# Patient Record
Sex: FEMALE | Race: WHITE | Employment: OTHER | URBAN - METROPOLITAN AREA
[De-identification: names, ages, dates, MRNs, and addresses within clinical notes are randomized per-mention and may not be internally consistent; named-entity substitution may affect disease eponyms.]

---

## 2019-09-17 LAB
ESTRADIOL LEVEL: 10 PG/ML
FOLLICLE STIMULATING HORMONE: 58.9 MIU/ML
TSH SERPL DL<=0.05 MIU/L-ACNC: 2.95 UIU/ML (ref 0.27–4.2)

## 2020-08-26 ENCOUNTER — HOSPITAL ENCOUNTER (OUTPATIENT)
Dept: ULTRASOUND IMAGING | Age: 55
Discharge: HOME | End: 2020-08-26
Payer: COMMERCIAL

## 2020-08-26 ENCOUNTER — HOSPITAL ENCOUNTER (OUTPATIENT)
Dept: MAMMOGRAPHY | Age: 55
Discharge: HOME | End: 2020-08-26
Payer: COMMERCIAL

## 2020-08-26 PROCEDURE — G0279 TOMOSYNTHESIS, MAMMO: HCPCS

## 2020-08-26 PROCEDURE — 76642 ULTRASOUND BREAST LIMITED: CPT

## 2021-02-25 ENCOUNTER — HOSPITAL ENCOUNTER (OUTPATIENT)
Dept: WOMENS IMAGING | Age: 56
Discharge: HOME | End: 2021-02-25
Payer: COMMERCIAL

## 2021-02-25 ENCOUNTER — APPOINTMENT (OUTPATIENT)
Dept: ULTRASOUND IMAGING | Age: 56
End: 2021-02-25
Payer: COMMERCIAL

## 2021-02-25 DIAGNOSIS — R92.8 FOLLOW-UP EXAMINATION OF ABNORMAL MAMMOGRAM: ICD-10-CM

## 2021-02-25 PROCEDURE — G0279 TOMOSYNTHESIS, MAMMO: HCPCS

## 2021-08-26 ENCOUNTER — HOSPITAL ENCOUNTER (OUTPATIENT)
Dept: WOMENS IMAGING | Age: 56
Discharge: HOME | End: 2021-08-26
Payer: COMMERCIAL

## 2021-08-26 VITALS — HEIGHT: 65 IN | WEIGHT: 250 LBS | BODY MASS INDEX: 41.65 KG/M2

## 2021-08-26 DIAGNOSIS — R92.8 ABNORMAL MAMMOGRAM: ICD-10-CM

## 2021-08-26 PROCEDURE — G0279 TOMOSYNTHESIS, MAMMO: HCPCS

## 2022-09-30 ENCOUNTER — HOSPITAL ENCOUNTER (OUTPATIENT)
Dept: WOMENS IMAGING | Age: 57
Discharge: HOME | End: 2022-09-30
Payer: COMMERCIAL

## 2022-09-30 VITALS — WEIGHT: 250 LBS | HEIGHT: 65 IN | BODY MASS INDEX: 41.65 KG/M2

## 2022-09-30 DIAGNOSIS — Z12.31 VISIT FOR SCREENING MAMMOGRAM: ICD-10-CM

## 2022-09-30 PROCEDURE — 77067 SCR MAMMO BI INCL CAD: CPT

## 2022-10-24 SDOH — HEALTH STABILITY: PHYSICAL HEALTH: ON AVERAGE, HOW MANY DAYS PER WEEK DO YOU ENGAGE IN MODERATE TO STRENUOUS EXERCISE (LIKE A BRISK WALK)?: 0 DAYS

## 2022-10-24 ASSESSMENT — SOCIAL DETERMINANTS OF HEALTH (SDOH)
WITHIN THE LAST YEAR, HAVE YOU BEEN KICKED, HIT, SLAPPED, OR OTHERWISE PHYSICALLY HURT BY YOUR PARTNER OR EX-PARTNER?: NO
WITHIN THE LAST YEAR, HAVE YOU BEEN AFRAID OF YOUR PARTNER OR EX-PARTNER?: NO
WITHIN THE LAST YEAR, HAVE TO BEEN RAPED OR FORCED TO HAVE ANY KIND OF SEXUAL ACTIVITY BY YOUR PARTNER OR EX-PARTNER?: NO
WITHIN THE LAST YEAR, HAVE YOU BEEN HUMILIATED OR EMOTIONALLY ABUSED IN OTHER WAYS BY YOUR PARTNER OR EX-PARTNER?: NO

## 2022-10-25 ENCOUNTER — TELEPHONE (OUTPATIENT)
Dept: ORTHOPEDIC SURGERY | Age: 57
End: 2022-10-25

## 2022-10-25 NOTE — TELEPHONE ENCOUNTER
SPOKE WITH PT. SHE WILL SE DR. SILVA FOR RT SHOULDER AND SEE DR. MC FOR HER BACK. PATIENT EXPRESSED UNDERSTANDING.

## 2022-10-27 ENCOUNTER — OFFICE VISIT (OUTPATIENT)
Dept: ORTHOPEDIC SURGERY | Age: 57
End: 2022-10-27
Payer: COMMERCIAL

## 2022-10-27 ENCOUNTER — TELEPHONE (OUTPATIENT)
Dept: ORTHOPEDIC SURGERY | Age: 57
End: 2022-10-27

## 2022-10-27 VITALS — BODY MASS INDEX: 41.65 KG/M2 | HEIGHT: 65 IN | WEIGHT: 250 LBS

## 2022-10-27 DIAGNOSIS — M25.511 RIGHT SHOULDER PAIN, UNSPECIFIED CHRONICITY: Primary | ICD-10-CM

## 2022-10-27 PROCEDURE — 3017F COLORECTAL CA SCREEN DOC REV: CPT | Performed by: ORTHOPAEDIC SURGERY

## 2022-10-27 PROCEDURE — 1036F TOBACCO NON-USER: CPT | Performed by: ORTHOPAEDIC SURGERY

## 2022-10-27 PROCEDURE — G8417 CALC BMI ABV UP PARAM F/U: HCPCS | Performed by: ORTHOPAEDIC SURGERY

## 2022-10-27 PROCEDURE — G8484 FLU IMMUNIZE NO ADMIN: HCPCS | Performed by: ORTHOPAEDIC SURGERY

## 2022-10-27 PROCEDURE — G8427 DOCREV CUR MEDS BY ELIG CLIN: HCPCS | Performed by: ORTHOPAEDIC SURGERY

## 2022-10-27 PROCEDURE — 99203 OFFICE O/P NEW LOW 30 MIN: CPT | Performed by: ORTHOPAEDIC SURGERY

## 2022-10-27 RX ORDER — SENNA PLUS 8.6 MG/1
TABLET ORAL PRN
COMMUNITY
Start: 2022-07-18

## 2022-10-27 RX ORDER — PRAVASTATIN SODIUM 40 MG
TABLET ORAL
COMMUNITY
Start: 2022-07-18

## 2022-10-27 RX ORDER — MELOXICAM 15 MG/1
TABLET ORAL
COMMUNITY
Start: 2020-10-15 | End: 2022-11-10

## 2022-10-27 RX ORDER — DIAZEPAM 5 MG/1
TABLET ORAL EVERY 12 HOURS PRN
COMMUNITY
Start: 2022-10-11

## 2022-10-27 RX ORDER — GABAPENTIN 300 MG/1
CAPSULE ORAL
COMMUNITY
Start: 2022-10-11

## 2022-10-27 NOTE — TELEPHONE ENCOUNTER
General Question     Subject: PATIENT STATES HER PHARMACY HAS NOT RECEIVED HER PRESCRIPTION. PLEASE ADVISE.    Patient Tammy Beckford  Contact Number: [phone]

## 2022-10-27 NOTE — TELEPHONE ENCOUNTER
Meds are pended in patient visit from this AM.  Will be sent once signed. Should be signed by end of day.

## 2022-10-27 NOTE — PROGRESS NOTES
10/27/2022     Reason for visit:  Right shoulder pain following injury    History of Present Illness:  Is a 53-year-old female who presents for evaluation. She reports right shoulder pain. She thinks she may have lifted a heavy object resulting in the pain. She is also fallen a few times over the past month or 2. She localized the pain diffuse about the shoulder including anterior, deep, and upper lateral arm. She has difficulty with overhead activity, reaching, and lifting. She is attempted home physical therapy and has been taken anti-inflammatories but remains symptomatic.     Medical History:  Past Medical History:   Diagnosis Date    Asthma     Depression     H/O drug overdose 2002    suicide attempt, cardiac arrest    Headache(784.0)     Hyperlipemia     Hypertension     Meniere's disease     Mitral valve prolapse     PONV (postoperative nausea and vomiting)     Sleep apnea     USES C-PAP    Wears hearing aid       Past Surgical History:   Procedure Laterality Date    BREAST BIOPSY      CERVIX REMOVAL  12/08/2016    CHOLECYSTECTOMY  2008    ENDOMETRIAL ABLATION  1999    FOOT SURGERY  2002    HYSTERECTOMY (CERVIX STATUS UNKNOWN)  2009    laparoscopic supracervical hyst for fibroids    LAP BAND  2008     removal 2011    TONSILLECTOMY  1971    TUBAL LIGATION  2003      Family History   Problem Relation Age of Onset    Ovarian Cancer Mother     Migraines Mother     Stroke Mother     Depression Mother     High Cholesterol Mother     Hypertension Father     Cancer Father     Migraines Other     Hypertension Other     Stroke Other     Alcohol Abuse Other     Hypertension Other     High Cholesterol Other     Stroke Other     Diabetes Other     Hypertension Other     High Cholesterol Other     Alcohol Abuse Other     Drug Abuse Other     Breast Cancer Other     Dementia Other     Diabetes Other     Hypertension Other     High Cholesterol Other     Stroke Other     Depression Other     Cancer Other       Social History     Socioeconomic History    Marital status:      Spouse name: Not on file    Number of children: Not on file    Years of education: Not on file    Highest education level: Not on file   Occupational History    Not on file   Tobacco Use    Smoking status: Former     Types: Cigarettes     Quit date: 2004     Years since quittin.8    Smokeless tobacco: Never   Substance and Sexual Activity    Alcohol use: No     Comment: RARE    Drug use: No    Sexual activity: Not on file   Other Topics Concern    Not on file   Social History Narrative    Not on file     Social Determinants of Health     Financial Resource Strain: Not on file   Food Insecurity: Not on file   Transportation Needs: Not on file   Physical Activity: Unknown    Days of Exercise per Week: 0 days    Minutes of Exercise per Session: Not on file   Stress: Not on file   Social Connections: Not on file   Intimate Partner Violence: Not At Risk    Fear of Current or Ex-Partner: No    Emotionally Abused: No    Physically Abused: No    Sexually Abused: No   Housing Stability: Not on file      Current Outpatient Medications on File Prior to Visit   Medication Sig Dispense Refill    pravastatin (PRAVACHOL) 40 MG tablet TAKE 1 TABLET AT BEDTIME      senna (SENOKOT) 8.6 MG tablet TAKE 2 TABLETS AT BEDTIME      meloxicam (MOBIC) 15 MG tablet 1 po qd prn      gabapentin (NEURONTIN) 300 MG capsule TAKE 1 CAPSULE BY MOUTH DAILY FOR 3 DAYS, THEN 1 CAPSULE 2 TIMES A DAY FOR 3 DAYS, THEN 1 CAPSULE 3 TIMES A DAY      diazePAM (VALIUM) 5 MG tablet       lisinopril (PRINIVIL;ZESTRIL) 20 MG tablet Take 20 mg by mouth daily      triamterene-hydrochlorothiazide (MAXZIDE-25) 37.5-25 MG per tablet Take 1 tablet by mouth daily      Omega-3 Fatty Acids (FISH OIL) 1200 MG CAPS Take by mouth 2 times daily      sertraline (ZOLOFT) 100 MG tablet Take 100 mg by mouth nightly      meclizine (ANTIVERT) 25 MG tablet Take 25 mg by mouth 2 times daily      promethazine (PHENERGAN) 25 MG tablet Take 25 mg by mouth every 6 hours as needed for Nausea      therapeutic multivitamin-minerals (THERAGRAN-M) tablet Take 1 tablet by mouth daily. omeprazole (PRILOSEC) 20 MG capsule Take 20 mg by mouth 2 times daily        No current facility-administered medications on file prior to visit. Allergies   Allergen Reactions    Iv Dye [Iodides]     Zomig [Zolmitriptan] Palpitations        Review of Systems:  Constitutional: Patient is adequately groomed with no evidence of malnutrition  Mental Status: The patient is oriented to time, place and person. The patient's mood and affect are appropriate. Lymphatic: The lymphatic examination bilaterally reveals all areas to be without enlargement or induration. Vascular: Examination reveals no swelling or calf tenderness. Peripheral pulses are palpable and 2+. Neurological: The patient has good coordination. There is no weakness or sensory deficit. Skin:  Head/Neck: inspection reveals no rashes, ulcerations or lesions. Trunk: inspection reveals no rashes, ulcerations or lesions.     Objective:  Ht 5' 5\" (1.651 m)   Wt 250 lb (113.4 kg)   BMI 41.60 kg/m²      Physical Exam:  The patient is well-appearing and in no apparent distress  Neg Spurling's test  Examination of the right shoulder  There is no swelling, ecchymosis, or gross deformity  There is no evidence of muscle atrophy  + Oates test, + Neers test  neg bicipital groove tenderness, neg AC joint tenderness  Range of motion reveals 120 degrees of forward flexion, 120 degrees of abduction, 40 degrees of external rotation, internal rotation to lumbar spine  4/5 strength with resisted abduction, 4/5 strength with resisted external rotation, 4+/5 strength with resisted internal rotation  Intact motor and sensory function throughout the median/radial/ulnar/PIN/AIN distributions  Palpable radial pulse, brisk cap refill, 2+ symmetric reflexes     Imagin view x-rays of the right shoulder obtained the office today on 10/27/2022 were reviewed. There is no fracture or dislocation. Degenerative changes of acromioclavicular joint. Assessment:  Right shoulder pain following injury. Concern for rotator cuff tendon tear    Plan:  Discussed with patient the differential diagnosis. Given history of injury combined with her current exam and lack of improvement with conservative measures I would recommend an MRI. She is in agreement. Following that study she will return to review the results in the office. We will also give a prescription for diclofenac. Greater than 30 minutes were spent with this encounter. Time spent included evaluating the patient's chart prior to arrival.  Evaluating the patient in the office including history, physical examination, imaging reviewing, and counseling on next steps. Lastly, time was spent discussing orders with my staff as well as providing documentation in the chart. Varun Everett MD            Orthopaedic Surgery Sports Medicine and [address] and [address] [facility]            Team Physician [facility] ([place]      Disclaimer: This note was dictated with voice recognition software. Though review and correction are routine, we apologize for any errors.

## 2022-10-29 SDOH — HEALTH STABILITY: PHYSICAL HEALTH: ON AVERAGE, HOW MANY DAYS PER WEEK DO YOU ENGAGE IN MODERATE TO STRENUOUS EXERCISE (LIKE A BRISK WALK)?: 0 DAYS

## 2022-11-01 ENCOUNTER — OFFICE VISIT (OUTPATIENT)
Dept: ORTHOPEDIC SURGERY | Age: 57
End: 2022-11-01
Payer: COMMERCIAL

## 2022-11-01 ENCOUNTER — HOSPITAL ENCOUNTER (OUTPATIENT)
Dept: MRI IMAGING | Age: 57
Discharge: HOME | End: 2022-11-01
Payer: COMMERCIAL

## 2022-11-01 ENCOUNTER — TELEPHONE (OUTPATIENT)
Dept: ORTHOPEDIC SURGERY | Age: 57
End: 2022-11-01

## 2022-11-01 VITALS — BODY MASS INDEX: 41.65 KG/M2 | WEIGHT: 250 LBS | HEIGHT: 65 IN

## 2022-11-01 DIAGNOSIS — M48.062 SPINAL STENOSIS OF LUMBAR REGION WITH NEUROGENIC CLAUDICATION: ICD-10-CM

## 2022-11-01 DIAGNOSIS — M25.511 RIGHT SHOULDER PAIN, UNSPECIFIED CHRONICITY: ICD-10-CM

## 2022-11-01 DIAGNOSIS — M48.061 DEGENERATIVE LUMBAR SPINAL STENOSIS: ICD-10-CM

## 2022-11-01 DIAGNOSIS — M51.36 DDD (DEGENERATIVE DISC DISEASE), LUMBAR: ICD-10-CM

## 2022-11-01 DIAGNOSIS — M47.816 LUMBAR SPONDYLOSIS: ICD-10-CM

## 2022-11-01 DIAGNOSIS — E66.01 MORBID OBESITY DUE TO EXCESS CALORIES (HCC): ICD-10-CM

## 2022-11-01 PROCEDURE — G8484 FLU IMMUNIZE NO ADMIN: HCPCS | Performed by: INTERNAL MEDICINE

## 2022-11-01 PROCEDURE — 3017F COLORECTAL CA SCREEN DOC REV: CPT | Performed by: INTERNAL MEDICINE

## 2022-11-01 PROCEDURE — 1036F TOBACCO NON-USER: CPT | Performed by: INTERNAL MEDICINE

## 2022-11-01 PROCEDURE — 73221 MRI JOINT UPR EXTREM W/O DYE: CPT

## 2022-11-01 PROCEDURE — 99204 OFFICE O/P NEW MOD 45 MIN: CPT | Performed by: INTERNAL MEDICINE

## 2022-11-01 PROCEDURE — G8427 DOCREV CUR MEDS BY ELIG CLIN: HCPCS | Performed by: INTERNAL MEDICINE

## 2022-11-01 PROCEDURE — G8417 CALC BMI ABV UP PARAM F/U: HCPCS | Performed by: INTERNAL MEDICINE

## 2022-11-01 RX ORDER — TIZANIDINE 4 MG/1
4 TABLET ORAL 3 TIMES DAILY PRN
Qty: 30 TABLET | Refills: 1 | Status: SHIPPED | OUTPATIENT
Start: 2022-11-01

## 2022-11-01 RX ORDER — TRAMADOL HYDROCHLORIDE 50 MG/1
50 TABLET ORAL EVERY 8 HOURS PRN
Qty: 20 TABLET | Refills: 0 | Status: SHIPPED | OUTPATIENT
Start: 2022-11-01 | End: 2022-11-08

## 2022-11-01 NOTE — PROGRESS NOTES
Chief Complaint:   Chief Complaint   Patient presents with    Lower Back Pain     Lumbar, pain has been going on for 30 years. Increased pain with activity or prolonged sitting. History of Present Illness:       Patient is a 61 y.o. female presents with the above complaint. The symptoms began 1 plus yearsago and started without an injury. The patient describes a sharp, aching pain that does not radiate. The symptoms are constant  and are are worsening since the onset. The symptoms of back pain  do show a neurogenic claudication pattern and is worsened by standing, walking, and increased activity levels  and improved with sitting and shifting position . There is not new onset weakness or progressive weakness of the lower extremities that has developed. The patient denies new onset bowel or bladder dysfunction. There is no history of previous spinal trauma. The patient does not have history or orthopaedic lumbar spine surgery. Pain localizes to the lumbar region    Pain levels: 8     There is no  lower limb pain. Work-up to date has included: Meloxicam and diclofenac and chiropractic care  Prior treatment has included. This patient reports little improvement with this treatment. The patient has no history or autoimmune disease, inflammatory arthropathy or crystal arthropathy.     Past Medical History:        Past Medical History:   Diagnosis Date    Asthma     Depression     H/O drug overdose 2002    suicide attempt, cardiac arrest    Headache(784.0)     Hyperlipemia     Hypertension     Meniere's disease     Mitral valve prolapse     PONV (postoperative nausea and vomiting)     Sleep apnea     USES C-PAP    Wears hearing aid          Past Surgical History:   Procedure Laterality Date    BREAST BIOPSY      CERVIX REMOVAL  12/08/2016    CHOLECYSTECTOMY  2008    ENDOMETRIAL ABLATION  1999    FOOT SURGERY  2002    HYSTERECTOMY (CERVIX STATUS UNKNOWN)  2009    laparoscopic supracervical hyst for fibroids    LAP BAND  2008     removal 2011    [facility]  2003         Present Medications:         Current Outpatient Medications   Medication Sig Dispense Refill    tiZANidine (ZANAFLEX) 4 MG tablet Take 1 tablet by mouth 3 times daily as needed (Muscle tightness/spasm) 30 tablet 1    traMADol (ULTRAM) 50 MG tablet Take 1 tablet by mouth every 8 hours as needed for Pain for up to 7 days. 20 tablet 0    pravastatin (PRAVACHOL) 40 MG tablet TAKE 1 TABLET AT BEDTIME      senna (SENOKOT) 8.6 MG tablet TAKE 2 TABLETS AT BEDTIME      gabapentin (NEURONTIN) 300 MG capsule TAKE 1 CAPSULE BY MOUTH DAILY FOR 3 DAYS, THEN 1 CAPSULE 2 TIMES A DAY FOR 3 DAYS, THEN 1 CAPSULE 3 TIMES A DAY      meloxicam (MOBIC) 15 MG tablet 1 po qd prn      diazePAM (VALIUM) 5 MG tablet       diclofenac (VOLTAREN) 50 MG EC tablet Take 1 tablet by mouth 2 times daily (with meals) 60 tablet 3    lisinopril (PRINIVIL;ZESTRIL) 20 MG tablet Take 20 mg by mouth daily      triamterene-hydrochlorothiazide (MAXZIDE-25) 37.5-25 MG per tablet Take 1 tablet by mouth daily      Omega-3 Fatty Acids (FISH OIL) 1200 MG CAPS Take by mouth 2 times daily      sertraline (ZOLOFT) 100 MG tablet Take 100 mg by mouth nightly      meclizine (ANTIVERT) 25 MG tablet Take 25 mg by mouth 2 times daily      promethazine (PHENERGAN) 25 MG tablet Take 25 mg by mouth every 6 hours as needed for Nausea      therapeutic multivitamin-minerals (THERAGRAN-M) tablet Take 1 tablet by mouth daily. omeprazole (PRILOSEC) 20 MG capsule Take 20 mg by mouth 2 times daily        No current facility-administered medications for this visit. Allergies:         Allergies   Allergen Reactions    Iv Dye [Iodides]     Zomig [Zolmitriptan] Palpitations        Social History:         Social History     Socioeconomic History    Marital status:      Spouse name: Not on file    Number of children: Not on file    Years of education: Not on file Highest education level: Not on file   Occupational History    Not on file   Tobacco Use    Smoking status: Former     Types: Cigarettes     Quit date: 2004     Years since quittin.8    Smokeless tobacco: Never   Substance and Sexual Activity    Alcohol use: No     Comment: RARE    Drug use: No    Sexual activity: Not on file   Other Topics Concern    Not on file   Social History Narrative    Not on file     Social Determinants of Health     Financial Resource Strain: Not on file   Food Insecurity: Not on file   Transportation Needs: Not on file   Physical Activity: Unknown    Days of Exercise per Week: 0 days    Minutes of Exercise per Session: Not on file   Stress: Not on file   Social Connections: Not on file   Intimate Partner Violence: Not At Risk    Fear of Current or Ex-Partner: No    Emotionally Abused: No    Physically Abused: No    Sexually Abused: No   Housing Stability: Not on file        Review of Symptoms:    Pertinent items are noted in HPI    Review of systems reviewed from Patient History Form dated on today's date and   available in the patient's chart under the Media tab. Vital Signs: There were no vitals filed for this visit. General Exam:     Constitutional: Patient is adequately groomed with no evidence of malnutrition  Mental Status: The patient is oriented to time, place and person. The patient's mood and affect are appropriate. Vascular: Examination reveals no swelling or calf tenderness. Peripheral pulses are palpable and 2+.     Lymphatics: no lymphadenopathy of the inguinal region or lower extremity      Physical Exam: lower back   General: Obese body habitus no acute distress   Primary Exam:    Inspection: No deformity atrophy appreciable curvature      Palpation: No focal trigger point tenderness      Range of Motion: 80/10 pain with extension greater than flexion      Strength: Normal lower extremity      Special Tests: Negative SLR      Skin: There are no rashes, ulcerations or lesions. Gait: Nonantalgic      Reflex hypoactive at the knees     Additional Comments:        Additional Examinations:           Neurologic -Light touch sensation and manual muscle testing is normal C5-C8 . Biceps and triceps reflexes are symmetric and +2. Spurlling sign is negative            Office Imaging Results/Procedures PerformedToday:      Radiology:      X-rays obtained and reviewed in office:   Views 3 views lumbar spine   Location lumbar spine   Impression: Subtle leftward rotoscoliosis lumbar spine lateral projection demonstrates advanced intervertebral disc space narrowing at L5/S1 multilevel spondylosis facet arthropathy most pronounced at L4-L5 greater than L5/S1 vertebral body heights are well-maintained       Office Procedures:     Orders Placed This Encounter   Procedures    XR LUMBAR SPINE (2-3 VIEWS)     Standing Status:   Future     Number of Occurrences:   1     Standing Expiration Date:   12/1/2022     Order Specific Question:   Reason for exam:     Answer:   Pain    MRI LUMBAR SPINE WO CONTRAST     Standing Status:   Future     Standing Expiration Date:   11/1/2023     Scheduling Instructions:      Caden Diaz, please contact pt to schedule, [address] will obtain auth and fwd to your facility. Pt advised to f/u in clinic 2-3 days after MRI for results. Order Specific Question:   Reason for exam:     Answer:   R/O HNP / STENOSIS     Order Specific Question:   What is the sedation requirement? Answer:   None    [facility], Nutrition Services, [place]     Referral Priority:   Routine     Referral Type:   Consult for Advice and Opinion     Referral Reason:   Specialty Services Required     Requested Specialty:   Nutrition     Number of Visits Requested:   1           Other Outside Imaging and Testing Personally Reviewed:    XR LUMBAR SPINE (2-3 VIEWS)    Result Date: 11/1/2022  Radiology exam is complete.  No Radiologist dictation. Please follow up with ordering provider. XR SHOULDER RIGHT (MIN 2 VIEWS)    Result Date: 10/27/2022  Radiology exam is complete. No Radiologist dictation. Please follow up with ordering provider. Assessment   Impression: . Encounter Diagnoses   Name Primary? Degenerative lumbar spinal stenosis     Spinal stenosis of lumbar region with neurogenic claudication     DDD (degenerative disc disease), lumbar     Lumbar spondylosis     Morbid obesity due to excess calories ([address] Yes              Plan:        MRI lumbar spine evaluate severity of discopathy/ stenosis suspected clinically  Consider Her a candidate for spine intervention injection  Activity modification, lumbar spine precautions  lumbar spinestabilization home exercise program  Medical pain management: NSAID: Continue diclofenac, addition of Zanaflex as needed, Ultram.  Short-term minimize use of narcotics  Referral to weight management and behavioral modification to promote weight loss measures    Approximately 45 minutes was spent related to previewing pertinent medical documentation prior to the patient's visit along with counseling during the patient's visit with respect to treatment options inclusive of alternatives to treatment and the complications and risks related to those treatment options along with expectations of outcome related to those treatments and inclusive of time in the documentation and ordering of testing and treatment after the visit. The nature of the finding, probable diagnosis and likely treatment was thoroughly discussed with the patient. The options, risks, complications, alternative treatment as well as some of the differential diagnosis was discussed. The patient was thoroughly informed and all questions were answered. the patient indicated understanding and satisfaction with the discussion.       Orders:        Orders Placed This Encounter   Procedures    XR LUMBAR SPINE (2-3 VIEWS)     Standing Status:   Future     Number of Occurrences:   1     Standing Expiration Date:   12/1/2022     Order Specific Question:   Reason for exam:     Answer:   Pain    MRI LUMBAR SPINE WO CONTRAST     Standing Status:   Future     Standing Expiration Date:   11/1/2023     Scheduling Instructions:      Rafiq Morales, please contact pt to schedule, [address] will obtain auth and fwd to your facility. Pt advised to f/u in clinic 2-3 days after MRI for results. Order Specific Question:   Reason for exam:     Answer:   R/O HNP / STENOSIS     Order Specific Question:   What is the sedation requirement? Answer:   None    [facility], Nutrition Services, [place]     Referral Priority:   Routine     Referral Type:   Consult for Advice and Opinion     Referral Reason:   Specialty Services Required     Requested Specialty:   Nutrition     Number of Visits Requested:   1           Disclaimer: \"This note was dictated with voice recognition software. Though review and correction are routine, we apologize for any errors. \"

## 2022-11-01 NOTE — TELEPHONE ENCOUNTER
General Question     Subject: patient call and would like to know  if she can get and Handicap Placard  she would like to have the letter or note put in her My Chart if possible. Please Advise.   Patient Breann Perales  Contact Number: [phone]

## 2022-11-03 ENCOUNTER — TELEPHONE (OUTPATIENT)
Dept: ORTHOPEDIC SURGERY | Age: 57
End: 2022-11-03

## 2022-11-03 ENCOUNTER — OFFICE VISIT (OUTPATIENT)
Dept: ORTHOPEDIC SURGERY | Age: 57
End: 2022-11-03
Payer: COMMERCIAL

## 2022-11-03 VITALS — BODY MASS INDEX: 41.65 KG/M2 | HEIGHT: 65 IN | WEIGHT: 250 LBS

## 2022-11-03 DIAGNOSIS — M75.121 COMPLETE TEAR OF RIGHT ROTATOR CUFF, UNSPECIFIED WHETHER TRAUMATIC: Primary | ICD-10-CM

## 2022-11-03 DIAGNOSIS — M75.41 IMPINGEMENT SYNDROME OF RIGHT SHOULDER: ICD-10-CM

## 2022-11-03 PROCEDURE — 1036F TOBACCO NON-USER: CPT | Performed by: ORTHOPAEDIC SURGERY

## 2022-11-03 PROCEDURE — G8417 CALC BMI ABV UP PARAM F/U: HCPCS | Performed by: ORTHOPAEDIC SURGERY

## 2022-11-03 PROCEDURE — G8427 DOCREV CUR MEDS BY ELIG CLIN: HCPCS | Performed by: ORTHOPAEDIC SURGERY

## 2022-11-03 PROCEDURE — G8484 FLU IMMUNIZE NO ADMIN: HCPCS | Performed by: ORTHOPAEDIC SURGERY

## 2022-11-03 PROCEDURE — 3017F COLORECTAL CA SCREEN DOC REV: CPT | Performed by: ORTHOPAEDIC SURGERY

## 2022-11-03 PROCEDURE — 99215 OFFICE O/P EST HI 40 MIN: CPT | Performed by: ORTHOPAEDIC SURGERY

## 2022-11-03 NOTE — LETTER
[facility]  Surgery Precert & Billing Form:    DEMOGRAPHICS:                                                                                                       Patient Name:  Nancy Reynoso  Patient :  1965   Patient SS#:      Patient Phone:  [phone] (home)  Alt.  Patient Phone:    Patient Address:  [facility] [address] [address] [address] [facility],[address] [id number]    PCP:  Kassi Gordon MD  Insurance: [employer]     DIAGNOSIS & PROCEDURE:                                                                                      Diagnosis: M75.101 Right shoulder rotator cuff tendon tear M75.41 Right shoulder impingement   Operation: right    SURGERY  INFORMATION  Date of Surgery:   22  Location:    [facility]  Type:    Outpatient  23 hour hold:  No  Surgeon:          Dr. Shannan Zaman 11/3/22     BILLING INFORMATION:                                                                                                Physician Procedure                                            CPT Codes        50161 [id number]   Right shoulder arthroscopy; possible subacromial decompression, rotator cuff tendon repair                 PA, or Fellow Procedure                                      CPT Codes                           Precert information:

## 2022-11-07 NOTE — PROGRESS NOTES
11/3/2022     Reason for visit:  Right shoulder pain following injury    History of Present Illness:  Is a 76-year-old female who presents for evaluation. She reports right shoulder pain. She thinks she may have lifted a heavy object resulting in the pain. She is also fallen a few times over the past month or 2. She localized the pain diffuse about the shoulder including anterior, deep, and upper lateral arm. She has difficulty with overhead activity, reaching, and lifting. She is attempted home physical therapy and has been taken anti-inflammatories but remains symptomatic. Last visit we elected proceed with an MRI. She is here to review the results and discuss treatment options. Objective:  Ht 5' 5\" (1.651 m)   Wt 250 lb (113.4 kg)   BMI 41.60 kg/m²      Physical Exam:  The patient is well-appearing and in no apparent distress  Neg Spurling's test  Examination of the right shoulder  There is no swelling, ecchymosis, or gross deformity  There is no evidence of muscle atrophy  + Oates test, + Neers test  neg bicipital groove tenderness, neg AC joint tenderness  Range of motion reveals 120 degrees of forward flexion, 120 degrees of abduction, 40 degrees of external rotation, internal rotation to lumbar spine  4/5 strength with resisted abduction, 4/5 strength with resisted external rotation, 4+/5 strength with resisted internal rotation  Intact motor and sensory function throughout the median/radial/ulnar/PIN/AIN distributions  Palpable radial pulse, brisk cap refill, 2+ symmetric reflexes     Imagin view x-rays of the right shoulder obtained the office today on 10/27/2022 were reviewed. There is no fracture or dislocation. Degenerative changes of acromioclavicular joint. The right shoulder was reviewed. There is no full-thickness tear of the supraspinatus present. Degenerative changes acromioclavicular joint. Cyst is noted within the subcutaneous tissue of the lateral upper arm.   This appears benign and likely a sebaceous cyst versus other benign process. Assessment:  Right shoulder pain following injury. MRI reveals rotator cuff tear. Patient also has a symptomatic benign-appearing cyst on the upper lateral arm    Plan:  Long discussion with the patient. We spent time reviewing MRI findings. Given the traumatic nature of her injury combined with the full-thickness involvement of the tear I would recommend surgical intervention in the form of right shoulder arthroscopy with rotator cuff tendon repair. The risk were defined as but not limited to infection, bleeding, damage of blood vessels or nerves, need for additional surgery. She does understand elects proceed. She also asked about the cyst on the side of her arm which does bother her. Therefore we would perform excision of the cyst at the time of surgery. Greater than 45 minutes were spent with this encounter. Time spent included evaluating the patient's chart prior to arrival.  Evaluating the patient in the office including history, physical examination, imaging reviewing, and counseling on next steps. Lastly, time was spent discussing orders with my staff as well as providing documentation in the chart. Shannan Zaman MD            Orthopaedic Surgery Sports Medicine and [address] and [address] [facility] Physician [facility] ([place])      Disclaimer: This note was dictated with voice recognition software. Though review and correction are routine, we apologize for any errors.

## 2022-11-10 ENCOUNTER — OFFICE VISIT (OUTPATIENT)
Dept: ORTHOPEDIC SURGERY | Age: 57
End: 2022-11-10
Payer: COMMERCIAL

## 2022-11-10 VITALS — HEIGHT: 65 IN | WEIGHT: 250 LBS | BODY MASS INDEX: 41.65 KG/M2

## 2022-11-10 DIAGNOSIS — M51.36 DDD (DEGENERATIVE DISC DISEASE), LUMBAR: ICD-10-CM

## 2022-11-10 DIAGNOSIS — M47.816 LUMBAR FACET ARTHROPATHY: ICD-10-CM

## 2022-11-10 DIAGNOSIS — M47.816 LUMBAR SPONDYLOSIS: ICD-10-CM

## 2022-11-10 PROCEDURE — 3017F COLORECTAL CA SCREEN DOC REV: CPT | Performed by: INTERNAL MEDICINE

## 2022-11-10 PROCEDURE — 99214 OFFICE O/P EST MOD 30 MIN: CPT | Performed by: INTERNAL MEDICINE

## 2022-11-10 PROCEDURE — G8417 CALC BMI ABV UP PARAM F/U: HCPCS | Performed by: INTERNAL MEDICINE

## 2022-11-10 PROCEDURE — 1036F TOBACCO NON-USER: CPT | Performed by: INTERNAL MEDICINE

## 2022-11-10 PROCEDURE — G8427 DOCREV CUR MEDS BY ELIG CLIN: HCPCS | Performed by: INTERNAL MEDICINE

## 2022-11-10 PROCEDURE — G8484 FLU IMMUNIZE NO ADMIN: HCPCS | Performed by: INTERNAL MEDICINE

## 2022-11-10 RX ORDER — TRAMADOL HYDROCHLORIDE 50 MG/1
50 TABLET ORAL EVERY 8 HOURS PRN
COMMUNITY

## 2022-11-10 RX ORDER — CELECOXIB 200 MG/1
200 CAPSULE ORAL DAILY PRN
Qty: 30 CAPSULE | Refills: 1 | Status: SHIPPED | OUTPATIENT
Start: 2022-11-10

## 2022-11-10 NOTE — PROGRESS NOTES
Chief Complaint:   Chief Complaint   Patient presents with    Lower Back Pain     F/U Lumbar MRI TR, feels the same no change. Any rotation or trunk flexion causes pain. Increased pain with standing and when sitting has to change position to find comfort. History of Present Illness:       Patient is a 61 y.o. female returns follow up for the above complaint. The patient was last seen approximately 9 daysago. The symptoms show no change since the last visit. The patient has had further testing for the problem. MRI completed in the interim. Back:Leftleg pain 80:20 . Pain back and leg is aching or stabbing in quality. The symptoms do not follow a discogenic provocative pattern. Pain levels: 4-8 . The patient denies new onset or progressive weakness of the lower extremities. The patient denies now onset bowel or bladder function.     She continues on medical pain management as per previous  inclusive of NSAID-diclofenac, muscle relaxant-Zanaflex  She reports no therapeutic benefit from diclofenac and has had a previous trial of meloxicam without benefit                Past Medical History:        Past Medical History:   Diagnosis Date    Asthma     Depression     H/O drug overdose 2002    suicide attempt, cardiac arrest    Headache(784.0)     Hyperlipemia     Hypertension     Meniere's disease     Mitral valve prolapse     PONV (postoperative nausea and vomiting)     Sleep apnea     USES C-PAP    Wears hearing aid         Present Medications:         Current Outpatient Medications   Medication Sig Dispense Refill    tiZANidine (ZANAFLEX) 4 MG tablet Take 1 tablet by mouth 3 times daily as needed (Muscle tightness/spasm) 30 tablet 1    pravastatin (PRAVACHOL) 40 MG tablet TAKE 1 TABLET AT BEDTIME      senna (SENOKOT) 8.6 MG tablet TAKE 2 TABLETS AT BEDTIME      gabapentin (NEURONTIN) 300 MG capsule TAKE 1 CAPSULE BY MOUTH DAILY FOR 3 DAYS, THEN 1 CAPSULE 2 TIMES A DAY FOR 3 DAYS, THEN 1 CAPSULE 3 TIMES A DAY      meloxicam (MOBIC) 15 MG tablet 1 po qd prn      diazePAM (VALIUM) 5 MG tablet       diclofenac (VOLTAREN) 50 MG EC tablet Take 1 tablet by mouth 2 times daily (with meals) 60 tablet 3    lisinopril (PRINIVIL;ZESTRIL) 20 MG tablet Take 20 mg by mouth daily      triamterene-hydrochlorothiazide (MAXZIDE-25) 37.5-25 MG per tablet Take 1 tablet by mouth daily      Omega-3 Fatty Acids (FISH OIL) 1200 MG CAPS Take by mouth 2 times daily      sertraline (ZOLOFT) 100 MG tablet Take 100 mg by mouth nightly      meclizine (ANTIVERT) 25 MG tablet Take 25 mg by mouth 2 times daily      promethazine (PHENERGAN) 25 MG tablet Take 25 mg by mouth every 6 hours as needed for Nausea      therapeutic multivitamin-minerals (THERAGRAN-M) tablet Take 1 tablet by mouth daily. omeprazole (PRILOSEC) 20 MG capsule Take 20 mg by mouth 2 times daily        No current facility-administered medications for this visit. Allergies: Allergies   Allergen Reactions    Iv Dye [Iodides]     Zomig [Zolmitriptan] Palpitations           Review of Systems:    Pertinent items are noted in HPI        Vital Signs: There were no vitals filed for this visit. General Exam:     Constitutional: Patient is adequately groomed with no evidence of malnutrition    Physical Exam:  Lower  back   General: Obese body habitus   Primary Exam:    Inspection: No deformity atrophy appreciable curvature      Palpation: No focal tenderness      Range of Motion: 70/5 pain with extension greater than flexion      Strength: Normal lower extremity      Special Tests: Negative SLR      Skin: There are no rashes, ulcerations or lesions.       Gait: Nonantalgic insert neuro    Neurovascular - non focal and intact       Additional Comments:        Additional Examinations:                    Office Imaging Results/Procedures PerformedToday:        Office Procedures:     Orders Placed This Encounter   Procedures    External Referral To Physical Therapy     Referral Priority:   Routine     Referral Type:   Eval and Treat     Referral Reason:   Specialty Services Required     Requested Specialty:   Physical Therapist     Number of Visits Requested:   1           Other Outside Imaging and Testing Personally Reviewed:    MRI LUMBAR SPINE WO CONTRAST    Result Date: 2022  Site: [facility] #: [id number] #: [id number] Procedure: MR Lumbar Spine w/o Contrast ; Reason for Exam: Spinal stenosis, lumbar region without neurogenic claudication, Low back pain, unspecified This document is confidential medical information. Unauthorized disclosure or use of this information is prohibited by law. If you are not the intended recipient of this document, please advise us by calling immediately [phone]. [facility] [address] Patient Name: Lilli Noriega Case ID: [id number] Patient : 1965 Referring Physician: Kasandra Nam MD Exam Date: 2022 Exam Description: MR Lumbar Spine w/o Contrast HISTORY:  Spinal stenosis without neurogenic claudication. Chronic low back pain. TECHNICAL FACTORS:  Long- and short-axis fat- and water-weighted images were performed. COMPARISON:  None. FINDINGS:  Subcentimeter round, smooth cyst in the left kidney. L1-2: Normal. L2-3: Right facet capsulitis. L3-4: Normal. L4-5: Distended facet capsule sign on the right raises the possibility of chronic microinstability; therefore, standing lateral flexion and extension views to evaluate for occult dynamic microinstability is requested. Scattered areas of fatty metaplasia about the endplate of I7-J5 in the L5 vertebral body consistent with Modic 2 changes. Generalized spondylosis deformans. Facet arthropathy.  CONCLUSION: Degenerative disc disease at L5-S1; however, the dominant finding most likely is at L4-5, where  distended facet capsule sign raises the possibility of microinstability syndrome with recommendations given in the body of the report. Thank you for the opportunity to provide your interpretation. William Clark MD A: [place] 11/09/2022 8:09 AM T: JOSE 11/08/2022 12:58 PM              Assessment   Impression: . Encounter Diagnoses   Name Primary? Lumbar facet arthropathy     Lumbar spondylosis     DDD (degenerative disc disease), lumbar               Plan: Activity modification lumbar spine precautions  Continue maintenance lumbar stabilization home exercise program  Discontinue diclofenac for Celebrex daily as needed GI precautions and as needed use of Zanaflex  Consider her candidate for L MBB if symptoms show no appreciable change or worsen  PT will have to be modified with delayed as she is scheduled undergo rotator cuff surgery next week; hold oral steroid trial relative to upcoming elective surgery  Behavior modification to promote weight loss as much as possible and consider referral to weight management program    Approximately 30 minutes was spent related to previewing pertinent medical documentation prior to the patient's visit along with counseling during the patient's visit with respect to treatment options inclusive of alternatives to treatment and the complications and risks related to those treatment options along with expectations of outcome related to those treatments and inclusive of time in the documentation and ordering of testing and treatment after the visit. Orders:        Orders Placed This Encounter   Procedures    External Referral To Physical Therapy     Referral Priority:   Routine     Referral Type:   Eval and Treat     Referral Reason:   Specialty Services Required     Requested Specialty:   Physical Therapist     Number of Visits Requested:   1         Corrinne Slicker, MD.      Brant Murry: \"This note was dictated with voice recognition software. Though review and correction are routine, we apologize for any errors. \"

## 2022-11-16 ENCOUNTER — TELEPHONE (OUTPATIENT)
Dept: ORTHOPEDIC SURGERY | Age: 57
End: 2022-11-16

## 2022-11-16 DIAGNOSIS — G89.18 POST-OP PAIN: Primary | ICD-10-CM

## 2022-11-18 ENCOUNTER — ANESTHESIA (OUTPATIENT)
Dept: OPERATING ROOM | Age: 57
End: 2022-11-18
Payer: COMMERCIAL

## 2022-11-18 ENCOUNTER — HOSPITAL ENCOUNTER (OUTPATIENT)
Age: 57
Setting detail: OUTPATIENT SURGERY
Discharge: HOME | End: 2022-11-18
Attending: ORTHOPAEDIC SURGERY | Admitting: ORTHOPAEDIC SURGERY
Payer: COMMERCIAL

## 2022-11-18 ENCOUNTER — ANESTHESIA EVENT (OUTPATIENT)
Dept: OPERATING ROOM | Age: 57
End: 2022-11-18
Payer: COMMERCIAL

## 2022-11-18 VITALS
HEIGHT: 65 IN | TEMPERATURE: 97.5 F | WEIGHT: 259 LBS | DIASTOLIC BLOOD PRESSURE: 65 MMHG | OXYGEN SATURATION: 95 % | HEART RATE: 91 BPM | BODY MASS INDEX: 43.15 KG/M2 | SYSTOLIC BLOOD PRESSURE: 143 MMHG | RESPIRATION RATE: 22 BRPM

## 2022-11-18 DIAGNOSIS — M75.41 SHOULDER IMPINGEMENT, RIGHT: ICD-10-CM

## 2022-11-18 DIAGNOSIS — M75.101 TEAR OF RIGHT ROTATOR CUFF, UNSPECIFIED TEAR EXTENT, UNSPECIFIED WHETHER TRAUMATIC: ICD-10-CM

## 2022-11-18 PROCEDURE — 6360000002 HC RX W HCPCS: Performed by: ORTHOPAEDIC SURGERY

## 2022-11-18 PROCEDURE — 3600000014 HC SURGERY LEVEL 4 ADDTL 15MIN: Performed by: ORTHOPAEDIC SURGERY

## 2022-11-18 PROCEDURE — C1713 ANCHOR/SCREW BN/BN,TIS/BN: HCPCS | Performed by: ORTHOPAEDIC SURGERY

## 2022-11-18 PROCEDURE — 2580000003 HC RX 258: Performed by: ORTHOPAEDIC SURGERY

## 2022-11-18 PROCEDURE — 2720000010 HC SURG SUPPLY STERILE: Performed by: ORTHOPAEDIC SURGERY

## 2022-11-18 PROCEDURE — 7100000000 HC PACU RECOVERY - FIRST 15 MIN: Performed by: ORTHOPAEDIC SURGERY

## 2022-11-18 PROCEDURE — 88304 TISSUE EXAM BY PATHOLOGIST: CPT

## 2022-11-18 PROCEDURE — 3700000001 HC ADD 15 MINUTES (ANESTHESIA): Performed by: ORTHOPAEDIC SURGERY

## 2022-11-18 PROCEDURE — 7100000011 HC PHASE II RECOVERY - ADDTL 15 MIN: Performed by: ORTHOPAEDIC SURGERY

## 2022-11-18 PROCEDURE — L3660 SO 8 AB RSTR CAN/WEB PRE OTS: HCPCS | Performed by: ORTHOPAEDIC SURGERY

## 2022-11-18 PROCEDURE — 2500000003 HC RX 250 WO HCPCS: Performed by: ANESTHESIOLOGY

## 2022-11-18 PROCEDURE — 2580000003 HC RX 258: Performed by: NURSE ANESTHETIST, CERTIFIED REGISTERED

## 2022-11-18 PROCEDURE — 3700000000 HC ANESTHESIA ATTENDED CARE: Performed by: ORTHOPAEDIC SURGERY

## 2022-11-18 PROCEDURE — 3600000004 HC SURGERY LEVEL 4 BASE: Performed by: ORTHOPAEDIC SURGERY

## 2022-11-18 PROCEDURE — 6360000002 HC RX W HCPCS: Performed by: NURSE ANESTHETIST, CERTIFIED REGISTERED

## 2022-11-18 PROCEDURE — 2709999900 HC NON-CHARGEABLE SUPPLY: Performed by: ORTHOPAEDIC SURGERY

## 2022-11-18 PROCEDURE — 2500000003 HC RX 250 WO HCPCS: Performed by: ORTHOPAEDIC SURGERY

## 2022-11-18 PROCEDURE — 7100000001 HC PACU RECOVERY - ADDTL 15 MIN: Performed by: ORTHOPAEDIC SURGERY

## 2022-11-18 PROCEDURE — 6360000002 HC RX W HCPCS: Performed by: ANESTHESIOLOGY

## 2022-11-18 PROCEDURE — 2500000003 HC RX 250 WO HCPCS: Performed by: NURSE ANESTHETIST, CERTIFIED REGISTERED

## 2022-11-18 PROCEDURE — 6370000000 HC RX 637 (ALT 250 FOR IP): Performed by: ANESTHESIOLOGY

## 2022-11-18 PROCEDURE — 7100000010 HC PHASE II RECOVERY - FIRST 15 MIN: Performed by: ORTHOPAEDIC SURGERY

## 2022-11-18 PROCEDURE — 76942 ECHO GUIDE FOR BIOPSY: CPT | Performed by: ANESTHESIOLOGY

## 2022-11-18 DEVICE — HEALICOIL RG SA 5.5MM W/3 UB-BL CB                                    BL BK
Type: IMPLANTABLE DEVICE | Site: SHOULDER | Status: FUNCTIONAL
Brand: HEALICOIL / ULTRABRAID

## 2022-11-18 RX ORDER — SUCCINYLCHOLINE/SOD CL,ISO/PF 200MG/10ML
SYRINGE (ML) INTRAVENOUS PRN
Status: DISCONTINUED | OUTPATIENT
Start: 2022-11-18 | End: 2022-11-18 | Stop reason: SDUPTHER

## 2022-11-18 RX ORDER — ROCURONIUM BROMIDE 10 MG/ML
INJECTION, SOLUTION INTRAVENOUS PRN
Status: DISCONTINUED | OUTPATIENT
Start: 2022-11-18 | End: 2022-11-18 | Stop reason: SDUPTHER

## 2022-11-18 RX ORDER — HYDROCODONE BITARTRATE AND ACETAMINOPHEN 10; 325 MG/1; MG/1
1 TABLET ORAL EVERY 4 HOURS PRN
Qty: 30 TABLET | Refills: 0 | Status: SHIPPED | OUTPATIENT
Start: 2022-11-18 | End: 2022-11-25

## 2022-11-18 RX ORDER — ASPIRIN 325 MG
325 TABLET, DELAYED RELEASE (ENTERIC COATED) ORAL DAILY
Qty: 14 TABLET | Refills: 0 | Status: SHIPPED | OUTPATIENT
Start: 2022-11-18 | End: 2022-12-02

## 2022-11-18 RX ORDER — ONDANSETRON 2 MG/ML
4 INJECTION INTRAMUSCULAR; INTRAVENOUS
Status: DISCONTINUED | OUTPATIENT
Start: 2022-11-18 | End: 2022-11-18 | Stop reason: HOSPADM

## 2022-11-18 RX ORDER — ONDANSETRON 2 MG/ML
INJECTION INTRAMUSCULAR; INTRAVENOUS PRN
Status: DISCONTINUED | OUTPATIENT
Start: 2022-11-18 | End: 2022-11-18 | Stop reason: SDUPTHER

## 2022-11-18 RX ORDER — OXYCODONE HYDROCHLORIDE 5 MG/1
5 TABLET ORAL
Status: COMPLETED | OUTPATIENT
Start: 2022-11-18 | End: 2022-11-18

## 2022-11-18 RX ORDER — PROPOFOL 10 MG/ML
INJECTION, EMULSION INTRAVENOUS PRN
Status: DISCONTINUED | OUTPATIENT
Start: 2022-11-18 | End: 2022-11-18 | Stop reason: SDUPTHER

## 2022-11-18 RX ORDER — HYDROMORPHONE HCL 110MG/55ML
0.5 PATIENT CONTROLLED ANALGESIA SYRINGE INTRAVENOUS EVERY 5 MIN PRN
Status: DISCONTINUED | OUTPATIENT
Start: 2022-11-18 | End: 2022-11-18 | Stop reason: HOSPADM

## 2022-11-18 RX ORDER — FENTANYL CITRATE 50 UG/ML
100 INJECTION, SOLUTION INTRAMUSCULAR; INTRAVENOUS ONCE
Status: COMPLETED | OUTPATIENT
Start: 2022-11-18 | End: 2022-11-18

## 2022-11-18 RX ORDER — SODIUM CHLORIDE, SODIUM LACTATE, POTASSIUM CHLORIDE, CALCIUM CHLORIDE 600; 310; 30; 20 MG/100ML; MG/100ML; MG/100ML; MG/100ML
INJECTION, SOLUTION INTRAVENOUS CONTINUOUS
Status: DISCONTINUED | OUTPATIENT
Start: 2022-11-18 | End: 2022-11-18 | Stop reason: HOSPADM

## 2022-11-18 RX ORDER — ONDANSETRON 4 MG/1
4 TABLET, FILM COATED ORAL EVERY 8 HOURS PRN
Qty: 21 TABLET | Refills: 0 | Status: SHIPPED | OUTPATIENT
Start: 2022-11-18 | End: 2022-11-25

## 2022-11-18 RX ORDER — LABETALOL HYDROCHLORIDE 5 MG/ML
10 INJECTION, SOLUTION INTRAVENOUS
Status: DISCONTINUED | OUTPATIENT
Start: 2022-11-18 | End: 2022-11-18 | Stop reason: HOSPADM

## 2022-11-18 RX ORDER — MIDAZOLAM HYDROCHLORIDE 2 MG/2ML
2 INJECTION, SOLUTION INTRAMUSCULAR; INTRAVENOUS ONCE
Status: COMPLETED | OUTPATIENT
Start: 2022-11-18 | End: 2022-11-18

## 2022-11-18 RX ORDER — APREPITANT 40 MG/1
40 CAPSULE ORAL ONCE
Status: COMPLETED | OUTPATIENT
Start: 2022-11-18 | End: 2022-11-18

## 2022-11-18 RX ORDER — BUPIVACAINE HYDROCHLORIDE 5 MG/ML
INJECTION, SOLUTION EPIDURAL; INTRACAUDAL
Status: COMPLETED | OUTPATIENT
Start: 2022-11-18 | End: 2022-11-18

## 2022-11-18 RX ORDER — LIDOCAINE HYDROCHLORIDE 10 MG/ML
1 INJECTION, SOLUTION EPIDURAL; INFILTRATION; INTRACAUDAL; PERINEURAL
Status: DISCONTINUED | OUTPATIENT
Start: 2022-11-18 | End: 2022-11-18 | Stop reason: HOSPADM

## 2022-11-18 RX ORDER — PHENYLEPHRINE HCL IN 0.9% NACL 1 MG/10 ML
SYRINGE (ML) INTRAVENOUS PRN
Status: DISCONTINUED | OUTPATIENT
Start: 2022-11-18 | End: 2022-11-18 | Stop reason: SDUPTHER

## 2022-11-18 RX ORDER — DEXAMETHASONE SODIUM PHOSPHATE 4 MG/ML
INJECTION, SOLUTION INTRA-ARTICULAR; INTRALESIONAL; INTRAMUSCULAR; INTRAVENOUS; SOFT TISSUE PRN
Status: DISCONTINUED | OUTPATIENT
Start: 2022-11-18 | End: 2022-11-18 | Stop reason: SDUPTHER

## 2022-11-18 RX ORDER — GLYCOPYRROLATE 0.2 MG/ML
INJECTION INTRAMUSCULAR; INTRAVENOUS PRN
Status: DISCONTINUED | OUTPATIENT
Start: 2022-11-18 | End: 2022-11-18 | Stop reason: SDUPTHER

## 2022-11-18 RX ORDER — FENTANYL CITRATE 50 UG/ML
25 INJECTION, SOLUTION INTRAMUSCULAR; INTRAVENOUS EVERY 5 MIN PRN
Status: DISCONTINUED | OUTPATIENT
Start: 2022-11-18 | End: 2022-11-18 | Stop reason: HOSPADM

## 2022-11-18 RX ORDER — PROCHLORPERAZINE EDISYLATE 5 MG/ML
5 INJECTION INTRAMUSCULAR; INTRAVENOUS
Status: DISCONTINUED | OUTPATIENT
Start: 2022-11-18 | End: 2022-11-18 | Stop reason: HOSPADM

## 2022-11-18 RX ORDER — LIDOCAINE HYDROCHLORIDE 10 MG/ML
0.5 INJECTION, SOLUTION EPIDURAL; INFILTRATION; INTRACAUDAL; PERINEURAL ONCE
Status: DISCONTINUED | OUTPATIENT
Start: 2022-11-18 | End: 2022-11-18 | Stop reason: HOSPADM

## 2022-11-18 RX ORDER — LIDOCAINE HYDROCHLORIDE 20 MG/ML
INJECTION, SOLUTION EPIDURAL; INFILTRATION; INTRACAUDAL; PERINEURAL PRN
Status: DISCONTINUED | OUTPATIENT
Start: 2022-11-18 | End: 2022-11-18 | Stop reason: SDUPTHER

## 2022-11-18 RX ORDER — SODIUM CHLORIDE 9 MG/ML
INJECTION, SOLUTION INTRAVENOUS CONTINUOUS
Status: DISCONTINUED | OUTPATIENT
Start: 2022-11-18 | End: 2022-11-18 | Stop reason: HOSPADM

## 2022-11-18 RX ORDER — HYDRALAZINE HYDROCHLORIDE 20 MG/ML
10 INJECTION INTRAMUSCULAR; INTRAVENOUS
Status: DISCONTINUED | OUTPATIENT
Start: 2022-11-18 | End: 2022-11-18 | Stop reason: HOSPADM

## 2022-11-18 RX ORDER — MIDAZOLAM HYDROCHLORIDE 1 MG/ML
INJECTION INTRAMUSCULAR; INTRAVENOUS PRN
Status: DISCONTINUED | OUTPATIENT
Start: 2022-11-18 | End: 2022-11-18 | Stop reason: SDUPTHER

## 2022-11-18 RX ADMIN — MIDAZOLAM 2 MG: 1 INJECTION INTRAMUSCULAR; INTRAVENOUS at 08:46

## 2022-11-18 RX ADMIN — LIDOCAINE HYDROCHLORIDE 100 MG: 20 INJECTION, SOLUTION EPIDURAL; INFILTRATION; INTRACAUDAL; PERINEURAL at 08:55

## 2022-11-18 RX ADMIN — Medication 160 MG: at 08:55

## 2022-11-18 RX ADMIN — OXYCODONE 5 MG: 5 TABLET ORAL at 11:41

## 2022-11-18 RX ADMIN — ONDANSETRON 4 MG: 2 INJECTION INTRAMUSCULAR; INTRAVENOUS at 09:14

## 2022-11-18 RX ADMIN — PROPOFOL 200 MG: 10 INJECTION, EMULSION INTRAVENOUS at 08:55

## 2022-11-18 RX ADMIN — ROCURONIUM BROMIDE 40 MG: 10 INJECTION, SOLUTION INTRAVENOUS at 09:10

## 2022-11-18 RX ADMIN — GLYCOPYRROLATE 0.2 MG: 0.2 INJECTION INTRAMUSCULAR; INTRAVENOUS at 09:14

## 2022-11-18 RX ADMIN — SODIUM CHLORIDE, POTASSIUM CHLORIDE, SODIUM LACTATE AND CALCIUM CHLORIDE: 600; 310; 30; 20 INJECTION, SOLUTION INTRAVENOUS at 07:50

## 2022-11-18 RX ADMIN — APREPITANT 40 MG: 40 CAPSULE ORAL at 07:55

## 2022-11-18 RX ADMIN — Medication 200 MCG: at 09:18

## 2022-11-18 RX ADMIN — SUGAMMADEX 200 MG: 100 INJECTION, SOLUTION INTRAVENOUS at 10:30

## 2022-11-18 RX ADMIN — PHENYLEPHRINE HYDROCHLORIDE 30 MCG/MIN: 10 INJECTION INTRAVENOUS at 09:33

## 2022-11-18 RX ADMIN — CEFAZOLIN 2000 MG: 2 INJECTION, POWDER, FOR SOLUTION INTRAMUSCULAR; INTRAVENOUS at 08:46

## 2022-11-18 RX ADMIN — SODIUM CHLORIDE, POTASSIUM CHLORIDE, SODIUM LACTATE AND CALCIUM CHLORIDE: 600; 310; 30; 20 INJECTION, SOLUTION INTRAVENOUS at 10:41

## 2022-11-18 RX ADMIN — BUPIVACAINE HYDROCHLORIDE 25 ML: 5 INJECTION, SOLUTION EPIDURAL; INTRACAUDAL at 08:20

## 2022-11-18 RX ADMIN — FENTANYL CITRATE 50 MCG: 50 INJECTION, SOLUTION INTRAMUSCULAR; INTRAVENOUS at 08:14

## 2022-11-18 RX ADMIN — Medication 200 MCG: at 09:13

## 2022-11-18 RX ADMIN — MIDAZOLAM 1 MG: 1 INJECTION INTRAMUSCULAR; INTRAVENOUS at 08:14

## 2022-11-18 RX ADMIN — Medication 200 MCG: at 09:26

## 2022-11-18 RX ADMIN — DEXAMETHASONE SODIUM PHOSPHATE 4 MG: 4 INJECTION, SOLUTION INTRAMUSCULAR; INTRAVENOUS at 09:14

## 2022-11-18 RX ADMIN — ROCURONIUM BROMIDE 10 MG: 10 INJECTION, SOLUTION INTRAVENOUS at 08:55

## 2022-11-18 RX ADMIN — TRANEXAMIC ACID 1000 MG: 1 INJECTION, SOLUTION INTRAVENOUS at 10:33

## 2022-11-18 RX ADMIN — TRANEXAMIC ACID 1000 MG: 1 INJECTION, SOLUTION INTRAVENOUS at 09:00

## 2022-11-18 RX ADMIN — Medication 200 MCG: at 09:30

## 2022-11-18 ASSESSMENT — PAIN SCALES - GENERAL
PAINLEVEL_OUTOF10: 4
PAINLEVEL_OUTOF10: 0
PAINLEVEL_OUTOF10: 0
PAINLEVEL_OUTOF10: 4

## 2022-11-18 ASSESSMENT — PAIN DESCRIPTION - DESCRIPTORS
DESCRIPTORS: ACHING
DESCRIPTORS: ACHING

## 2022-11-18 ASSESSMENT — PAIN - FUNCTIONAL ASSESSMENT: PAIN_FUNCTIONAL_ASSESSMENT: 0-10

## 2022-11-18 ASSESSMENT — PAIN DESCRIPTION - ORIENTATION
ORIENTATION: RIGHT
ORIENTATION: RIGHT

## 2022-11-18 ASSESSMENT — ENCOUNTER SYMPTOMS: SHORTNESS OF BREATH: 0

## 2022-11-18 ASSESSMENT — PAIN DESCRIPTION - LOCATION
LOCATION: SHOULDER
LOCATION: SHOULDER

## 2022-11-18 NOTE — ANESTHESIA POSTPROCEDURE EVALUATION
Department of Anesthesiology  Postprocedure Note    Patient: David Lamb  MRN: [id number]  YOB: 1965  Date of evaluation: 11/18/2022      Procedure Summary     Date: 11/18/22 Room / Location: [address]    Anesthesia Start: 0848 Anesthesia Stop: 1110    Procedure: RIGHT SHOULDER ARTHROSCOPY; SUBACROMIAL DECOMPRESSION, ROTATOR CUFF TENDON REPAIR - BLOCK, EXCISION OF RIGHT UPPER ARM/SHOULDER (Right: Shoulder) Diagnosis:       Tear of right rotator cuff, unspecified tear extent, unspecified whether traumatic      Shoulder impingement, right      (Tear of right rotator cuff, unspecified tear extent, unspecified whether traumatic [M75.101])      (Shoulder impingement, right [M75.41])    Surgeons: Shirlene Bejarano MD Responsible Provider: Syd Gayle MD    Anesthesia Type: general, regional ASA Status: 3          Anesthesia Type: No value filed.     Angel Phase I: Angel Score: 10    Angel Phase II: Angel Score: 10      Anesthesia Post Evaluation    Patient location during evaluation: PACU  Patient participation: complete - patient participated  Level of consciousness: awake and alert  Airway patency: patent  Nausea & Vomiting: no nausea and no vomiting  Complications: no  Cardiovascular status: hemodynamically stable  Respiratory status: acceptable  Hydration status: stable  Multimodal analgesia pain management approach

## 2022-11-18 NOTE — ANESTHESIA PROCEDURE NOTES
Peripheral Block    Patient location during procedure: pre-op  Reason for block: post-op pain management and at surgeon's request  Start time: 11/18/2022 8:20 AM  End time: 11/18/2022 8:25 AM  Staffing  Performed: anesthesiologist   Anesthesiologist: Colletta Flesher, MD  Preanesthetic Checklist  Completed: patient identified, IV checked, site marked, risks and benefits discussed, surgical/procedural consents, equipment checked, pre-op evaluation, timeout performed, anesthesia consent given, oxygen available and monitors applied/VS acknowledged  Peripheral Block   Patient position: sitting  Prep: ChloraPrep  Provider prep: mask and sterile gloves  Patient monitoring: cardiac monitor, continuous pulse ox, frequent blood pressure checks and IV access  Block type: Brachial plexus  Interscalene  Laterality: right  Injection technique: single-shot  Guidance: nerve stimulator and ultrasound guided  Local infiltration: lidocaine  Infiltration strength: 1 %  Local infiltration: lidocaine  Dose: 3 mL    Needle   Needle type: short-bevel   Needle gauge: 22 G  Needle localization: ultrasound guidance and nerve stimulator  Needle length: 10 cm  Assessment   Injection assessment: negative aspiration for heme, no paresthesia on injection and local visualized surrounding nerve on ultrasound  Paresthesia pain: none  Slow fractionated injection: yes  Hemodynamics: stable  Real-time US image taken/store: yes  Outcomes: uncomplicated and patient tolerated procedure well    Additional Notes  U/S 80908. (1) Under ultrasound guidance, a 22 gauge needle was inserted and placed in close proximity to the BP nerve. (2) Ultrasound was also used to visualize the spread of the anesthetic in close proximity to the nerve being blocked. (3) The nerve appeared anatomically normal, and (4 there were no apparent abnormal pathological findings on the image that were readily visible and related to the nerve being blocked.  (5) A permanent ultrasound image was saved in the patient's record.             Medications Administered  bupivacaine (PF) 0.5 % - Perineural   25 mL - 11/18/2022 8:20:00 AM

## 2022-11-18 NOTE — ANESTHESIA PRE PROCEDURE
Department of Anesthesiology  Preprocedure Note       Name:  Yessenia Berkowitz   Age:  61 y.o.  :  1965                                          MRN:  [id number]         Date:  2022      Surgeon: Pablo Xavier):  Ilsa Rees MD    Procedure: Procedure(s):  RIGHT SHOULDER ARTHROSCOPY; POSSIBLE SUBACROMIAL DECOMPRESSION, ROTATOR CUFF TENDON REPAIR - BLOCK    Medications prior to admission:   Prior to Admission medications    Medication Sig Start Date End Date Taking? Authorizing Provider   diclofenac (VOLTAREN) 50 MG EC tablet Take 50 mg by mouth 2 times daily   Yes Historical Provider, MD   traMADol (ULTRAM) 50 MG tablet Take 50 mg by mouth every 8 hours as needed for Pain. Yes Historical Provider, MD   HYDROcodone-acetaminophen (NORCO)  MG per tablet Take 1 tablet by mouth every 4 hours as needed for Pain for up to 30 doses.  Intended supply: 7 days 22  Ilsa Rees MD   ondansetron [facility]) 4 MG tablet Take 1 tablet by mouth every 8 hours as needed for Nausea or Vomiting 22  Ilsa Rees MD   aspirin 325 MG EC tablet Take 1 tablet by mouth daily for 14 days 22  Ilsa Rees MD   celecoxib (CELEBREX) 200 MG capsule Take 1 capsule by mouth daily as needed for Pain 11/10/22   Ronald Johnson MD   tiZANidine (ZANAFLEX) 4 MG tablet Take 1 tablet by mouth 3 times daily as needed (Muscle tightness/spasm)  Patient taking differently: Take 4 mg by mouth 3 times daily as needed (Muscle tightness/spasm) 1 daily 22   Ronald Johnson MD   pravastatin (PRAVACHOL) 40 MG tablet TAKE 1 TABLET AT BEDTIME 22   Historical Provider, MD   senna (SENOKOT) 8.6 MG tablet as needed 22   Historical Provider, MD   gabapentin (NEURONTIN) 300 MG capsule TAKE 1 CAPSULE BY MOUTH DAILY FOR 3 DAYS, THEN 1 CAPSULE 2 TIMES A DAY FOR 3 DAYS, THEN 1 CAPSULE 3 TIMES A DAY 10/11/22   Historical Provider, MD   diazePAM (VALIUM) 5 MG tablet every 12 hours as needed. 10/11/22   Historical Provider, MD   lisinopril (PRINIVIL;ZESTRIL) 20 MG tablet Take 10 mg by mouth daily    Historical Provider, MD   triamterene-hydrochlorothiazide (MAXZIDE-25) 37.5-25 MG per tablet Take 1 tablet by mouth daily    Historical Provider, MD   Omega-3 Fatty Acids (FISH OIL) 1000 MG CPDR Take by mouth 2 times daily    Historical Provider, MD   sertraline (ZOLOFT) 100 MG tablet Take 100 mg by mouth every morning    Historical Provider, MD   meclizine (ANTIVERT) 25 MG tablet Take 25 mg by mouth 2 times daily as needed    Historical Provider, MD   promethazine (PHENERGAN) 25 MG tablet Take 25 mg by mouth every 6 hours as needed for Nausea    Historical Provider, MD   therapeutic multivitamin-minerals (THERAGRAN-M) tablet Take 1 tablet by mouth daily. Historical Provider, MD   omeprazole (PRILOSEC) 20 MG capsule Take 20 mg by mouth Daily    Historical Provider, MD       Current medications:    Current Facility-Administered Medications   Medication Dose Route Frequency Provider Last Rate Last Admin    0.9 % sodium chloride infusion   IntraVENous Continuous Kishan Cortes MD        lactated ringers infusion   IntraVENous Continuous Kishan Cortes MD        lidocaine PF 1 % injection 0.5 mL  0.5 mL IntraDERmal Once Kishan Cortes MD        ceFAZolin (ANCEF) 2,000 mg in dextrose 5 % 50 mL IVPB (mini-bag)  2,000 mg IntraVENous On Call to [address] MD        tranexamic acid (CYKLOKAPRON) 1,000 mg in sodium chloride 0.9 % 60 mL IVPB  1,000 mg IntraVENous On Call to [address] MD        tranexamic acid (CYKLOKAPRON) 1,000 mg in sodium chloride 0.9 % 60 mL IVPB  1,000 mg IntraVENous Once Kishan Cortes MD           Allergies:     Allergies   Allergen Reactions    Bactrim [Sulfamethoxazole-Trimethoprim]      Nausea      Biaxin [Clarithromycin]      nausea    Iv Dye [Iodides] Hives     vomiting    Keflex [Cephalexin]      Nausea with capsules, stated can take IV ancef with no problems    Relpax [Eletriptan]      Heart race    Remeron [Mirtazapine]      Heart races    Verapamil      Heart races    Zomig [Zolmitriptan] Palpitations       Problem List:    Patient Active Problem List   Diagnosis Code    Migraine G43.909    Intractable chronic migraine without aura G43.719    Migraine with aura G43. 109    Menstrually related migraine G43.829    Medication overuse headache G44.40    Hypertension I10    Syncope R55    Bleeding of cervix N88.8    Morbid obesity due to excess calories (HCC) E66.01       Past Medical History:        Diagnosis Date    Asthma     resolved, no meds    Depression     H/O drug overdose     suicide attempt, cardiac arrest    Headache(784.0)     Hyperlipemia     Hypertension     Meniere's disease     PONV (postoperative nausea and vomiting)     Sleep apnea     cpap, does not use    Wears hearing aid     bilateral hearing aids       Past Surgical History:        Procedure Laterality Date    BREAST BIOPSY      CERVIX REMOVAL  2016    CHOLECYSTECTOMY  2008    COLONOSCOPY      ENDOMETRIAL ABLATION      ESOPHAGOGASTRODUODENOSCOPY      FOOT SURGERY Left     2 screws , anchor    HYSTERECTOMY (CERVIX STATUS UNKNOWN)      laparoscopic supracervical hyst for fibroids    LAP BAND  2008    removal    [address]    TOTAL KNEE ARTHROPLASTY Right 2019    TUBAL LIGATION         Social History:    Social History     Tobacco Use    Smoking status: Former     Packs/day: 0.25     Years: 3.00     Pack years: 0.75     Types: Cigarettes     Quit date: 2004     Years since quittin.8    Smokeless tobacco: Never   Substance Use Topics    Alcohol use: No     Comment: RARE                                Counseling given: Not Answered      Vital Signs (Current):   Vitals:    11/10/22 1634 22 0714   BP:  (!) 168/98   Pulse:  83   Resp:  18   Temp:  98.3 °F (36.8 °C)   TempSrc:  Temporal   SpO2: 96%   Weight: 250 lb (113.4 kg) 259 lb (117.5 kg)   Height: 5' 5\" (1.651 m) 5' 5\" (1.651 m)                                              BP Readings from Last 3 Encounters:   11/18/22 (!) 168/98   12/08/16 140/86   11/11/13 (!) 152/97       NPO Status:                                                                                 BMI:   Wt Readings from Last 3 Encounters:   11/18/22 259 lb (117.5 kg)   11/10/22 250 lb (113.4 kg)   11/03/22 250 lb (113.4 kg)     Body mass index is 43.1 kg/m². CBC:   Lab Results   Component Value Date/Time    WBC 6.6 11/28/2016 09:04 PM    RBC 4.50 11/28/2016 09:04 PM    HGB 13.1 11/28/2016 09:04 PM    HCT 39.6 11/28/2016 09:04 PM    MCV 88.0 11/28/2016 09:04 PM    RDW 13.2 11/28/2016 09:04 PM     11/28/2016 09:04 PM       CMP:   Lab Results   Component Value Date/Time     11/28/2016 09:04 PM    K 3.5 12/08/2016 12:06 PM    CL 95 11/28/2016 09:04 PM    CO2 26 11/28/2016 09:04 PM    BUN 11 11/28/2016 09:04 PM    CREATININE 0.6 11/28/2016 09:04 PM    GFRAA >60 11/28/2016 09:04 PM    AGRATIO 1.7 11/28/2016 09:04 PM    LABGLOM >60 11/28/2016 09:04 PM    GLUCOSE 108 11/28/2016 09:04 PM    PROT 7.1 11/28/2016 09:04 PM    CALCIUM 9.7 11/28/2016 09:04 PM    BILITOT <0.2 11/28/2016 09:04 PM    ALKPHOS 93 11/28/2016 09:04 PM    AST 19 11/28/2016 09:04 PM    ALT 22 11/28/2016 09:04 PM       POC Tests: No results for input(s): POCGLU, POCNA, POCK, POCCL, POCBUN, POCHEMO, POCHCT in the last 72 hours.     Coags: No results found for: PROTIME, INR, APTT    HCG (If Applicable): No results found for: PREGTESTUR, PREGSERUM, HCG, HCGQUANT     ABGs: No results found for: PHART, PO2ART, VBF5DRR, FOA1ICH, BEART, L4LXXEOZ     Type & Screen (If Applicable):  No results found for: LABABO, LABRH    Drug/Infectious Status (If Applicable):  No results found for: HIV, HEPCAB    COVID-19 Screening (If Applicable): No results found for: COVID19        Anesthesia Evaluation  Patient summary reviewed and Nursing notes reviewed   history of anesthetic complications: PONV. Airway: Mallampati: I  TM distance: >3 FB   Neck ROM: full  Mouth opening: > = 3 FB   Dental: normal exam         Pulmonary:   (+) sleep apnea:  asthma:     (-) shortness of breath                           Cardiovascular:    (+) hypertension:, hyperlipidemia    (-)  angina                Neuro/Psych:   (+) headaches: migraine headaches,    (-) CVA            ROS comment: meniere's GI/Hepatic/Renal:   (+) morbid obesity     (-) GERD and liver disease       Endo/Other:        (-) diabetes mellitus, hypothyroidism               Abdominal:             Vascular:     - PVD. Other Findings:           Anesthesia Plan      general and regional     ASA 3     (Pt consented for interscalene nerve block for post-operative pain control. Discussed risks/benefits of procedure, including bleeding/infection, nerve injury, LAST. Pt understood and expressed understanding to continue.)  Induction: intravenous. MIPS: Postoperative opioids intended and Prophylactic antiemetics administered. Anesthetic plan and risks discussed with patient. Use of blood products discussed with patient whom. Plan discussed with CRNA.                     Ann Marie Agudelo MD   11/18/2022

## 2022-11-18 NOTE — PROGRESS NOTES
Time out done, o2 on @ 2 l/min per n/c, then versed & fentanyl IV given, then DR Jenna Pandey completed right intrascalene nerve block, patient toleraed procedure well.

## 2022-11-18 NOTE — PROGRESS NOTES
Discharge instructions review with patient and father. All home medications have been reviewed, pt v/u. Discharge instructions signed. Pt discharged via wheelchair. Pt discharged with discharge paperwork and all belongings. Dad taking stable pt home.

## 2022-11-18 NOTE — PROGRESS NOTES
Pt awake and alert at this time. Pt on RA, and VSS. Pt denies pain and nausea, tolerating PO. Skin warm , palpable pulses and able to wiggle right fingers. Pt meets criteria to be discharged from Phase 1.

## 2022-11-18 NOTE — DISCHARGE INSTRUCTIONS
Orthopedic Surgery Discharge Instructions    Medications:   A pain medication has been prescribed for you. Please take this as instructed by the pharmacist.  An anti-nausea medication has been prescribed for you to use as needed for nausea. Either aspirin or a blood thinner medication may have been prescribed for you. Please take this as instructed. Activity:  Must remain in the sling at all times. May remove sling for hygiene purposes and gentle elbow and wrist exercises. Incision/dressings: You may remove your dressing in 72 hours. Until then the dressing needs to remain clean and dry. Following removal dressing please apply dry dressing daily. You may shower in 72 hours and allow the water to run over the incision. However no submerging underwater or getting in pools. Follow-up: If you do not already have a postoperative follow-up appointment scheduled you should call to schedule an appointment within 10 to 14 days of surgery. Emergency or after hours questions:  Please call the main call center at [phone] for all questions or concerns during evening and weekend hours. The call center will direct your call to the on-call physician to answer your questions and concerns. During weekly office hours you may call my assistant, Sissy, at [phone]. Phyllis Astorga MD            Orthopaedic Surgery Sports Medicine and [address] [facility] and [address] [facility] Physician [facility])      [address] your seatbelt home. You are under the influence of drugs-do not drink alcohol, drive, operate machinery, make any important decisions or sign any legal documents for 24 hours. A responsible adult needs to be with you for 24 hours.   You may experience lightheadedness, dizziness, or sleepiness following surgery. Rest at home today- increase activity as tolerated. Progress slowly to a regular diet unless your physician has instructed you otherwise. Drink plenty of water. If persistent nausea and vomiting becomes a problem, call your physician. Coughing, sore throat and muscle aches are other side effects of anesthesia, and should improve with time. Do not drive or operate machinery while taking narcotics. Females of childbearing potential and on hormonal birth control, should use two forms of contraception following procedure if given a medication called sugammadex and/or emend. Additional contraception should be used for 7 days for sugammadex and/or 28 days for emend. These medications have a potential to reduce the effectiveness of hormonal birth control.

## 2022-11-18 NOTE — PROGRESS NOTES
Pt arrived from OR to PACU bay 6. Reported received from [address] [place] [address] staff. Pt arousable to voice. Surgical incisions dressings in place to right shoulder. Pt on 6L simple mask-OA removed upon arrival. NSR, and VSS. Will continue to monitor.

## 2022-11-21 ENCOUNTER — TELEPHONE (OUTPATIENT)
Dept: ORTHOPEDIC SURGERY | Age: 57
End: 2022-11-21

## 2022-11-21 NOTE — TELEPHONE ENCOUNTER
General Question     Subject: DISCHARGE MEDS  Patient and /or Facility Request: Tommie Garcia  Contact Number: [phone]      THE PT STATES THAT HER DISCHARGE PAPERWORK DOESN'T HAVE WHAT MEDICINES SHE'S SUPPOSED TO GO BACK ON. SHE WANTS SOMEONE TO CALL HER AND GO OVER THE MEDICATIONS WITH HER.   PLEASE RETURN HER CALL AT THE ABOVE PHONE #

## 2022-11-21 NOTE — TELEPHONE ENCOUNTER
SPOKE WITH PATIENT. SHE SHOULD AVOID NSAIDS WHILE TAKING HER PO ASPIRIN, BUT ONCE ASPIRIN IS DONE SHE MAY RESUME NSAID. OTHERWISE, MAY RESUME ALL EVERY DAY MEDICATIONS PREVIOUSLY PRESCRIBED. ALL QUESTIONS ANSWERED. PATIENT EXPRESSED UNDERSTANDING.

## 2022-11-23 NOTE — OP NOTE
Operative Note      Patient: Sukhdev Britt  YOB: 1965  MRN: [id number]    Date of Procedure: 11/18/2022    Pre-Op Diagnosis: Tear of right rotator cuff, unspecified tear extent, unspecified whether traumatic [M75.101]  Shoulder impingement, right [M75.41]    Post-Op Diagnosis: Same       Procedure(s):  RIGHT SHOULDER ARTHROSCOPY; SUBACROMIAL DECOMPRESSION, ROTATOR CUFF TENDON REPAIR - BLOCK, EXCISION OF RIGHT UPPER ARM/SHOULDER, extensive diffuse debridement    Surgeon(s):  Meg Maria MD    Assistant:   Surgical Assistant: Maikol Hamm    Anesthesia: General    Estimated Blood Loss (mL): Minimal    Complications: None    Specimens:   ID Type Source Tests Collected by Time Destination   A : RIGHT UPPER ARM/SHOULDER CYST Specimen Cyst SURGICAL PATHOLOGY Meg Maria MD 11/18/2022 1032        Implants:  Implant Name Type Inv. Item Serial No.  Lot No. LRB No. Used Action   ANCHOR SUT 2-0 DIA5. 5MM [employer] PRE[employer] - [id number]  ANCHOR SUT 2-0 DIA5. 5MM [employer] [facility] [facility] [employer] [id number] Right 1 Implanted   ANCHOR SUT 2-0 DIA5. 5MM [employer] - [id number]  ANCHOR SUT 2-0 DIA5. 5MM [employer] [facility] [employer] [id number] Right 1 Implanted         Drains: * No LDAs found *    Findings: per dictation    Detailed Description of Procedure: The patient was met in the preoperative holding area. Informed consent was obtained. Interscalene block was placed by anesthesia. She was taken back to the operative room and transferred to the table. General anesthesia was performed. He was placed in the beachchair position. All bony prominences were well-padded. Bilateral SCDs were placed. Upper extremity was prepped and draped using standard sterile technique. Formal timeout was performed in which the correct patient, surgical site, and procedure was reaffirmed.   Preventive antibiotics were given within 30 minutes of skin incision. Initial incision was made just inferior medial to the posterior lateral border of the acromion. Trocar was introduced into the glenohumeral joint. Arthroscope was introduced. Diagnostic arthroscopy was performed. Anterior portal was created via an outside in technique. The subscapularis was intact. The glenoid and the humeral head did demonstrate areas of grade 1 chondral changes and fraying anteriorly. An arthroscopic shaver was introduced to perform a debridement of the chondral surface. In addition there was fraying of the anterior and superior labrum. This was debrided with the arthroscopic shaver. The long head of the biceps tendon was intact. It was noted that there was high-grade partial versus full-thickness tearing of the supraspinatus and infraspinatus along the articular surface margin. The subscapularis was intact. Arthroscope was taken to the subacromial space. A lateral as well as anterior lateral and posterior lateral working portals were created. Extensive debridement of the subacromial bursal tissue was performed. There was complete tearing of the rotator cuff involving the supraspinatus and infraspinatus. Overall tissue quality was good. A debridement of the degenerative tissue was performed until we reached healthy appearing tissue. Greater tuberosity was prepared with our bur to create a bleeding surface. incision was made off the lateral border the acromion. We placed 2 triple loaded anchors. This was the [address] [place] [address] 5.5 mm helicoil triple loaded anchor. Excellent purchase was achieved. The suture limbs were placed in a mattress fashion. There was an anterior inferior spur presents along the acromion. Arthroscopic bur was inserted to resect the undersurface completing the subacromial decompression. At that point we removed all arthroscopic instruments.   We then prepared for the mass excision. This mass was on the lateral aspect of the upper arm. This was right in the subcutaneous region. It did measure approximately 1 to 2 cm in diameter. We made initial incision. We then encountered tissue that appeared similar to like a sebaceous cyst.  We were able to excavate and excise this cyst.  It did appear benign in nature. We sent this for pathology. Following that this area was thoroughly irrigated. Hemostasis was achieved. Closure of this area was performed with 2-0 Vicryl followed by 3-0 nylon. Portal sites were closed using 3-0 nylon in interrupted fashion. Sterile dressings were applied in the form of Xeroform, 4 x 4's, ABD, and foam tape. She was placed into an UltraSling and successfully extubated. Then the procedure all counts were correct and I was present for entire case.     Electronically signed by Hadley Mena MD on 11/23/2022 at 10:42 AM

## 2022-12-01 ENCOUNTER — OFFICE VISIT (OUTPATIENT)
Dept: ORTHOPEDIC SURGERY | Age: 57
End: 2022-12-01

## 2022-12-01 VITALS — WEIGHT: 259 LBS | HEIGHT: 65 IN | BODY MASS INDEX: 43.15 KG/M2

## 2022-12-01 DIAGNOSIS — M75.121 COMPLETE TEAR OF RIGHT ROTATOR CUFF, UNSPECIFIED WHETHER TRAUMATIC: Primary | ICD-10-CM

## 2022-12-01 DIAGNOSIS — M75.41 IMPINGEMENT SYNDROME OF RIGHT SHOULDER: ICD-10-CM

## 2022-12-01 PROCEDURE — 99024 POSTOP FOLLOW-UP VISIT: CPT | Performed by: ORTHOPAEDIC SURGERY

## 2022-12-01 RX ORDER — HYDROCODONE BITARTRATE AND ACETAMINOPHEN 10; 325 MG/1; MG/1
1 TABLET ORAL EVERY 4 HOURS PRN
Qty: 30 TABLET | Refills: 0 | Status: SHIPPED | OUTPATIENT
Start: 2022-12-01 | End: 2022-12-08

## 2022-12-06 NOTE — PROGRESS NOTES
11/3/2022     Reason for visit:  Status post right shoulder arthroscopy with rotator cuff tendon repair and subacromial decompression on 11/18/2022    History of Present Illness:  Patient returns for postop evaluation. Overall she is doing well. He denies fever or chills. No numbness or tingling. He is using the sling as instructed. Objective:      Physical Exam:  The patient is well-appearing and in no apparent distress  Neg Spurling's test  Examination of the rightt shoulder  There is no swelling, ecchymosis, or gross deformity  There is no evidence of muscle atrophy  neg Oates test, neg Neers test  neg bicipital groove tenderness, no AC joint tenderness  No pain with gentle motion of shoulder  Intact motor and sensory function throughout the median/radial/ulnar/PIN/AIN distributions  Palpable radial pulse, brisk cap refill, 2+ symmetric reflexes       Assessment:  Status post right shoulder arthroscopy with rotator cuff tendon repair and subacromial decompression on 11/18/2022    Plan:  Overall the patient is doing well. Continue sling immobilization. Start physical therapy. Return in 4 weeks.

## 2022-12-27 ENCOUNTER — OFFICE VISIT (OUTPATIENT)
Dept: ORTHOPEDIC SURGERY | Age: 57
End: 2022-12-27

## 2022-12-27 VITALS — WEIGHT: 259 LBS | BODY MASS INDEX: 43.15 KG/M2 | HEIGHT: 65 IN

## 2022-12-27 DIAGNOSIS — G89.18 POST-OP PAIN: ICD-10-CM

## 2022-12-27 DIAGNOSIS — M75.121 COMPLETE TEAR OF RIGHT ROTATOR CUFF, UNSPECIFIED WHETHER TRAUMATIC: Primary | ICD-10-CM

## 2022-12-27 DIAGNOSIS — M75.41 IMPINGEMENT SYNDROME OF RIGHT SHOULDER: ICD-10-CM

## 2022-12-27 PROCEDURE — 99024 POSTOP FOLLOW-UP VISIT: CPT | Performed by: ORTHOPAEDIC SURGERY

## 2022-12-27 RX ORDER — HYDROCODONE BITARTRATE AND ACETAMINOPHEN 10; 325 MG/1; MG/1
1 TABLET ORAL EVERY 4 HOURS PRN
Qty: 30 TABLET | Refills: 0 | Status: SHIPPED | OUTPATIENT
Start: 2022-12-27 | End: 2023-01-03

## 2022-12-27 NOTE — PROGRESS NOTES
11/3/2022     Reason for visit:  Status post right shoulder arthroscopy with rotator cuff tendon repair and subacromial decompression on 11/18/2022    History of Present Illness:  Patient returns for postop evaluation. Overall she is doing well. He denies fever or chills. No numbness or tingling. She is using the sling as instructed. She does report some continued burning sensation of the left thigh and upper leg region. Objective:      Physical Exam:  The patient is well-appearing and in no apparent distress  Neg Spurling's test  Examination of the rightt shoulder  There is no swelling, ecchymosis, or gross deformity  There is no evidence of muscle atrophy  neg Oates test, neg Neers test  neg bicipital groove tenderness, no AC joint tenderness  No pain with gentle motion of shoulder  Intact motor and sensory function throughout the median/radial/ulnar/PIN/AIN distributions  Palpable radial pulse, brisk cap refill, 2+ symmetric reflexes       Assessment:  Status post right shoulder arthroscopy with rotator cuff tendon repair and subacromial decompression on 11/18/2022    Plan:  Overall the patient is doing well guards to her shoulder. We will discontinue the sling this Friday. Continue physical therapy. Regards to the thigh burning sensation I suspect this is likely coming from her lumbar spine. She is getting therapy for that currently. She has also seen Dr. Tamia Rebolledo for this and follow-up with him.

## 2023-01-03 ENCOUNTER — TELEPHONE (OUTPATIENT)
Dept: ORTHOPEDIC SURGERY | Age: 58
End: 2023-01-03

## 2023-01-04 DIAGNOSIS — M51.36 DDD (DEGENERATIVE DISC DISEASE), LUMBAR: ICD-10-CM

## 2023-01-04 DIAGNOSIS — M48.061 DEGENERATIVE LUMBAR SPINAL STENOSIS: ICD-10-CM

## 2023-01-04 DIAGNOSIS — M47.816 LUMBAR SPONDYLOSIS: ICD-10-CM

## 2023-01-04 DIAGNOSIS — M48.062 SPINAL STENOSIS OF LUMBAR REGION WITH NEUROGENIC CLAUDICATION: ICD-10-CM

## 2023-01-04 RX ORDER — TIZANIDINE 4 MG/1
TABLET ORAL
Qty: 30 TABLET | Refills: 0 | Status: SHIPPED | OUTPATIENT
Start: 2023-01-04

## 2023-02-05 ENCOUNTER — PATIENT MESSAGE (OUTPATIENT)
Dept: ORTHOPEDIC SURGERY | Age: 58
End: 2023-02-05

## 2023-02-05 DIAGNOSIS — M48.062 SPINAL STENOSIS OF LUMBAR REGION WITH NEUROGENIC CLAUDICATION: ICD-10-CM

## 2023-02-05 DIAGNOSIS — M47.816 LUMBAR SPONDYLOSIS: ICD-10-CM

## 2023-02-05 DIAGNOSIS — M51.36 DDD (DEGENERATIVE DISC DISEASE), LUMBAR: ICD-10-CM

## 2023-02-05 DIAGNOSIS — M75.121 COMPLETE TEAR OF RIGHT ROTATOR CUFF, UNSPECIFIED WHETHER TRAUMATIC: Primary | ICD-10-CM

## 2023-02-05 DIAGNOSIS — M48.061 DEGENERATIVE LUMBAR SPINAL STENOSIS: ICD-10-CM

## 2023-02-06 RX ORDER — HYDROCODONE BITARTRATE AND ACETAMINOPHEN 10; 325 MG/1; MG/1
1 TABLET ORAL EVERY 4 HOURS PRN
Qty: 30 TABLET | Refills: 0 | Status: CANCELLED | OUTPATIENT
Start: 2023-02-06 | End: 2023-02-13

## 2023-02-06 RX ORDER — TRAMADOL HYDROCHLORIDE 50 MG/1
50 TABLET ORAL EVERY 4 HOURS PRN
Qty: 30 TABLET | Refills: 0 | Status: SHIPPED | OUTPATIENT
Start: 2023-02-06 | End: 2023-02-13

## 2023-02-06 RX ORDER — TIZANIDINE 4 MG/1
4 TABLET ORAL EVERY 8 HOURS PRN
Qty: 30 TABLET | Refills: 0 | Status: SHIPPED | OUTPATIENT
Start: 2023-02-06

## 2023-02-06 NOTE — TELEPHONE ENCOUNTER
Patient is S/P Status post right shoulder arthroscopy with rotator cuff tendon repair and subacromial decompression on 11/18/2022    Last fill was 12/27/22. Please sign if appropriate.

## 2023-02-06 NOTE — TELEPHONE ENCOUNTER
From: Akash Trevino  To: Dr. Cameron Johnson: 2023 6:27 PM EST  Subject: Prescription Refill    Dr. Mariusz Krishnan,  I am in need of a refill for hydrocodone-acetaminophen  mg tabs. Could this please be sent to [address]; phone number [phone].   Thank you for your time,  Karen Orantes    [address]  Phone [phone]

## 2023-02-16 ENCOUNTER — OFFICE VISIT (OUTPATIENT)
Dept: ORTHOPEDIC SURGERY | Age: 58
End: 2023-02-16

## 2023-02-16 VITALS — WEIGHT: 259 LBS | BODY MASS INDEX: 43.15 KG/M2 | HEIGHT: 65 IN

## 2023-02-16 DIAGNOSIS — M25.511 RIGHT SHOULDER PAIN, UNSPECIFIED CHRONICITY: Primary | ICD-10-CM

## 2023-02-16 PROCEDURE — 99024 POSTOP FOLLOW-UP VISIT: CPT | Performed by: ORTHOPAEDIC SURGERY

## 2023-02-20 NOTE — PROGRESS NOTES
11/3/2022     Reason for visit:  Status post right shoulder arthroscopy with rotator cuff tendon repair and subacromial decompression on 11/18/2022    History of Present Illness:  Patient returns for postop evaluation. The patient does report that she has been having pain at night. She does report some difficulties with her range of motion. Objective:      Physical Exam:  The patient is well-appearing and in no apparent distress  Neg Spurling's test  Examination of the rightt shoulder  There is no swelling, ecchymosis, or gross deformity  There is no evidence of muscle atrophy  neg Oates test, neg Neers test  neg bicipital groove tenderness, no AC joint tenderness  Her motion demonstrates 90 to 100 degrees of forward flexion and abduction  Intact motor and sensory function throughout the median/radial/ulnar/PIN/AIN distributions  Palpable radial pulse, brisk cap refill, 2+ symmetric reflexes       Assessment:  Status post right shoulder arthroscopy with rotator cuff tendon repair and subacromial decompression on 11/18/2022    Plan:  Unfortunately patient is still having pain. In addition her motion has not improved to the extent that I would expect. As result I would repeat the MRI of the shoulder. She is in agreement. Following that she will return to review the results.

## 2023-03-12 DIAGNOSIS — M48.062 SPINAL STENOSIS OF LUMBAR REGION WITH NEUROGENIC CLAUDICATION: ICD-10-CM

## 2023-03-12 DIAGNOSIS — M51.36 DDD (DEGENERATIVE DISC DISEASE), LUMBAR: ICD-10-CM

## 2023-03-12 DIAGNOSIS — M48.061 DEGENERATIVE LUMBAR SPINAL STENOSIS: ICD-10-CM

## 2023-03-12 DIAGNOSIS — M47.816 LUMBAR SPONDYLOSIS: ICD-10-CM

## 2023-03-13 RX ORDER — TIZANIDINE 4 MG/1
4 TABLET ORAL EVERY 8 HOURS PRN
Qty: 30 TABLET | Refills: 0 | Status: SHIPPED | OUTPATIENT
Start: 2023-03-13

## 2023-03-15 NOTE — PROGRESS NOTES
Name_______________________________________Printed:____________________  Date and time of surgery___11/18/2022___0730__________________Arrival Time:___0600  masc_____________   1. The instructions given regarding when and if a patient needs to stop oral intake prior to surgery varies. Follow the specific instructions you were given                  _x__Nothing to eat or to drink after Midnight the night before.                   ____Carbo loading or ERAS instructions will be given to select patients-if you have been given those instructions -please do the following                           The evening before your surgery after dinner before midnight drink 40 ounces of gatorade. If you are diabetic use sugar free. The morning of surgery drink 40 ounces of water. This needs to be finished 3 hours prior to your surgery start time. 2. Take the following pills with a small sip of water on the morning of surgery__omeprazole_________________________________________________                  Do not take blood pressure medications ending in pril or sartan the bhaskar prior to surgery or the morning of surgery_   3. Aspirin, Ibuprofen, Advil, Naproxen, Vitamin E and other Anti-inflammatory products and supplements should be stopped for 5 -7days before surgery or as directed by your physician. 4. Check with your Doctor regarding stopping Plavix, Coumadin,Eliquis, Lovenox,Effient,Pradaxa,Xarelto, Fragmin or other blood thinners and follow their instructions. 5. Do not smoke, and do not drink any alcoholic beverages 24 hours prior to surgery. This includes NA Beer. Refrain from the usage of any recreational drugs. 6. You may brush your teeth and gargle the morning of surgery. DO NOT SWALLOW WATER   7. You MUST make arrangements for a responsible adult to stay on site while you are here and take you home after your surgery. You will not be allowed to leave alone or drive yourself home.   It is strongly suggested someone stay with you the first 24 hrs. Your surgery will be cancelled if you do not have a ride home. 8. A parent/legal guardian must accompany a child scheduled for surgery and plan to stay at the hospital until the child is discharged. Please do not bring other children with you. 9. Please wear simple, loose fitting clothing to the hospital.  Fredy Duff not bring valuables (money, credit cards, checkbooks, etc.) Do not wear any makeup (including no eye makeup) or nail polish on your fingers or toes. 10. DO NOT wear any jewelry or piercings on day of surgery. All body piercing jewelry must be removed. 11. If you have ___dentures, they will be removed before going to the OR; we will provide you a container. If you wear ___contact lenses or __x_glasses, they will be removed; please bring a case for them. 12. Please see your family doctor/pediatrician for a history & physical and/or concerning medications. Bring any test results/reports from your physician's office. PCP__________________Phone___________H&P Appt. Date________             13 If you  have a Living Will and Durable Power of  for Healthcare, please bring in a copy. 15. Notify your Surgeon if you develop any illness between now and surgery  time, cough, cold, fever, sore throat, nausea, vomiting, etc.  Please notify your surgeon if you experience dizziness, shortness of breath or blurred vision between now & the time of your surgery             15. DO NOT shave your operative site 96 hours prior to surgery. For face & neck surgery, men may use an electric razor 48 hours prior to surgery. 16. Shower the night before or morning of surgery using an antibacterial soap or as you have been instructed. 17. To provide excellent care visitors will be limited to one in the room at any given time. 18.  Please bring picture ID and insurance card.              19.  Visit our web site for additional information:  [email or web address]              20.During flu season no children under the age of 15 are permitted in the hospital for the safety of all patients. 21. If you take a long acting insulin in the evening only  take half of your usual  dose the night  before your procedure              22. If you use a c-pap please bring DOS if staying overnight,             23.For your convenience [facility] has a pharmacy on site to fill your prescriptions. 24. If you use oxygen and have a portable tank please bring it  with you the DOS             25. Bring a complete list of all your medications with name and dose include any supplements. 26. Other__________________________________________   *Please call pre admission testing if you any further questions   Brenden Bowen   [address] [place] [address]. [facility]  [phone]   [address] [place] [address]       VISITOR POLICY(subject to change)    Current policy is 2 visitors per patient. No children. Mask is  at the discretion of the facility. Visiting hours are 8a-8p. Overnight visitors will be at the discretion of the nurse. All policies subject to change. All above information reviewed with patient in person or by phone. Patient verbalizes understanding. All questions and concerns addressed.                                                                                                  Patient/Rep_patient___________________                                                                                                                                    PRE OP INSTRUCTIONS none

## 2023-04-18 ENCOUNTER — TELEPHONE (OUTPATIENT)
Dept: CARDIOLOGY CLINIC | Age: 58
End: 2023-04-18

## 2023-04-18 NOTE — TELEPHONE ENCOUNTER
Pt had abnormal stress test at [facility]. Being referred by Dr Malou Broussard. She is having chest pains, sob, elevated bp for 3 weeks. Would like to see [facility] if possible, but willing to see whoever can see her the quickest. Please advise.

## 2023-04-18 NOTE — TELEPHONE ENCOUNTER
Called patient she has been having high blood pressure 150/90,178/118 x 1 time. C/o pain center of chest,pain between shoulder blade. and jaw pain x 3 weeks. Had chest pain yesterday before stress test @ [facility] but not during. Has a strong family hx of heart problems on dads side. Patient is requesting soonest appointment . She does not have any symptoms now,and advised ER if symptoms persist.Patient verbalized understanding. Please advise where she can be seen. Thanks

## 2023-04-19 ENCOUNTER — TELEPHONE (OUTPATIENT)
Dept: CARDIOLOGY CLINIC | Age: 58
End: 2023-04-19

## 2023-04-19 NOTE — TELEPHONE ENCOUNTER
Patient was referred by Dr. Edison Robledo to the [facility] location with Dr. Makayla Galan. Patient has been scheduled for 05/10/23 at 9:30 (am/pm). Patient verbalized understanding of appointment instructions. Call complete.

## 2023-05-02 PROBLEM — R07.9 CHEST PAIN: Status: ACTIVE | Noted: 2023-05-02

## 2023-05-02 PROBLEM — R94.39 ABNORMAL STRESS TEST: Status: ACTIVE | Noted: 2023-05-02

## 2023-05-02 PROBLEM — R06.02 SOB (SHORTNESS OF BREATH): Status: ACTIVE | Noted: 2023-05-02

## 2023-05-10 ENCOUNTER — OFFICE VISIT (OUTPATIENT)
Dept: CARDIOLOGY CLINIC | Age: 58
End: 2023-05-10
Payer: COMMERCIAL

## 2023-05-10 VITALS
DIASTOLIC BLOOD PRESSURE: 90 MMHG | HEART RATE: 79 BPM | SYSTOLIC BLOOD PRESSURE: 134 MMHG | WEIGHT: 264 LBS | HEIGHT: 65 IN | BODY MASS INDEX: 43.99 KG/M2 | OXYGEN SATURATION: 99 %

## 2023-05-10 DIAGNOSIS — R07.9 CHEST PAIN, UNSPECIFIED TYPE: ICD-10-CM

## 2023-05-10 DIAGNOSIS — R06.02 SOB (SHORTNESS OF BREATH): ICD-10-CM

## 2023-05-10 DIAGNOSIS — I10 HYPERTENSION, UNSPECIFIED TYPE: ICD-10-CM

## 2023-05-10 DIAGNOSIS — I20.1 CORONARY ARTERY VASOSPASM (HCC): ICD-10-CM

## 2023-05-10 DIAGNOSIS — Z91.041 ALLERGY TO IODINATED CONTRAST: ICD-10-CM

## 2023-05-10 DIAGNOSIS — R94.39 ABNORMAL STRESS TEST: ICD-10-CM

## 2023-05-10 DIAGNOSIS — Z71.3 WEIGHT LOSS COUNSELING, ENCOUNTER FOR: Primary | ICD-10-CM

## 2023-05-10 PROCEDURE — 99244 OFF/OP CNSLTJ NEW/EST MOD 40: CPT | Performed by: INTERNAL MEDICINE

## 2023-05-10 PROCEDURE — G8417 CALC BMI ABV UP PARAM F/U: HCPCS | Performed by: INTERNAL MEDICINE

## 2023-05-10 PROCEDURE — 3075F SYST BP GE 130 - 139MM HG: CPT | Performed by: INTERNAL MEDICINE

## 2023-05-10 PROCEDURE — 3080F DIAST BP >= 90 MM HG: CPT | Performed by: INTERNAL MEDICINE

## 2023-05-10 PROCEDURE — 93000 ELECTROCARDIOGRAM COMPLETE: CPT | Performed by: INTERNAL MEDICINE

## 2023-05-10 PROCEDURE — G8427 DOCREV CUR MEDS BY ELIG CLIN: HCPCS | Performed by: INTERNAL MEDICINE

## 2023-05-10 RX ORDER — LISINOPRIL 20 MG/1
TABLET ORAL
Qty: 30 TABLET | Refills: 1
Start: 2023-05-10

## 2023-05-10 RX ORDER — ISOSORBIDE MONONITRATE 30 MG/1
30 TABLET, EXTENDED RELEASE ORAL DAILY
Qty: 90 TABLET | Refills: 3 | Status: SHIPPED | OUTPATIENT
Start: 2023-05-10

## 2023-05-10 RX ORDER — NITROGLYCERIN 0.4 MG/1
0.4 TABLET SUBLINGUAL EVERY 5 MIN PRN
Qty: 25 TABLET | Refills: 3 | Status: SHIPPED | OUTPATIENT
Start: 2023-05-10

## 2023-05-10 NOTE — PATIENT INSTRUCTIONS
Take isosorbide 30mg once a day:   -Take Tylenol if you develop a headache. Take NTG tablets as needed for chest pain:   -Place one under your tongue while sitting -- it will dissolve. -Go to the ER if you need more than 2.   -No more than 3 tablets in a 15 minute period.   -This can cause a drop in blood pressure so stand up slowly. -Take Tylenol if you develop a head ache. Hold Lisinopril for now. -Be sure to check your blood pressure    Call if symptoms worsen or change. Consider scheduling a coronary calcium score ([address] [place] [address], [address] [place] [address], or [address] [place] [address] or other healthcare system):  -You usually do not need an order. Referral sent to Dr. Lauri Kawasaki office.

## 2023-05-10 NOTE — PROGRESS NOTES
[facility]  [phone]  5/10/23  Referring: Dr. Carli Cobian MD (PCP)    REASON FOR CONSULT/CHIEF COMPLAINT/HPI     Reason for visit/ Chief complaint  Chest Pain/Pressure  Dyspnea on Exertion   HPI Modesta Douglass is a 61 y. o.new female patient here by referral from Dr Satinder Duval for Abnormal Stress test, Shortness of Breath, Chest Pain, and Elevated BP. She is treated for hyperlipidemia and hypertension. Her dad's side had heart issues; her father lives with her. Her mother had cancer but  2023 from influenza. Her brother  in a boating accident a few years ago. Per PCP note on 4/10/23 she reported CP over the past few weeks, intermittent and happens at rest at times. More Prominent when she is exerting herself and working around the house. Last from a few minutes up to 15min at a time. Radiates to her Shoulder blades and makes her SOB. She has history of an angiogram and cardiac stress test in past that was unremarkable in . Nuclear stress test 2023 was read as normal but with LVEF of 92% which is obviously an error. Today, she states that years ago, she had chest pain and was seen at [facility] (she thinks), given NTG which helped. She has had flutters in the past which causes breathlessness, usually lasted seconds. Now, she reports that she does develop left sided chest discomfort with radiation to her lower jaw and shoulder blade -- usually occurs with exertional efforts, becomes out of breath. Increased dosing of Prilosec by her PCP helped at first, but the intermittent pains have returned. She admits to not handling stress well and has a lot of anxiety; her son left for a 4 days school trip to [address] [place] [address] which is very stressful for her. She denies exertional chest pain, PND, palpitations, light-headedness. She has mild LE edema at times.  She has chronic back pain from disk disease; she's had a right knee replacement , the other needs

## 2023-05-11 ENCOUNTER — TELEPHONE (OUTPATIENT)
Dept: CARDIOLOGY CLINIC | Age: 58
End: 2023-05-11

## 2023-05-11 DIAGNOSIS — Z13.6 SCREENING FOR HEART DISEASE: Primary | ICD-10-CM

## 2023-05-11 NOTE — TELEPHONE ENCOUNTER
Order placed per [facility] message and patient called and informed. Verbalized understanding and has no further questions at this time.

## 2023-05-11 NOTE — TELEPHONE ENCOUNTER
Pt called and states [facility] Scheduling says she does need orders for the Coronary Calcium Scoring. Pt asking that orders be placed in her chart and then notify her when it is done so she can get scheduled. Please advise.

## 2023-05-18 ENCOUNTER — TELEPHONE (OUTPATIENT)
Dept: CARDIOLOGY CLINIC | Age: 58
End: 2023-05-18

## 2023-05-18 RX ORDER — RANOLAZINE 500 MG/1
500 TABLET, EXTENDED RELEASE ORAL 2 TIMES DAILY
Qty: 180 TABLET | Refills: 3 | Status: SHIPPED | OUTPATIENT
Start: 2023-05-18

## 2023-05-18 NOTE — TELEPHONE ENCOUNTER
Pt states since starting isosorbide she is having severe headache that intensifies when she stand up.  Please call to advise

## 2023-05-18 NOTE — TELEPHONE ENCOUNTER
Called patient per [facility] message. Pt is agreeable to stop the Imdur and start Renexa. Script sent to pharmacy verified. No further questions a this time.

## 2023-06-02 ENCOUNTER — HOSPITAL ENCOUNTER (OUTPATIENT)
Dept: CT IMAGING | Age: 58
Discharge: HOME | End: 2023-06-02
Payer: COMMERCIAL

## 2023-06-02 DIAGNOSIS — Z13.6 SCREENING FOR HEART DISEASE: ICD-10-CM

## 2023-06-02 PROCEDURE — 75571 CT HRT W/O DYE W/CA TEST: CPT

## 2023-06-09 ENCOUNTER — HOSPITAL ENCOUNTER (OUTPATIENT)
Dept: CARDIOLOGY | Age: 58
Discharge: HOME | End: 2023-06-09
Payer: COMMERCIAL

## 2023-06-09 DIAGNOSIS — R07.9 CHEST PAIN, UNSPECIFIED TYPE: ICD-10-CM

## 2023-06-09 DIAGNOSIS — I10 HYPERTENSION, UNSPECIFIED TYPE: ICD-10-CM

## 2023-06-09 DIAGNOSIS — R06.02 SOB (SHORTNESS OF BREATH): ICD-10-CM

## 2023-06-09 LAB
LV EF: 58 %
LVEF MODALITY: NORMAL

## 2023-06-09 PROCEDURE — 93306 TTE W/DOPPLER COMPLETE: CPT

## 2023-06-12 ENCOUNTER — TELEPHONE (OUTPATIENT)
Dept: CARDIOLOGY CLINIC | Age: 58
End: 2023-06-12

## 2023-06-12 NOTE — TELEPHONE ENCOUNTER
----- Message from Kathleen Contreras MD sent at 6/9/2023  4:13 PM EDT -----  Please let her know her echo was normal

## 2023-06-14 DIAGNOSIS — I51.89 DIASTOLIC DYSFUNCTION: ICD-10-CM

## 2023-06-14 DIAGNOSIS — R06.02 SOB (SHORTNESS OF BREATH): ICD-10-CM

## 2023-06-14 LAB — NT-PROBNP SERPL-MCNC: <36 PG/ML (ref 0–124)

## 2023-06-26 ENCOUNTER — TELEPHONE (OUTPATIENT)
Dept: CARDIOLOGY CLINIC | Age: 58
End: 2023-06-26

## 2023-06-26 RX ORDER — AMLODIPINE BESYLATE 2.5 MG/1
2.5 TABLET ORAL DAILY
Qty: 90 TABLET | Refills: 3 | Status: CANCELLED | OUTPATIENT
Start: 2023-06-26

## 2023-06-27 RX ORDER — AMLODIPINE BESYLATE 5 MG/1
5 TABLET ORAL DAILY
Qty: 90 TABLET | Refills: 3 | Status: SHIPPED | OUTPATIENT
Start: 2023-06-27

## 2023-06-29 ENCOUNTER — TELEPHONE (OUTPATIENT)
Dept: CARDIOLOGY CLINIC | Age: 58
End: 2023-06-29

## 2023-09-09 SDOH — HEALTH STABILITY: PHYSICAL HEALTH: ON AVERAGE, HOW MANY DAYS PER WEEK DO YOU ENGAGE IN MODERATE TO STRENUOUS EXERCISE (LIKE A BRISK WALK)?: 0 DAYS

## 2023-09-11 SDOH — HEALTH STABILITY: PHYSICAL HEALTH: ON AVERAGE, HOW MANY DAYS PER WEEK DO YOU ENGAGE IN MODERATE TO STRENUOUS EXERCISE (LIKE A BRISK WALK)?: 0 DAYS

## 2023-09-11 ASSESSMENT — SOCIAL DETERMINANTS OF HEALTH (SDOH)
WITHIN THE LAST YEAR, HAVE YOU BEEN HUMILIATED OR EMOTIONALLY ABUSED IN OTHER WAYS BY YOUR PARTNER OR EX-PARTNER?: NO
WITHIN THE LAST YEAR, HAVE YOU BEEN AFRAID OF YOUR PARTNER OR EX-PARTNER?: NO
WITHIN THE LAST YEAR, HAVE TO BEEN RAPED OR FORCED TO HAVE ANY KIND OF SEXUAL ACTIVITY BY YOUR PARTNER OR EX-PARTNER?: NO
WITHIN THE LAST YEAR, HAVE YOU BEEN KICKED, HIT, SLAPPED, OR OTHERWISE PHYSICALLY HURT BY YOUR PARTNER OR EX-PARTNER?: NO

## 2023-09-12 ENCOUNTER — OFFICE VISIT (OUTPATIENT)
Dept: ORTHOPEDIC SURGERY | Age: 58
End: 2023-09-12

## 2023-09-12 VITALS — WEIGHT: 257 LBS | HEIGHT: 65 IN | BODY MASS INDEX: 42.82 KG/M2

## 2023-09-12 DIAGNOSIS — M47.812 CERVICAL SPONDYLOSIS: ICD-10-CM

## 2023-09-12 DIAGNOSIS — M50.20 CERVICAL DISCOGENIC PAIN SYNDROME: ICD-10-CM

## 2023-09-12 DIAGNOSIS — M50.30 DDD (DEGENERATIVE DISC DISEASE), CERVICAL: ICD-10-CM

## 2023-09-12 DIAGNOSIS — M54.2 NECK PAIN: ICD-10-CM

## 2023-09-12 DIAGNOSIS — R26.89 BALANCE DISORDER: Primary | ICD-10-CM

## 2023-09-12 RX ORDER — TIZANIDINE 4 MG/1
4 TABLET ORAL 3 TIMES DAILY PRN
Qty: 30 TABLET | Refills: 1 | Status: SHIPPED | OUTPATIENT
Start: 2023-09-12

## 2023-09-12 NOTE — PROGRESS NOTES
Occurrences:   1     Standing Expiration Date:   10/11/2023     Order Specific Question:   Reason for exam:     Answer:   PAIN    MRI CERVICAL SPINE WO CONTRAST     Christa SPRINGER, pt will call to schedule. [facility] will obtain auth and fwd to your facility  Pt advised to f/u in clinic 1-2 days after MRI for results. Standing Status:   Future     Standing Expiration Date:   9/12/2024     Order Specific Question:   Reason for exam:     Answer:   r/o HNP, stenosis, spinal cord lesion, h/o gait disturbance     Order Specific Question:   What is the sedation requirement? Answer:   None           Disclaimer: \"This note was dictated with voice recognition software. Though review and correction are routine, we apologize for any errors. \"

## 2023-09-13 ENCOUNTER — TELEPHONE (OUTPATIENT)
Dept: ORTHOPEDIC SURGERY | Age: 58
End: 2023-09-13

## 2023-09-13 NOTE — TELEPHONE ENCOUNTER
General Question     Subject: REQUESTING A REFERRAL FOR A MRI AT [facility]. YOU CAN CALL [phone]  OR DEMARIO DENNIS  REQUESTING A CALL BACK.   Patient: Geraldo Lawson  Contact Number: [phone]

## 2023-09-14 ENCOUNTER — OFFICE VISIT (OUTPATIENT)
Dept: ORTHOPEDIC SURGERY | Age: 58
End: 2023-09-14

## 2023-09-14 VITALS — HEIGHT: 65 IN | WEIGHT: 257 LBS | BODY MASS INDEX: 42.82 KG/M2

## 2023-09-14 DIAGNOSIS — M25.571 CHRONIC PAIN OF BOTH ANKLES: ICD-10-CM

## 2023-09-14 DIAGNOSIS — G89.29 CHRONIC PAIN OF BOTH ANKLES: ICD-10-CM

## 2023-09-14 DIAGNOSIS — M76.829 PTTD (POSTERIOR TIBIAL TENDON DYSFUNCTION): ICD-10-CM

## 2023-09-14 DIAGNOSIS — M92.61 HAGLUND'S DEFORMITY OF RIGHT HEEL: ICD-10-CM

## 2023-09-14 DIAGNOSIS — M79.672 LEFT FOOT PAIN: Primary | ICD-10-CM

## 2023-09-14 DIAGNOSIS — M25.572 CHRONIC PAIN OF BOTH ANKLES: ICD-10-CM

## 2023-09-14 DIAGNOSIS — M65.28 CALCIFIC ACHILLES TENDINITIS: ICD-10-CM

## 2023-09-14 DIAGNOSIS — M79.671 RIGHT FOOT PAIN: ICD-10-CM

## 2023-09-15 RX ORDER — DEXAMETHASONE SODIUM PHOSPHATE 4 MG/ML
INJECTION, SOLUTION INTRA-ARTICULAR; INTRALESIONAL; INTRAMUSCULAR; INTRAVENOUS; SOFT TISSUE
Qty: 30 ML | Refills: 0 | Status: SHIPPED | OUTPATIENT
Start: 2023-09-15

## 2023-09-16 PROBLEM — M25.571 CHRONIC PAIN OF BOTH ANKLES: Status: ACTIVE | Noted: 2023-09-16

## 2023-09-16 PROBLEM — M65.28 CALCIFIC ACHILLES TENDINITIS: Status: ACTIVE | Noted: 2023-09-16

## 2023-09-16 PROBLEM — G89.29 CHRONIC PAIN OF BOTH ANKLES: Status: ACTIVE | Noted: 2023-09-16

## 2023-09-16 PROBLEM — M92.61 HAGLUND'S DEFORMITY OF RIGHT HEEL: Status: ACTIVE | Noted: 2023-09-16

## 2023-09-16 PROBLEM — M76.829 PTTD (POSTERIOR TIBIAL TENDON DYSFUNCTION): Status: ACTIVE | Noted: 2023-09-16

## 2023-09-16 PROBLEM — M25.572 CHRONIC PAIN OF BOTH ANKLES: Status: ACTIVE | Noted: 2023-09-16

## 2023-09-16 PROBLEM — M76.60 CALCIFIC ACHILLES TENDINITIS: Status: ACTIVE | Noted: 2023-09-16

## 2023-10-02 ENCOUNTER — HOSPITAL ENCOUNTER (OUTPATIENT)
Dept: WOMENS IMAGING | Age: 58
Discharge: HOME | End: 2023-10-02
Attending: OBSTETRICS & GYNECOLOGY
Payer: COMMERCIAL

## 2023-10-02 VITALS — HEIGHT: 65 IN | BODY MASS INDEX: 41.65 KG/M2 | WEIGHT: 250 LBS

## 2023-10-02 DIAGNOSIS — Z12.31 ENCOUNTER FOR SCREENING MAMMOGRAM FOR MALIGNANT NEOPLASM OF BREAST: ICD-10-CM

## 2023-10-02 PROCEDURE — 77063 BREAST TOMOSYNTHESIS BI: CPT

## 2023-10-03 ENCOUNTER — OFFICE VISIT (OUTPATIENT)
Dept: ORTHOPEDIC SURGERY | Age: 58
End: 2023-10-03
Payer: COMMERCIAL

## 2023-10-03 VITALS — WEIGHT: 250 LBS | BODY MASS INDEX: 41.65 KG/M2 | HEIGHT: 65 IN

## 2023-10-03 DIAGNOSIS — R20.2 PARESTHESIA OF LEFT LOWER EXTREMITY: ICD-10-CM

## 2023-10-03 DIAGNOSIS — M50.30 DDD (DEGENERATIVE DISC DISEASE), CERVICAL: ICD-10-CM

## 2023-10-03 DIAGNOSIS — M47.812 CERVICAL SPONDYLOSIS: ICD-10-CM

## 2023-10-03 DIAGNOSIS — M47.816 LUMBAR FACET ARTHROPATHY: ICD-10-CM

## 2023-10-03 DIAGNOSIS — E04.9 THYROID GOITER: ICD-10-CM

## 2023-10-03 DIAGNOSIS — M50.21 OTHER CERVICAL DISC DISPLACEMENT, HIGH CERVICAL REGION: ICD-10-CM

## 2023-10-03 PROCEDURE — G8417 CALC BMI ABV UP PARAM F/U: HCPCS | Performed by: INTERNAL MEDICINE

## 2023-10-03 PROCEDURE — 99214 OFFICE O/P EST MOD 30 MIN: CPT | Performed by: INTERNAL MEDICINE

## 2023-10-03 PROCEDURE — G8427 DOCREV CUR MEDS BY ELIG CLIN: HCPCS | Performed by: INTERNAL MEDICINE

## 2023-10-03 PROCEDURE — G8484 FLU IMMUNIZE NO ADMIN: HCPCS | Performed by: INTERNAL MEDICINE

## 2023-10-03 PROCEDURE — 3017F COLORECTAL CA SCREEN DOC REV: CPT | Performed by: INTERNAL MEDICINE

## 2023-10-03 PROCEDURE — 1036F TOBACCO NON-USER: CPT | Performed by: INTERNAL MEDICINE

## 2023-10-03 RX ORDER — TRAMADOL HYDROCHLORIDE 50 MG/1
50 TABLET ORAL EVERY 6 HOURS PRN
Qty: 20 TABLET | Refills: 0 | Status: SHIPPED | OUTPATIENT
Start: 2023-10-03 | End: 2023-10-08

## 2023-10-03 NOTE — PROGRESS NOTES
narcotics       Orders:        Orders Placed This Encounter   Procedures    T3     Standing Status:   Future     Standing Expiration Date:   10/3/2024    T4     Standing Status:   Future     Standing Expiration Date:   10/3/2024    TSH     Standing Status:   Future     Standing Expiration Date:   10/3/2024    [facility]     Referral Priority:   Routine     Referral Type:   Eval and Treat     Referral Reason:   Specialty Services Required     Requested Specialty:   Endocrinology     Number of Visits Requested:   1         Xiomara Dee MD.      Disclaimer: \"This note was dictated with voice recognition software. Though review and correction are routine, we apologize for any errors. \"

## 2023-10-04 ENCOUNTER — HOSPITAL ENCOUNTER (OUTPATIENT)
Age: 58
Discharge: HOME | End: 2023-10-04
Payer: COMMERCIAL

## 2023-10-04 DIAGNOSIS — E04.9 THYROID GOITER: ICD-10-CM

## 2023-10-04 LAB
T3 SERPL-MCNC: 0.93 NG/ML (ref 0.8–2)
T4 SERPL-MCNC: 7.2 UG/DL (ref 4.5–10.9)
TSH SERPL DL<=0.005 MIU/L-ACNC: 2.76 UIU/ML (ref 0.27–4.2)

## 2023-10-04 PROCEDURE — 36415 COLL VENOUS BLD VENIPUNCTURE: CPT

## 2023-10-04 PROCEDURE — 84443 ASSAY THYROID STIM HORMONE: CPT

## 2023-10-04 PROCEDURE — 84436 ASSAY OF TOTAL THYROXINE: CPT

## 2023-10-04 PROCEDURE — 84480 ASSAY TRIIODOTHYRONINE (T3): CPT

## 2023-10-05 NOTE — RESULT ENCOUNTER NOTE
Lab TR are marked as seen by the pt.   Sent [employer]t message to confirm TR are normal and for pt to proceed with endo referral.

## 2023-10-12 ENCOUNTER — OFFICE VISIT (OUTPATIENT)
Dept: ENDOCRINOLOGY | Age: 58
End: 2023-10-12
Payer: COMMERCIAL

## 2023-10-12 ENCOUNTER — OFFICE VISIT (OUTPATIENT)
Dept: ENT CLINIC | Age: 58
End: 2023-10-12
Payer: COMMERCIAL

## 2023-10-12 VITALS
DIASTOLIC BLOOD PRESSURE: 85 MMHG | HEIGHT: 65 IN | HEART RATE: 68 BPM | WEIGHT: 259.6 LBS | BODY MASS INDEX: 43.25 KG/M2 | SYSTOLIC BLOOD PRESSURE: 132 MMHG | RESPIRATION RATE: 16 BRPM

## 2023-10-12 VITALS
TEMPERATURE: 97.5 F | HEART RATE: 71 BPM | SYSTOLIC BLOOD PRESSURE: 124 MMHG | DIASTOLIC BLOOD PRESSURE: 81 MMHG | OXYGEN SATURATION: 94 % | WEIGHT: 259 LBS | HEIGHT: 65 IN | BODY MASS INDEX: 43.15 KG/M2

## 2023-10-12 DIAGNOSIS — H91.93 BILATERAL HEARING LOSS, UNSPECIFIED HEARING LOSS TYPE: ICD-10-CM

## 2023-10-12 DIAGNOSIS — K21.9 GASTROESOPHAGEAL REFLUX DISEASE, UNSPECIFIED WHETHER ESOPHAGITIS PRESENT: ICD-10-CM

## 2023-10-12 DIAGNOSIS — H81.01 MENIERE'S DISEASE (COCHLEAR HYDROPS), RIGHT: ICD-10-CM

## 2023-10-12 DIAGNOSIS — H93.11 TINNITUS OF RIGHT EAR: ICD-10-CM

## 2023-10-12 DIAGNOSIS — R13.10 DYSPHAGIA, UNSPECIFIED TYPE: ICD-10-CM

## 2023-10-12 DIAGNOSIS — E04.9 GOITER: Primary | ICD-10-CM

## 2023-10-12 DIAGNOSIS — R49.0 DYSPHONIA: ICD-10-CM

## 2023-10-12 DIAGNOSIS — E04.1 THYROID NODULE: Primary | ICD-10-CM

## 2023-10-12 DIAGNOSIS — R53.82 CHRONIC FATIGUE: ICD-10-CM

## 2023-10-12 DIAGNOSIS — D10.39 SQUAMOUS PAPILLOMA OF SOFT PALATE: ICD-10-CM

## 2023-10-12 PROCEDURE — 31575 DIAGNOSTIC LARYNGOSCOPY: CPT | Performed by: STUDENT IN AN ORGANIZED HEALTH CARE EDUCATION/TRAINING PROGRAM

## 2023-10-12 PROCEDURE — G8484 FLU IMMUNIZE NO ADMIN: HCPCS | Performed by: STUDENT IN AN ORGANIZED HEALTH CARE EDUCATION/TRAINING PROGRAM

## 2023-10-12 PROCEDURE — 1036F TOBACCO NON-USER: CPT | Performed by: STUDENT IN AN ORGANIZED HEALTH CARE EDUCATION/TRAINING PROGRAM

## 2023-10-12 PROCEDURE — G8417 CALC BMI ABV UP PARAM F/U: HCPCS | Performed by: STUDENT IN AN ORGANIZED HEALTH CARE EDUCATION/TRAINING PROGRAM

## 2023-10-12 PROCEDURE — 3074F SYST BP LT 130 MM HG: CPT | Performed by: STUDENT IN AN ORGANIZED HEALTH CARE EDUCATION/TRAINING PROGRAM

## 2023-10-12 PROCEDURE — 99204 OFFICE O/P NEW MOD 45 MIN: CPT | Performed by: INTERNAL MEDICINE

## 2023-10-12 PROCEDURE — 3017F COLORECTAL CA SCREEN DOC REV: CPT | Performed by: STUDENT IN AN ORGANIZED HEALTH CARE EDUCATION/TRAINING PROGRAM

## 2023-10-12 PROCEDURE — 99204 OFFICE O/P NEW MOD 45 MIN: CPT | Performed by: STUDENT IN AN ORGANIZED HEALTH CARE EDUCATION/TRAINING PROGRAM

## 2023-10-12 PROCEDURE — G8427 DOCREV CUR MEDS BY ELIG CLIN: HCPCS | Performed by: STUDENT IN AN ORGANIZED HEALTH CARE EDUCATION/TRAINING PROGRAM

## 2023-10-12 PROCEDURE — 3079F DIAST BP 80-89 MM HG: CPT | Performed by: INTERNAL MEDICINE

## 2023-10-12 PROCEDURE — 3075F SYST BP GE 130 - 139MM HG: CPT | Performed by: INTERNAL MEDICINE

## 2023-10-12 PROCEDURE — 3079F DIAST BP 80-89 MM HG: CPT | Performed by: STUDENT IN AN ORGANIZED HEALTH CARE EDUCATION/TRAINING PROGRAM

## 2023-10-12 RX ORDER — TRAMADOL HYDROCHLORIDE 50 MG/1
50 TABLET ORAL
COMMUNITY
Start: 2023-08-31

## 2023-10-12 RX ORDER — TESTOSTERONE CYPIONATE 200 MG/ML
INJECTION INTRAMUSCULAR
COMMUNITY
Start: 2023-09-18

## 2023-10-12 NOTE — PROGRESS NOTES
levoscoliosis. Heterogeneous multinodular thyroid. Consider thyroid function testing and scintigraghy with uptake    CONCLUSION:  1. C5-C6 mild central canal and right neural foraminal narrowing secondary to uncovertebral joint hypertrophy and broad-based spondylotic disc protrusion with abutment of the ventral cord. 2. C4-C5 broad-based spondylotic disc protrusion with abutment of the ventral cord resulting in mild central canal stenosis. 3. C3-C4 central protrusion type disc herniation with effacement of the ventral cord resulting in mild central canal stenosis. Posterior annular fissure. 4. C2-C3 shallow noncompressive central disc protrusion with small posterior annular fissure. Thank you for the opportunity to provide your interpretation. Lab Results   Component Value Date    TSH 2.76 10/04/2023    V4PIQVR 0.93 10/04/2023    M4VOKMI 7.2 10/04/2023       Audiology ([address] [place] [address]) 4/18/2022:  Chacho Dueñas - 04/18/2022 1:30 PM EDT  Formatting of this note might be different from the original.  Audiogram ordered by Monster Garnica     Patient has a longstanding history of a bilateral sensorineural hearing loss and bilateral meniere's disease. Currently wearing hearing aids. Feels as though her hearing has decreased and vertigo episodes have worsened recently. She has also been having trouble with inserting her hearing aids. Audio results -   Pure tone thresholds (125-8k Hz) moderately severe sensorineural hearing loss (with a slight conductive component at 250 Hz) in the right ear  Pure tone thresholds (125-8k Hz) mild to moderately severe sensorineural hearing loss in the left ear  Very poor word recognition score (36% recorded) and poor word recognition score(68% live voice) in quiet on the right; Excellent word recognition score (96%) in quiet on the left  Tympanometry shows normal middle ear pressure and compliance (Type A) both ears    Assessment and Plan     1.  Thyroid

## 2023-10-12 NOTE — PROGRESS NOTES
recommend diet and exercise efforts. May consider elimination diet and intermittent fasting. Continue on vitamin D supplement and ensure good hydration. Return if symptoms worsen or fail to improve. Electronically signed by Radha Simon MD on 10/12/2023 at 10:17 AM    Thank you very much for allowing me to take care of this patient along with you. Comment: This documentation utilized a software-based speech recognition technology (voice-to-text process), where occasional errors in transcription can occur.

## 2023-10-16 RX ORDER — AMLODIPINE BESYLATE 5 MG/1
5 TABLET ORAL DAILY
Qty: 90 TABLET | Refills: 3 | Status: SHIPPED | OUTPATIENT
Start: 2023-10-16

## 2023-10-16 RX ORDER — RANOLAZINE 500 MG/1
500 TABLET, EXTENDED RELEASE ORAL 2 TIMES DAILY
Qty: 180 TABLET | Refills: 3 | Status: SHIPPED | OUTPATIENT
Start: 2023-10-16

## 2023-10-17 ENCOUNTER — TELEPHONE (OUTPATIENT)
Dept: ENT CLINIC | Age: 58
End: 2023-10-17

## 2023-10-17 ENCOUNTER — HOSPITAL ENCOUNTER (OUTPATIENT)
Dept: ULTRASOUND IMAGING | Age: 58
Discharge: HOME | End: 2023-10-17
Attending: STUDENT IN AN ORGANIZED HEALTH CARE EDUCATION/TRAINING PROGRAM
Payer: COMMERCIAL

## 2023-10-17 DIAGNOSIS — E04.1 THYROID NODULE: ICD-10-CM

## 2023-10-17 PROCEDURE — 76536 US EXAM OF HEAD AND NECK: CPT

## 2023-10-17 NOTE — TELEPHONE ENCOUNTER
Pt.stated she was suppose to have an order for Lab work from Dr. Caty Horvath but she said there is not one she thought he wanted her to get blood work done. She stated she is going to [facility] today for something else and would like to know does she need lab work done. She would like a call back.

## 2023-10-17 NOTE — TELEPHONE ENCOUNTER
Dr. Marge Jay, was there any blood work that you were wanting to order for this patient? Please advise. Thank you.

## 2023-11-02 ENCOUNTER — OFFICE VISIT (OUTPATIENT)
Dept: ENT CLINIC | Age: 58
End: 2023-11-02
Payer: COMMERCIAL

## 2023-11-02 ENCOUNTER — TELEPHONE (OUTPATIENT)
Dept: CARDIOLOGY CLINIC | Age: 58
End: 2023-11-02

## 2023-11-02 VITALS
BODY MASS INDEX: 43.15 KG/M2 | SYSTOLIC BLOOD PRESSURE: 140 MMHG | HEART RATE: 75 BPM | WEIGHT: 259 LBS | OXYGEN SATURATION: 97 % | TEMPERATURE: 97.7 F | DIASTOLIC BLOOD PRESSURE: 84 MMHG | HEIGHT: 65 IN

## 2023-11-02 DIAGNOSIS — E04.2 MULTINODULAR THYROID: ICD-10-CM

## 2023-11-02 DIAGNOSIS — H91.93 BILATERAL HEARING LOSS, UNSPECIFIED HEARING LOSS TYPE: ICD-10-CM

## 2023-11-02 DIAGNOSIS — H81.01 MENIERE'S DISEASE (COCHLEAR HYDROPS), RIGHT: ICD-10-CM

## 2023-11-02 DIAGNOSIS — R49.0 DYSPHONIA: ICD-10-CM

## 2023-11-02 DIAGNOSIS — H93.11 TINNITUS OF RIGHT EAR: ICD-10-CM

## 2023-11-02 DIAGNOSIS — D10.39 SQUAMOUS PAPILLOMA OF SOFT PALATE: Primary | ICD-10-CM

## 2023-11-02 PROCEDURE — 99214 OFFICE O/P EST MOD 30 MIN: CPT | Performed by: STUDENT IN AN ORGANIZED HEALTH CARE EDUCATION/TRAINING PROGRAM

## 2023-11-02 PROCEDURE — G8484 FLU IMMUNIZE NO ADMIN: HCPCS | Performed by: STUDENT IN AN ORGANIZED HEALTH CARE EDUCATION/TRAINING PROGRAM

## 2023-11-02 PROCEDURE — G8417 CALC BMI ABV UP PARAM F/U: HCPCS | Performed by: STUDENT IN AN ORGANIZED HEALTH CARE EDUCATION/TRAINING PROGRAM

## 2023-11-02 PROCEDURE — 1036F TOBACCO NON-USER: CPT | Performed by: STUDENT IN AN ORGANIZED HEALTH CARE EDUCATION/TRAINING PROGRAM

## 2023-11-02 PROCEDURE — G8427 DOCREV CUR MEDS BY ELIG CLIN: HCPCS | Performed by: STUDENT IN AN ORGANIZED HEALTH CARE EDUCATION/TRAINING PROGRAM

## 2023-11-02 PROCEDURE — 3017F COLORECTAL CA SCREEN DOC REV: CPT | Performed by: STUDENT IN AN ORGANIZED HEALTH CARE EDUCATION/TRAINING PROGRAM

## 2023-11-02 PROCEDURE — 3079F DIAST BP 80-89 MM HG: CPT | Performed by: STUDENT IN AN ORGANIZED HEALTH CARE EDUCATION/TRAINING PROGRAM

## 2023-11-02 PROCEDURE — 3077F SYST BP >= 140 MM HG: CPT | Performed by: STUDENT IN AN ORGANIZED HEALTH CARE EDUCATION/TRAINING PROGRAM

## 2023-11-02 NOTE — TELEPHONE ENCOUNTER
CARDIAC CLEARANCE     What type of procedure are you having? Squamous Papilloma of the Soft Palate     Which physician is performing your procedure? Dr. Call Home     When is your procedure scheduled for?  11/10    Where are you having this procedure? [facility]    Are you taking Blood Thinners? No   If so what? (Name/dose/frequesncy)     Does the surgeon want you to stop your blood thinner? If so for how long? N/A  Phone Number and Contact Name for Physicians office:  [phone]  Fax number to send information: [phone]    NOTE:  Pt is requesting a copy of the clearance to put upload in her [facility].   Thank you

## 2023-11-02 NOTE — TELEPHONE ENCOUNTER
Last OV: [facility] 6/14/23    Summary of Assessment and Plan:     Patient Instructions   BNP today     OK to take 1-3 nitroglycerin tablets if needed for angina              a. Be sure to sit down when taking it  b. Be sure to wait 5 minutes between doses                Stop Lisinopril     Begin amlodipine 2.5mg every day. Can titrate up slowly to 5mg & then 10mg. Begin Jardiance 10mg every day. Watch for signs of a bladder infection such as urinary frequency, small amounts, pain with voiding. Clean carefully after using the restroom, can use hygienic wet wipes     4. Dr. [facility] office will call you for an appointment. Return in about 6 months (around 12/14/2023).

## 2023-11-05 ENCOUNTER — PATIENT MESSAGE (OUTPATIENT)
Dept: CARDIOLOGY CLINIC | Age: 58
End: 2023-11-05

## 2023-11-07 RX ORDER — ACETAMINOPHEN 160 MG
1000 TABLET,DISINTEGRATING ORAL DAILY
COMMUNITY

## 2023-11-07 NOTE — PROGRESS NOTES
Name_______________________________________Printed:____________________  Date and time of surgery__11/10/23  1130______________________Arrival Time:_1000 [facility]_______________   1. The instructions given regarding when and if a patient needs to stop oral intake prior to surgery varies. Follow the specific instructions you were given                  __x_Nothing to eat or to drink after Midnight the night before.                   ____Carbo loading or instructions will be given to select patients-if you have been given those instructions -please do the following                           The evening before your surgery after dinner before midnight drink 40 ounces of gatorade. If you are diabetic use sugar free. The morning of surgery drink 40 ounces of water. This needs to be finished 3 hours prior to your surgery start time. 2. Take the following pills with a small sip of water on the morning of surgery_Ranexa, zoloft, omeprazole, amlodipine__________________________________________________                  Do not take blood pressure medications ending in pril or sartan the bhaskar prior to surgery or the morning of surgery. Dr Spencer Cortes patient are not to take any medications the AM of surgery. 3. Aspirin, Ibuprofen, Advil, Naproxen, Vitamin E and other Anti-inflammatory products and supplements should be stopped for 5 -7days before surgery or as directed by your physician. 4. Check with your Doctor regarding stopping Plavix, Coumadin,Eliquis, Lovenox,Effient,Pradaxa,Xarelto, Fragmin or other blood thinners and follow their instructions. 5. Do not smoke, and do not drink any alcoholic beverages 24 hours prior to surgery. This includes NA Beer. Refrain from the usage of any recreational drugs. 6. You may brush your teeth and gargle the morning of surgery. DO NOT SWALLOW WATER   7. You MUST make arrangements for a responsible adult to stay on site while you are here and take you home after your surgery.  You 18.  Please bring picture ID and insurance card. 19.  Visit our web site for additional information:  [email or web address]              20.During flu season no children under the age of 15 are permitted in the hospital for the safety of all patients. 21. If you take a long acting insulin in the evening only  take half of your usual  dose the night  before your procedure              22. If you use a c-pap please bring DOS if staying overnight,             23.For your convenience [facility] has a pharmacy on site to fill your prescriptions. 24. If you use oxygen and have a portable tank please bring it  with you the DOS             25. Bring a complete list of all your medications with name and dose include any supplements. 26. Other__________________________________________   *Please call pre admission testing if you any further questions   Roma Ralph         [facility]  [address] [place] [address]   [address] [place] [address]       VISITOR POLICY(subject to change)    Current policy is 2 visitors per patient. No children. Mask is  at the discretion of the facility. Visiting hours are 8a-8p. Overnight visitors will be at the discretion of the nurse. All policies subject to change. All above information reviewed with patient in person or by phone. Patient verbalizes understanding. All questions and concerns addressed.                                                                                                  Patient/Rep___patient_________________                                                                                                                                    PRE OP INSTRUCTIONS

## 2023-11-07 NOTE — TELEPHONE ENCOUNTER
Letter composed per [facility] message, faxed over to office with successful transmission received back. Notified patient via [employer] as well as a phone call conversation with her. Pt verbalized understanding and has no further questions at this time.

## 2023-11-09 ENCOUNTER — OFFICE VISIT (OUTPATIENT)
Dept: ORTHOPEDIC SURGERY | Age: 58
End: 2023-11-09
Payer: COMMERCIAL

## 2023-11-09 VITALS — BODY MASS INDEX: 43.15 KG/M2 | WEIGHT: 259 LBS | HEIGHT: 65 IN

## 2023-11-09 DIAGNOSIS — M92.61 HAGLUND'S DEFORMITY OF RIGHT HEEL: Primary | ICD-10-CM

## 2023-11-09 DIAGNOSIS — M65.28 CALCIFIC ACHILLES TENDINITIS OF RIGHT LOWER EXTREMITY: ICD-10-CM

## 2023-11-09 PROBLEM — M76.61 CALCIFIC ACHILLES TENDINITIS OF RIGHT LOWER EXTREMITY: Status: ACTIVE | Noted: 2023-09-16

## 2023-11-09 PROCEDURE — G8484 FLU IMMUNIZE NO ADMIN: HCPCS | Performed by: ORTHOPAEDIC SURGERY

## 2023-11-09 PROCEDURE — 1036F TOBACCO NON-USER: CPT | Performed by: ORTHOPAEDIC SURGERY

## 2023-11-09 PROCEDURE — G8417 CALC BMI ABV UP PARAM F/U: HCPCS | Performed by: ORTHOPAEDIC SURGERY

## 2023-11-09 PROCEDURE — G8427 DOCREV CUR MEDS BY ELIG CLIN: HCPCS | Performed by: ORTHOPAEDIC SURGERY

## 2023-11-09 PROCEDURE — 99214 OFFICE O/P EST MOD 30 MIN: CPT | Performed by: ORTHOPAEDIC SURGERY

## 2023-11-09 PROCEDURE — 3017F COLORECTAL CA SCREEN DOC REV: CPT | Performed by: ORTHOPAEDIC SURGERY

## 2023-11-09 NOTE — PROGRESS NOTES
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 2- Left medial midfoot pain, insertional PTTD    HISTORY:  Ms. Jose Ross 60 y.o.  female presents today for tf/u evaluation of bilateral posterior heel pain and medial left midfoot pain which started years ago. She had left Achillis and heel surgery over 30 years ago. She is complaining of sharp pain, 6/10. Pain is increase with standing and walking and shoe wear. Pain is sharp early in the morning with first few steps, dull achy pain by the end of the day. No radiation and no numbness and tingling sensation. No other complaint. She did PT with minimal improvement.     Past Medical History:   Diagnosis Date    Asthma     resolved, no meds    Depression     H/O drug overdose 2002    suicide attempt, cardiac arrest    Headache(784.0)     Hyperlipemia     Hypertension     Meniere's disease     Migraines     PONV (postoperative nausea and vomiting)     Sleep apnea     cpap, does not use    Thyroid nodule     Wears hearing aid     bilateral hearing aids       Past Surgical History:   Procedure Laterality Date    BREAST BIOPSY      CARPAL TUNNEL RELEASE Bilateral     CERVIX REMOVAL  12/08/2016    CHOLECYSTECTOMY  2008    COLONOSCOPY      DEBRIDEMENT TENNIS ELBOW Bilateral     DEBRIDEMENT TENNIS ELBOW      ELBOW SURGERY Bilateral     tennis elbow    ENDOMETRIAL ABLATION  1999    ESOPHAGOGASTRODUODENOSCOPY      FOOT SURGERY Left 2002    2 screws , anchor    HYSTERECTOMY (CERVIX STATUS UNKNOWN)  2009    laparoscopic supracervical hyst for fibroids    JOINT REPLACEMENT      LAP BAND  2008    removal 2011    OTHER SURGICAL HISTORY Bilateral     Menier's surgery    SHOULDER ARTHROSCOPY Right 11/18/2022    RIGHT SHOULDER ARTHROSCOPY; SUBACROMIAL DECOMPRESSION, ROTATOR CUFF TENDON REPAIR - BLOCK, EXCISION OF RIGHT UPPER ARM/SHOULDER performed by Tonia Chavez MD at [facility]

## 2023-11-10 ENCOUNTER — ANESTHESIA EVENT (OUTPATIENT)
Dept: OPERATING ROOM | Age: 58
End: 2023-11-10
Payer: COMMERCIAL

## 2023-11-10 ENCOUNTER — HOSPITAL ENCOUNTER (OUTPATIENT)
Age: 58
Setting detail: OUTPATIENT SURGERY
Discharge: HOME | End: 2023-11-10
Attending: STUDENT IN AN ORGANIZED HEALTH CARE EDUCATION/TRAINING PROGRAM | Admitting: STUDENT IN AN ORGANIZED HEALTH CARE EDUCATION/TRAINING PROGRAM
Payer: COMMERCIAL

## 2023-11-10 ENCOUNTER — APPOINTMENT (OUTPATIENT)
Dept: GENERAL RADIOLOGY | Age: 58
End: 2023-11-10
Attending: STUDENT IN AN ORGANIZED HEALTH CARE EDUCATION/TRAINING PROGRAM
Payer: COMMERCIAL

## 2023-11-10 ENCOUNTER — ANESTHESIA (OUTPATIENT)
Dept: OPERATING ROOM | Age: 58
End: 2023-11-10
Payer: COMMERCIAL

## 2023-11-10 ENCOUNTER — TELEPHONE (OUTPATIENT)
Dept: ORTHOPEDIC SURGERY | Age: 58
End: 2023-11-10

## 2023-11-10 VITALS
TEMPERATURE: 97.5 F | BODY MASS INDEX: 43.65 KG/M2 | HEIGHT: 65 IN | DIASTOLIC BLOOD PRESSURE: 103 MMHG | RESPIRATION RATE: 19 BRPM | OXYGEN SATURATION: 95 % | WEIGHT: 262 LBS | HEART RATE: 79 BPM | SYSTOLIC BLOOD PRESSURE: 138 MMHG

## 2023-11-10 DIAGNOSIS — D10.39 SQUAMOUS PAPILLOMA OF SOFT PALATE: ICD-10-CM

## 2023-11-10 DIAGNOSIS — G89.18 ACUTE POST-OPERATIVE PAIN: Primary | ICD-10-CM

## 2023-11-10 LAB
ANION GAP SERPL CALCULATED.3IONS-SCNC: 11 MMOL/L (ref 3–16)
BUN SERPL-MCNC: 13 MG/DL (ref 7–20)
CALCIUM SERPL-MCNC: 9.1 MG/DL (ref 8.3–10.6)
CHLORIDE SERPL-SCNC: 100 MMOL/L (ref 99–110)
CO2 SERPL-SCNC: 27 MMOL/L (ref 21–32)
CREAT SERPL-MCNC: 0.5 MG/DL (ref 0.6–1.1)
EKG ATRIAL RATE: 71 BPM
EKG DIAGNOSIS: NORMAL
EKG P AXIS: 34 DEGREES
EKG P-R INTERVAL: 182 MS
EKG Q-T INTERVAL: 448 MS
EKG QRS DURATION: 82 MS
EKG QTC CALCULATION (BAZETT): 486 MS
EKG R AXIS: -8 DEGREES
EKG T AXIS: -5 DEGREES
EKG VENTRICULAR RATE: 71 BPM
GFR SERPLBLD CREATININE-BSD FMLA CKD-EPI: >60 ML/MIN/{1.73_M2}
GLUCOSE BLD-MCNC: 100 MG/DL (ref 70–99)
GLUCOSE SERPL-MCNC: 104 MG/DL (ref 70–99)
PERFORMED ON: ABNORMAL
POTASSIUM SERPL-SCNC: 5.3 MMOL/L (ref 3.5–5.1)
REASON FOR REJECTION: NORMAL
REJECTED TEST: NORMAL
SODIUM SERPL-SCNC: 138 MMOL/L (ref 136–145)
TROPONIN, HIGH SENSITIVITY: <6 NG/L (ref 0–14)

## 2023-11-10 PROCEDURE — 2580000003 HC RX 258: Performed by: ANESTHESIOLOGY

## 2023-11-10 PROCEDURE — 2709999900 HC NON-CHARGEABLE SUPPLY: Performed by: STUDENT IN AN ORGANIZED HEALTH CARE EDUCATION/TRAINING PROGRAM

## 2023-11-10 PROCEDURE — 42106 EXCISION LESION MOUTH ROOF: CPT | Performed by: STUDENT IN AN ORGANIZED HEALTH CARE EDUCATION/TRAINING PROGRAM

## 2023-11-10 PROCEDURE — 6360000002 HC RX W HCPCS: Performed by: ANESTHESIOLOGY

## 2023-11-10 PROCEDURE — 3600000003 HC SURGERY LEVEL 3 BASE: Performed by: STUDENT IN AN ORGANIZED HEALTH CARE EDUCATION/TRAINING PROGRAM

## 2023-11-10 PROCEDURE — 3600000013 HC SURGERY LEVEL 3 ADDTL 15MIN: Performed by: STUDENT IN AN ORGANIZED HEALTH CARE EDUCATION/TRAINING PROGRAM

## 2023-11-10 PROCEDURE — 3700000000 HC ANESTHESIA ATTENDED CARE: Performed by: STUDENT IN AN ORGANIZED HEALTH CARE EDUCATION/TRAINING PROGRAM

## 2023-11-10 PROCEDURE — 84484 ASSAY OF TROPONIN QUANT: CPT

## 2023-11-10 PROCEDURE — 2580000003 HC RX 258: Performed by: STUDENT IN AN ORGANIZED HEALTH CARE EDUCATION/TRAINING PROGRAM

## 2023-11-10 PROCEDURE — 7100000001 HC PACU RECOVERY - ADDTL 15 MIN: Performed by: STUDENT IN AN ORGANIZED HEALTH CARE EDUCATION/TRAINING PROGRAM

## 2023-11-10 PROCEDURE — 88305 TISSUE EXAM BY PATHOLOGIST: CPT

## 2023-11-10 PROCEDURE — 3700000001 HC ADD 15 MINUTES (ANESTHESIA): Performed by: STUDENT IN AN ORGANIZED HEALTH CARE EDUCATION/TRAINING PROGRAM

## 2023-11-10 PROCEDURE — 2500000003 HC RX 250 WO HCPCS: Performed by: STUDENT IN AN ORGANIZED HEALTH CARE EDUCATION/TRAINING PROGRAM

## 2023-11-10 PROCEDURE — 80048 BASIC METABOLIC PNL TOTAL CA: CPT

## 2023-11-10 PROCEDURE — 6360000002 HC RX W HCPCS: Performed by: NURSE ANESTHETIST, CERTIFIED REGISTERED

## 2023-11-10 PROCEDURE — 2500000003 HC RX 250 WO HCPCS: Performed by: ANESTHESIOLOGY

## 2023-11-10 PROCEDURE — 7100000010 HC PHASE II RECOVERY - FIRST 15 MIN: Performed by: STUDENT IN AN ORGANIZED HEALTH CARE EDUCATION/TRAINING PROGRAM

## 2023-11-10 PROCEDURE — 7100000000 HC PACU RECOVERY - FIRST 15 MIN: Performed by: STUDENT IN AN ORGANIZED HEALTH CARE EDUCATION/TRAINING PROGRAM

## 2023-11-10 PROCEDURE — 7100000011 HC PHASE II RECOVERY - ADDTL 15 MIN: Performed by: STUDENT IN AN ORGANIZED HEALTH CARE EDUCATION/TRAINING PROGRAM

## 2023-11-10 PROCEDURE — A4216 STERILE WATER/SALINE, 10 ML: HCPCS | Performed by: ANESTHESIOLOGY

## 2023-11-10 PROCEDURE — 36415 COLL VENOUS BLD VENIPUNCTURE: CPT

## 2023-11-10 PROCEDURE — 2500000003 HC RX 250 WO HCPCS: Performed by: NURSE ANESTHETIST, CERTIFIED REGISTERED

## 2023-11-10 PROCEDURE — 71045 X-RAY EXAM CHEST 1 VIEW: CPT

## 2023-11-10 RX ORDER — ROCURONIUM BROMIDE 10 MG/ML
INJECTION, SOLUTION INTRAVENOUS PRN
Status: DISCONTINUED | OUTPATIENT
Start: 2023-11-10 | End: 2023-11-10 | Stop reason: SDUPTHER

## 2023-11-10 RX ORDER — ONDANSETRON 2 MG/ML
4 INJECTION INTRAMUSCULAR; INTRAVENOUS
Status: DISCONTINUED | OUTPATIENT
Start: 2023-11-10 | End: 2023-11-10 | Stop reason: HOSPADM

## 2023-11-10 RX ORDER — APREPITANT 40 MG/1
40 CAPSULE ORAL ONCE
Status: COMPLETED | OUTPATIENT
Start: 2023-11-10 | End: 2023-11-10

## 2023-11-10 RX ORDER — FENTANYL CITRATE 50 UG/ML
INJECTION, SOLUTION INTRAMUSCULAR; INTRAVENOUS PRN
Status: DISCONTINUED | OUTPATIENT
Start: 2023-11-10 | End: 2023-11-10 | Stop reason: SDUPTHER

## 2023-11-10 RX ORDER — OXYCODONE HYDROCHLORIDE 5 MG/1
5 TABLET ORAL
Status: DISCONTINUED | OUTPATIENT
Start: 2023-11-10 | End: 2023-11-10 | Stop reason: HOSPADM

## 2023-11-10 RX ORDER — LIDOCAINE HYDROCHLORIDE 20 MG/ML
INJECTION, SOLUTION EPIDURAL; INFILTRATION; INTRACAUDAL; PERINEURAL PRN
Status: DISCONTINUED | OUTPATIENT
Start: 2023-11-10 | End: 2023-11-10 | Stop reason: SDUPTHER

## 2023-11-10 RX ORDER — SODIUM CHLORIDE 9 MG/ML
INJECTION, SOLUTION INTRAVENOUS CONTINUOUS
Status: DISCONTINUED | OUTPATIENT
Start: 2023-11-10 | End: 2023-11-10 | Stop reason: HOSPADM

## 2023-11-10 RX ORDER — ONDANSETRON HYDROCHLORIDE 8 MG/1
8 TABLET, FILM COATED ORAL EVERY 8 HOURS PRN
Qty: 10 TABLET | Refills: 0 | Status: SHIPPED | OUTPATIENT
Start: 2023-11-10

## 2023-11-10 RX ORDER — ONDANSETRON 2 MG/ML
INJECTION INTRAMUSCULAR; INTRAVENOUS PRN
Status: DISCONTINUED | OUTPATIENT
Start: 2023-11-10 | End: 2023-11-10 | Stop reason: SDUPTHER

## 2023-11-10 RX ORDER — PROPOFOL 10 MG/ML
INJECTION, EMULSION INTRAVENOUS PRN
Status: DISCONTINUED | OUTPATIENT
Start: 2023-11-10 | End: 2023-11-10 | Stop reason: SDUPTHER

## 2023-11-10 RX ORDER — MIDAZOLAM HYDROCHLORIDE 1 MG/ML
INJECTION INTRAMUSCULAR; INTRAVENOUS PRN
Status: DISCONTINUED | OUTPATIENT
Start: 2023-11-10 | End: 2023-11-10 | Stop reason: SDUPTHER

## 2023-11-10 RX ORDER — SUCCINYLCHOLINE CHLORIDE 20 MG/ML
INJECTION INTRAMUSCULAR; INTRAVENOUS PRN
Status: DISCONTINUED | OUTPATIENT
Start: 2023-11-10 | End: 2023-11-10 | Stop reason: SDUPTHER

## 2023-11-10 RX ORDER — HYDROMORPHONE HYDROCHLORIDE 2 MG/ML
0.5 INJECTION, SOLUTION INTRAMUSCULAR; INTRAVENOUS; SUBCUTANEOUS EVERY 5 MIN PRN
Status: DISCONTINUED | OUTPATIENT
Start: 2023-11-10 | End: 2023-11-10 | Stop reason: HOSPADM

## 2023-11-10 RX ORDER — MEPERIDINE HYDROCHLORIDE 25 MG/ML
12.5 INJECTION INTRAMUSCULAR; INTRAVENOUS; SUBCUTANEOUS EVERY 5 MIN PRN
Status: DISCONTINUED | OUTPATIENT
Start: 2023-11-10 | End: 2023-11-10 | Stop reason: HOSPADM

## 2023-11-10 RX ORDER — HYDRALAZINE HYDROCHLORIDE 20 MG/ML
10 INJECTION INTRAMUSCULAR; INTRAVENOUS
Status: DISCONTINUED | OUTPATIENT
Start: 2023-11-10 | End: 2023-11-10 | Stop reason: HOSPADM

## 2023-11-10 RX ORDER — ACETAMINOPHEN AND CODEINE PHOSPHATE 300; 30 MG/1; MG/1
1 TABLET ORAL EVERY 8 HOURS PRN
Qty: 6 TABLET | Refills: 0 | Status: SHIPPED | OUTPATIENT
Start: 2023-11-10 | End: 2023-11-12

## 2023-11-10 RX ORDER — LABETALOL HYDROCHLORIDE 5 MG/ML
10 INJECTION, SOLUTION INTRAVENOUS
Status: DISCONTINUED | OUTPATIENT
Start: 2023-11-10 | End: 2023-11-10 | Stop reason: HOSPADM

## 2023-11-10 RX ORDER — DEXAMETHASONE SODIUM PHOSPHATE 10 MG/ML
INJECTION, SOLUTION INTRAMUSCULAR; INTRAVENOUS PRN
Status: DISCONTINUED | OUTPATIENT
Start: 2023-11-10 | End: 2023-11-10 | Stop reason: SDUPTHER

## 2023-11-10 RX ADMIN — APREPITANT 40 MG: 40 CAPSULE ORAL at 10:28

## 2023-11-10 RX ADMIN — ROCURONIUM BROMIDE 10 MG: 10 INJECTION, SOLUTION INTRAVENOUS at 11:25

## 2023-11-10 RX ADMIN — ONDANSETRON 4 MG: 2 INJECTION INTRAMUSCULAR; INTRAVENOUS at 11:32

## 2023-11-10 RX ADMIN — SUGAMMADEX 100 MG: 100 INJECTION, SOLUTION INTRAVENOUS at 11:45

## 2023-11-10 RX ADMIN — FAMOTIDINE 20 MG: 10 INJECTION, SOLUTION INTRAVENOUS at 12:31

## 2023-11-10 RX ADMIN — PROPOFOL 160 MG: 10 INJECTION, EMULSION INTRAVENOUS at 11:24

## 2023-11-10 RX ADMIN — LIDOCAINE HYDROCHLORIDE 50 MG: 20 INJECTION, SOLUTION EPIDURAL; INFILTRATION; INTRACAUDAL; PERINEURAL at 11:24

## 2023-11-10 RX ADMIN — PROPOFOL 140 MCG/KG/MIN: 10 INJECTION, EMULSION INTRAVENOUS at 11:26

## 2023-11-10 RX ADMIN — FENTANYL CITRATE 100 MCG: 50 INJECTION, SOLUTION INTRAMUSCULAR; INTRAVENOUS at 11:23

## 2023-11-10 RX ADMIN — SUCCINYLCHOLINE CHLORIDE 120 MG: 20 INJECTION, SOLUTION INTRAMUSCULAR; INTRAVENOUS at 11:25

## 2023-11-10 RX ADMIN — DEXAMETHASONE SODIUM PHOSPHATE 10 MG: 10 INJECTION, SOLUTION INTRAMUSCULAR; INTRAVENOUS at 11:32

## 2023-11-10 RX ADMIN — SODIUM CHLORIDE: 9 INJECTION, SOLUTION INTRAVENOUS at 10:37

## 2023-11-10 RX ADMIN — MIDAZOLAM 2 MG: 1 INJECTION INTRAMUSCULAR; INTRAVENOUS at 11:22

## 2023-11-10 ASSESSMENT — PAIN SCALES - GENERAL
PAINLEVEL_OUTOF10: 5
PAINLEVEL_OUTOF10: 3
PAINLEVEL_OUTOF10: 2

## 2023-11-10 ASSESSMENT — PAIN DESCRIPTION - DESCRIPTORS
DESCRIPTORS: DISCOMFORT
DESCRIPTORS: DISCOMFORT
DESCRIPTORS: SORE

## 2023-11-10 ASSESSMENT — PAIN DESCRIPTION - LOCATION
LOCATION: THROAT
LOCATION: CHEST
LOCATION: CHEST

## 2023-11-10 ASSESSMENT — PAIN - FUNCTIONAL ASSESSMENT: PAIN_FUNCTIONAL_ASSESSMENT: NONE - DENIES PAIN

## 2023-11-10 ASSESSMENT — LIFESTYLE VARIABLES: SMOKING_STATUS: 0

## 2023-11-10 NOTE — DISCHARGE INSTRUCTIONS
Post-Operative Care Instruction for Oral Surgery     1. Stay well hydrated and be sure to drink plenty of liquids. Popsicles, ice creams, and cold drinks can help sooth the back of the throat. Stay away from drinks with red dye, as this can mimic the look of blood. 2. Soft diet day of surgery and you can start to progress to more solid foods as tolerated tomorrow. Avoid sharp foods such as tortilla chips. 3. No strenuous physical activity x 3 days. 4. Take opoid pain medication (Tylenol #3) only as needed for severe pain as directed. For mild and moderate pain you can alternate over-the-counter Tylenol and Motrin (can use the liquid prepreations if needed) for pain every 4 to 6 hours as directed by instructions printed on the medication bottle. You may also apply an ice pack to the throat as needed for pain. 5. You are encouraged to use a humidifier in the bedroom at home to keep the throat moist.   6. Go to your closest emergency room if you experience significant bright red bleeding from the mouth / back of the throat. Slight blood-tinged saliva is normal for the first 24 to 48 hours after surgery. 7.  You have dissolvable sutures at the site of your excision that will dissolve over the course of the next 1 week. 8. Return to the office as scheduled for your post-operative follow-up appointment. 9. Please Contact Dr. Jorje Saul office with any questions or concerns. ANESTHESIA DISCHARGE INSTRUCTIONS    Wear your seatbelt home. You are under the influence of drugs-do not drink alcohol, drive, operate machinery, make any important decisions or sign any legal documents for 24 hours. Children should not ride bikes, [place] or play on gym sets for 24 hours after surgery. A responsible adult needs to be with you for 24 hours. You may experience lightheadedness, dizziness, or sleepiness following surgery. Rest at home today- increase activity as tolerated.   Progress slowly to a regular diet

## 2023-11-10 NOTE — OP NOTE
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 SOFT PALATE LESION Tissue Tissue SURGICAL PATHOLOGY Eva Mireles DO 11/10/2023 1137        Implants:  * No implants in log *      Drains: * No LDAs found *    Findings: 1 cm papillomatous soft palate lesion    Detailed Description of Procedure:   Patient was seen in the preoperative area. Risk, benefits, alternatives were again discussed in detail with the patient. Consent was confirmed to be signed. The patient was brought to the operating room by the anesthesia team.  Patient was then placed under general anesthesia. Timeout was performed and agreed upon by the entirety of the operating room staff. A Villa Aurelio oral retractor was placed. After this approximately 1 ml of 1% lidocaine with 200,000 epinephrine was submucosally injected surrounding the left soft palate lesion. After allowing to this to sit for a few minutes a #15 blade was used to make a fusiform incision around the papillomatous lesion along the right soft palate. This was then dissected deeply and excised and sent for routine histopathological analysis. Bleeding was controlled with Bovie electrocautery. The site was irrigated with normal saline. The site of excision was then closed with 2 separate 4-0 chromic sutures in a simple erupted fashion. The Villa Aurelio oral retractor was then carefully removed from the oral cavity.   The patient was then turned over to the care of the anesthesia team for awakening

## 2023-11-10 NOTE — PROGRESS NOTES
Dr. Baez Rho at bedside and orders received/initiated. EKG/CXR/troponin complete. Pt states pain is improving. Resting comfortably.   Will continue to monitor

## 2023-11-10 NOTE — H&P
Date of Surgery Update:  Nathan Meza was seen, history and physical examination reviewed, and patient examined by me today. There have been no significant clinical changes since the completion of the previous history and physical performed on 11/6/23. The risk, benefits, and alternatives of the proposed procedure have been explained to the patient (or appropriate guardian) and understanding verbalized. All questions answered. Patient wishes to proceed.     Electronically signed by: Eva Mireles DO,11/10/2023,10:38 AM

## 2023-11-10 NOTE — ANESTHESIA PRE PROCEDURE
Department of Anesthesiology  Preprocedure Note       Name:  Florida Lovett   Age:  60 y.o.  :  1965                                          MRN:  [id number]         Date:  11/10/2023      Surgeon: Zofia Lacy):  Cathie Powers DO    Procedure: Procedure(s):  EXCISION OF SOFT PALATE LESION    Medications prior to admission:   Prior to Admission medications    Medication Sig Start Date End Date Taking? Authorizing Provider   Cholecalciferol (VITAMIN D3) 50 MCG ( UT) CAPS Take 1,000 Units by mouth daily   Yes Jayla Kingston MD   empagliflozin (JARDIANCE) 10 MG tablet Take 1 tablet by mouth daily 10/16/23   Key Kelly MD   amLODIPine [facility]) 5 MG tablet Take 1 tablet by mouth daily 10/16/23   Key Kelly MD   ranolazine (RANEXA) 500 MG extended release tablet Take 1 tablet by mouth 2 times daily 10/16/23   Key Kelly MD   dexAMETHasone Sodium Phosphate 120 MG/30ML injection APPLY 1.55 ML TO PAD PER PHYSICAL THERAPY VISIT  Patient not taking: Reported on 2023   Jayla Kingston MD   Ibuprofen 800 MG/200ML SOLN Take 800 mg by mouth daily as needed    Jayla Kingston MD   traMADol (ULTRAM) 50 MG tablet Take 1 tablet by mouth daily as needed. 23   Jayla Kingston MD   CRANBERRY PO Take by mouth 30,000, 2 tablets daily    Jayla Kingston MD   dexamethasone (DECADRON) 4 MG/ML injection 1.55cc applied to pad per physical therapy visit  Patient not taking: Reported on 11/10/2023 9/15/23   Aileen Mchugh MD   tiZANidine (ZANAFLEX) 4 MG tablet Take 1 tablet by mouth 3 times daily as needed (Muscle tightness/spasm) 23   Ezio Hi MD   nitroGLYCERIN (NITROSTAT) 0.4 MG SL tablet Place 1 tablet under the tongue every 5 minutes as needed for Chest pain No more than 3 tabs in a 15 minute period.  5/10/23   Key Kelly MD   pravastatin (PRAVACHOL) 40 MG tablet TAKE 1 TABLET AT BEDTIME 22   Jayla Kingston

## 2023-11-10 NOTE — ANESTHESIA POSTPROCEDURE EVALUATION
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     Angel Phase I: Angel Score: 8    Angel Phase II:        Anesthesia Post Evaluation    Patient location during evaluation: PACU  Patient participation: complete - patient participated  Level of consciousness: awake and alert  Airway patency: patent  Nausea & Vomiting: no vomiting and no nausea  Complications: no  Cardiovascular status: hemodynamically stable  Respiratory status: acceptable  Hydration status: stable  Pain management: adequate

## 2023-11-10 NOTE — PROGRESS NOTES
Pt stated that she just started having chest pain that is new post-op-VSS, Dr. Amy Vincent notified and will come evaluate pt at bedside-no distress noted at this time

## 2023-11-10 NOTE — PROGRESS NOTES
Pt awake and alert at this time. Pt on 2L NC, and VSS. Pt denies pain and nausea, tolerating PO. Skin warm. Awaiting test results prior to d/c. Pt meets criteria to be discharged from Phase 1.

## 2023-11-10 NOTE — PROGRESS NOTES
Pt arrived from OR to PACU bay 6. Reported received from [facility] staff. Pt arousable to voice. Surgical incisions dressings in place to soft palate. Pt on 6L NC. NSR, and VSS. Will continue to monitor.

## 2023-11-13 ENCOUNTER — TELEPHONE (OUTPATIENT)
Dept: ENT CLINIC | Age: 58
End: 2023-11-13

## 2023-11-13 ENCOUNTER — TELEPHONE (OUTPATIENT)
Dept: ORTHOPEDIC SURGERY | Age: 58
End: 2023-11-13

## 2023-11-13 NOTE — TELEPHONE ENCOUNTER
I called and spoke with the patient. Pathology from surgery on Friday consistent with squamous papilloma. We will see at follow-up.

## 2023-11-13 NOTE — TELEPHONE ENCOUNTER
Other PATIENT  CALLED TO CONFIRM WHICH MEDICATIONS SHE CAN TAKE THE DAY OF THE SURGERY. PLS CALL TO [facility] [phone]    
Patient is not on any blood thinners or NSAIDS. Not on any long-acting insulin. Was advised blood pressure medication. \"if you are taking blood pressure, you should continue  taking this medicine with a small sip of water. \"    
[FreeTextEntry1] : VIRAL ILLNESS\par FEVER GUIDELINES

## 2023-11-13 NOTE — FLOWSHEET NOTE
Pt has hx: sleep apnea      [address] time___730_____        Surgery time_______930_____    Take the following medications with a sip of water: Follow your MD/Surgeons pre-procedure instructions regarding your medications     Do not eat or drink anything after 12:00 midnight prior to your surgery. This includes water chewing gum, mints and ice chips. You may brush your teeth and gargle the morning of your surgery, but do not swallow the water     Please see your family doctor/pediatrician for a history and physical and/or concerning medications. Bring any test results/reports from your physicians office. If you are under the care of a heart doctor or specialist doctor, please be aware that you may be asked to them for clearance    You may be asked to stop blood thinners such as Coumadin, Plavix, Fragmin, Lovenox, etc., or any anti-inflammatories such as:  Aspirin, Ibuprofen, Advil, Naproxen prior to your surgery. We also ask that you stop any OTC medications such as fish oil, vitamin E, glucosamine, garlic, Multivitamins, COQ 10, etc.    We ask that you do not smoke 24 hours prior to surgery  We ask that you do not  drink any alcoholic beverages 24 hours prior to surgery     You must make arrangements for a responsible adult to take you home after your surgery. For your safety you will not be allowed to leave alone or drive yourself home. Your surgery will be cancelled if you do not have a ride home. Also for your safety, it is strongly suggested that someone stay with you the first 24 hours after your surgery. A parent or legal guardian must accompany a child scheduled for surgery and plan to stay at the hospital until the child is discharged. Please do not bring other children with you. For your comfort, please wear simple loose fitting clothing to the hospital.  Please do not bring valuables.     Do not wear any make-up or nail polish on

## 2023-11-13 NOTE — PROGRESS NOTES
[facility] Pre-Admission Testing Electronic Communication Worksheet for OR/ENDO Procedures        Patient: Juana Sorensen    DOS: 11/20    Arrival Time: 36    Surgery Time:930    Meds to Bed:  [x] YES    []  NO    Transportation Confirmed: [x] YES    []  NO    History and Physical:  [] YES    []  NO  [x] N/A  If yes, please list doctor or Urgent Care and date of H&P: per Arebi    Additional Clearance(Cardiac, Pulmonary, etc):  [] YES    [x]  NO    Pre-Admission Testing Visit:  [] YES    [x]  NO If no, do labs/testing need to be done DOS?   [] YES    []  NO    Medication Reconciliation Complete:  [x] YES    []  NO        Additional Notes:                Interview Complete: [x] YES    []  NO          Alpesh Jacob RN  10:51 AM

## 2023-11-13 NOTE — TELEPHONE ENCOUNTER
Pt called requesting biopsy results from Friday  She does not want to wait until her post op which is scheduled in 2 weeks.   She would like a call    Please call : [phone]    Thanks

## 2023-11-16 NOTE — TELEPHONE ENCOUNTER
Spoke to patient and explained if we do not have prior auth before 12 tomorrow we will have to cancel surg until we have the auth.

## 2023-11-16 NOTE — TELEPHONE ENCOUNTER
Auth: [employer]  Date: 11/20/23  Reference # [id number]  Spoke with: Lakisha Nicolas  Type of SX: OUTPATIENT  Location: [facility]  CPT: 38694 & [id number]   DX: [phone]  SX area: RIGHT HEEL  Insurance: [employer]

## 2023-11-17 ENCOUNTER — ANESTHESIA EVENT (OUTPATIENT)
Dept: OPERATING ROOM | Age: 58
End: 2023-11-17
Payer: COMMERCIAL

## 2023-11-20 ENCOUNTER — APPOINTMENT (OUTPATIENT)
Dept: GENERAL RADIOLOGY | Age: 58
End: 2023-11-20
Attending: ORTHOPAEDIC SURGERY
Payer: COMMERCIAL

## 2023-11-20 ENCOUNTER — ANESTHESIA (OUTPATIENT)
Dept: OPERATING ROOM | Age: 58
End: 2023-11-20
Payer: COMMERCIAL

## 2023-11-20 ENCOUNTER — HOSPITAL ENCOUNTER (OUTPATIENT)
Age: 58
Setting detail: OUTPATIENT SURGERY
Discharge: HOME | End: 2023-11-20
Attending: ORTHOPAEDIC SURGERY | Admitting: ORTHOPAEDIC SURGERY
Payer: COMMERCIAL

## 2023-11-20 VITALS
BODY MASS INDEX: 43.45 KG/M2 | RESPIRATION RATE: 18 BRPM | HEIGHT: 65 IN | SYSTOLIC BLOOD PRESSURE: 131 MMHG | WEIGHT: 260.8 LBS | TEMPERATURE: 97 F | HEART RATE: 88 BPM | DIASTOLIC BLOOD PRESSURE: 69 MMHG | OXYGEN SATURATION: 94 %

## 2023-11-20 DIAGNOSIS — M65.28 CALCIFIC ACHILLES TENDINITIS OF RIGHT LOWER EXTREMITY: Primary | ICD-10-CM

## 2023-11-20 PROCEDURE — 2580000003 HC RX 258: Performed by: ORTHOPAEDIC SURGERY

## 2023-11-20 PROCEDURE — 6370000000 HC RX 637 (ALT 250 FOR IP): Performed by: ANESTHESIOLOGY

## 2023-11-20 PROCEDURE — 3700000000 HC ANESTHESIA ATTENDED CARE: Performed by: ORTHOPAEDIC SURGERY

## 2023-11-20 PROCEDURE — 6360000002 HC RX W HCPCS: Performed by: NURSE ANESTHETIST, CERTIFIED REGISTERED

## 2023-11-20 PROCEDURE — 7100000011 HC PHASE II RECOVERY - ADDTL 15 MIN: Performed by: ORTHOPAEDIC SURGERY

## 2023-11-20 PROCEDURE — 7100000000 HC PACU RECOVERY - FIRST 15 MIN: Performed by: ORTHOPAEDIC SURGERY

## 2023-11-20 PROCEDURE — 3600000005 HC SURGERY LEVEL 5 BASE: Performed by: ORTHOPAEDIC SURGERY

## 2023-11-20 PROCEDURE — 6360000002 HC RX W HCPCS: Performed by: ORTHOPAEDIC SURGERY

## 2023-11-20 PROCEDURE — 2580000003 HC RX 258: Performed by: ANESTHESIOLOGY

## 2023-11-20 PROCEDURE — 2500000003 HC RX 250 WO HCPCS: Performed by: NURSE ANESTHETIST, CERTIFIED REGISTERED

## 2023-11-20 PROCEDURE — 6360000002 HC RX W HCPCS: Performed by: ANESTHESIOLOGY

## 2023-11-20 PROCEDURE — 2709999900 HC NON-CHARGEABLE SUPPLY: Performed by: ORTHOPAEDIC SURGERY

## 2023-11-20 PROCEDURE — 3700000001 HC ADD 15 MINUTES (ANESTHESIA): Performed by: ORTHOPAEDIC SURGERY

## 2023-11-20 PROCEDURE — C1713 ANCHOR/SCREW BN/BN,TIS/BN: HCPCS | Performed by: ORTHOPAEDIC SURGERY

## 2023-11-20 PROCEDURE — 7100000010 HC PHASE II RECOVERY - FIRST 15 MIN: Performed by: ORTHOPAEDIC SURGERY

## 2023-11-20 PROCEDURE — 7100000001 HC PACU RECOVERY - ADDTL 15 MIN: Performed by: ORTHOPAEDIC SURGERY

## 2023-11-20 PROCEDURE — 73650 X-RAY EXAM OF HEEL: CPT

## 2023-11-20 PROCEDURE — 3600000015 HC SURGERY LEVEL 5 ADDTL 15MIN: Performed by: ORTHOPAEDIC SURGERY

## 2023-11-20 PROCEDURE — A4217 STERILE WATER/SALINE, 500 ML: HCPCS | Performed by: ORTHOPAEDIC SURGERY

## 2023-11-20 PROCEDURE — 2580000003 HC RX 258: Performed by: NURSE ANESTHETIST, CERTIFIED REGISTERED

## 2023-11-20 DEVICE — PACK SUT ANCHR BIOCOMPOSITE SWIVELOCK CONVENIENCE DRL GUID: Type: IMPLANTABLE DEVICE | Site: ANKLE | Status: FUNCTIONAL

## 2023-11-20 RX ORDER — ONDANSETRON 2 MG/ML
4 INJECTION INTRAMUSCULAR; INTRAVENOUS
Status: DISCONTINUED | OUTPATIENT
Start: 2023-11-20 | End: 2023-11-20 | Stop reason: HOSPADM

## 2023-11-20 RX ORDER — PROMETHAZINE HYDROCHLORIDE 25 MG/1
25 TABLET ORAL EVERY 6 HOURS PRN
Qty: 60 TABLET | Refills: 0 | Status: SHIPPED | OUTPATIENT
Start: 2023-11-20

## 2023-11-20 RX ORDER — SUCCINYLCHOLINE/SOD CL,ISO/PF 200MG/10ML
SYRINGE (ML) INTRAVENOUS PRN
Status: DISCONTINUED | OUTPATIENT
Start: 2023-11-20 | End: 2023-11-20 | Stop reason: SDUPTHER

## 2023-11-20 RX ORDER — CEPHALEXIN 500 MG/1
500 CAPSULE ORAL 4 TIMES DAILY
Qty: 20 CAPSULE | Refills: 0 | Status: SHIPPED | OUTPATIENT
Start: 2023-11-20 | End: 2023-11-25

## 2023-11-20 RX ORDER — KETOROLAC TROMETHAMINE 30 MG/ML
INJECTION, SOLUTION INTRAMUSCULAR; INTRAVENOUS PRN
Status: DISCONTINUED | OUTPATIENT
Start: 2023-11-20 | End: 2023-11-20 | Stop reason: SDUPTHER

## 2023-11-20 RX ORDER — GLYCOPYRROLATE 0.2 MG/ML
INJECTION INTRAMUSCULAR; INTRAVENOUS PRN
Status: DISCONTINUED | OUTPATIENT
Start: 2023-11-20 | End: 2023-11-20 | Stop reason: SDUPTHER

## 2023-11-20 RX ORDER — PROPOFOL 10 MG/ML
INJECTION, EMULSION INTRAVENOUS PRN
Status: DISCONTINUED | OUTPATIENT
Start: 2023-11-20 | End: 2023-11-20 | Stop reason: SDUPTHER

## 2023-11-20 RX ORDER — MIDAZOLAM HYDROCHLORIDE 1 MG/ML
INJECTION INTRAMUSCULAR; INTRAVENOUS PRN
Status: DISCONTINUED | OUTPATIENT
Start: 2023-11-20 | End: 2023-11-20 | Stop reason: SDUPTHER

## 2023-11-20 RX ORDER — HYDROCODONE BITARTRATE AND ACETAMINOPHEN 5; 325 MG/1; MG/1
1 TABLET ORAL
Status: COMPLETED | OUTPATIENT
Start: 2023-11-20 | End: 2023-11-20

## 2023-11-20 RX ORDER — SODIUM CHLORIDE 0.9 % (FLUSH) 0.9 %
5-40 SYRINGE (ML) INJECTION EVERY 12 HOURS SCHEDULED
Status: DISCONTINUED | OUTPATIENT
Start: 2023-11-20 | End: 2023-11-20 | Stop reason: HOSPADM

## 2023-11-20 RX ORDER — MEPERIDINE HYDROCHLORIDE 25 MG/ML
12.5 INJECTION INTRAMUSCULAR; INTRAVENOUS; SUBCUTANEOUS
Status: DISCONTINUED | OUTPATIENT
Start: 2023-11-20 | End: 2023-11-20 | Stop reason: HOSPADM

## 2023-11-20 RX ORDER — ONDANSETRON 2 MG/ML
INJECTION INTRAMUSCULAR; INTRAVENOUS PRN
Status: DISCONTINUED | OUTPATIENT
Start: 2023-11-20 | End: 2023-11-20 | Stop reason: SDUPTHER

## 2023-11-20 RX ORDER — SODIUM CHLORIDE 0.9 % (FLUSH) 0.9 %
5-40 SYRINGE (ML) INJECTION PRN
Status: DISCONTINUED | OUTPATIENT
Start: 2023-11-20 | End: 2023-11-20 | Stop reason: HOSPADM

## 2023-11-20 RX ORDER — LIDOCAINE HYDROCHLORIDE 20 MG/ML
INJECTION, SOLUTION EPIDURAL; INFILTRATION; INTRACAUDAL; PERINEURAL PRN
Status: DISCONTINUED | OUTPATIENT
Start: 2023-11-20 | End: 2023-11-20 | Stop reason: SDUPTHER

## 2023-11-20 RX ORDER — BUPIVACAINE HYDROCHLORIDE 5 MG/ML
INJECTION, SOLUTION EPIDURAL; INTRACAUDAL
Status: COMPLETED | OUTPATIENT
Start: 2023-11-20 | End: 2023-11-20

## 2023-11-20 RX ORDER — SODIUM CHLORIDE 9 MG/ML
INJECTION, SOLUTION INTRAVENOUS PRN
Status: DISCONTINUED | OUTPATIENT
Start: 2023-11-20 | End: 2023-11-20 | Stop reason: HOSPADM

## 2023-11-20 RX ORDER — HYDROCODONE BITARTRATE AND ACETAMINOPHEN 5; 325 MG/1; MG/1
1 TABLET ORAL EVERY 6 HOURS PRN
Qty: 20 TABLET | Refills: 0 | Status: SHIPPED | OUTPATIENT
Start: 2023-11-20 | End: 2023-11-25

## 2023-11-20 RX ORDER — FENTANYL CITRATE 50 UG/ML
INJECTION, SOLUTION INTRAMUSCULAR; INTRAVENOUS PRN
Status: DISCONTINUED | OUTPATIENT
Start: 2023-11-20 | End: 2023-11-20 | Stop reason: SDUPTHER

## 2023-11-20 RX ORDER — DEXAMETHASONE SODIUM PHOSPHATE 4 MG/ML
INJECTION, SOLUTION INTRA-ARTICULAR; INTRALESIONAL; INTRAMUSCULAR; INTRAVENOUS; SOFT TISSUE PRN
Status: DISCONTINUED | OUTPATIENT
Start: 2023-11-20 | End: 2023-11-20 | Stop reason: SDUPTHER

## 2023-11-20 RX ORDER — DIPHENHYDRAMINE HYDROCHLORIDE 50 MG/ML
6.25 INJECTION INTRAMUSCULAR; INTRAVENOUS ONCE
Status: COMPLETED | OUTPATIENT
Start: 2023-11-20 | End: 2023-11-20

## 2023-11-20 RX ORDER — SODIUM CHLORIDE 9 MG/ML
INJECTION, SOLUTION INTRAVENOUS CONTINUOUS PRN
Status: DISCONTINUED | OUTPATIENT
Start: 2023-11-20 | End: 2023-11-20 | Stop reason: SDUPTHER

## 2023-11-20 RX ORDER — APREPITANT 40 MG/1
40 CAPSULE ORAL ONCE
Status: DISCONTINUED | OUTPATIENT
Start: 2023-11-20 | End: 2023-11-20 | Stop reason: HOSPADM

## 2023-11-20 RX ORDER — FENTANYL CITRATE 0.05 MG/ML
50 INJECTION, SOLUTION INTRAMUSCULAR; INTRAVENOUS EVERY 5 MIN PRN
Status: DISCONTINUED | OUTPATIENT
Start: 2023-11-20 | End: 2023-11-20 | Stop reason: HOSPADM

## 2023-11-20 RX ORDER — FENTANYL CITRATE 0.05 MG/ML
25 INJECTION, SOLUTION INTRAMUSCULAR; INTRAVENOUS EVERY 5 MIN PRN
Status: DISCONTINUED | OUTPATIENT
Start: 2023-11-20 | End: 2023-11-20 | Stop reason: HOSPADM

## 2023-11-20 RX ADMIN — SODIUM CHLORIDE: 9 INJECTION, SOLUTION INTRAVENOUS at 08:57

## 2023-11-20 RX ADMIN — FENTANYL CITRATE 75 MCG: 50 INJECTION INTRAMUSCULAR; INTRAVENOUS at 10:23

## 2023-11-20 RX ADMIN — GLYCOPYRROLATE 0.2 MG: 0.2 INJECTION INTRAMUSCULAR; INTRAVENOUS at 10:18

## 2023-11-20 RX ADMIN — MIDAZOLAM 1 MG: 1 INJECTION INTRAMUSCULAR; INTRAVENOUS at 10:23

## 2023-11-20 RX ADMIN — FENTANYL CITRATE 25 MCG: 50 INJECTION INTRAMUSCULAR; INTRAVENOUS at 10:18

## 2023-11-20 RX ADMIN — FENTANYL CITRATE 25 MCG: 0.05 INJECTION, SOLUTION INTRAMUSCULAR; INTRAVENOUS at 12:13

## 2023-11-20 RX ADMIN — MIDAZOLAM 1 MG: 1 INJECTION INTRAMUSCULAR; INTRAVENOUS at 10:18

## 2023-11-20 RX ADMIN — PROPOFOL 200 MG: 10 INJECTION, EMULSION INTRAVENOUS at 10:23

## 2023-11-20 RX ADMIN — SODIUM CHLORIDE: 9 INJECTION, SOLUTION INTRAVENOUS at 10:18

## 2023-11-20 RX ADMIN — LIDOCAINE HYDROCHLORIDE 60 MG: 20 INJECTION, SOLUTION EPIDURAL; INFILTRATION; INTRACAUDAL; PERINEURAL at 10:23

## 2023-11-20 RX ADMIN — HYDROMORPHONE HYDROCHLORIDE 1 MG: 1 INJECTION, SOLUTION INTRAMUSCULAR; INTRAVENOUS; SUBCUTANEOUS at 11:32

## 2023-11-20 RX ADMIN — SODIUM CHLORIDE 2000 MG: 900 INJECTION INTRAVENOUS at 10:18

## 2023-11-20 RX ADMIN — DEXAMETHASONE SODIUM PHOSPHATE 8 MG: 4 INJECTION, SOLUTION INTRAMUSCULAR; INTRAVENOUS at 10:25

## 2023-11-20 RX ADMIN — ONDANSETRON 4 MG: 2 INJECTION INTRAMUSCULAR; INTRAVENOUS at 11:06

## 2023-11-20 RX ADMIN — KETOROLAC TROMETHAMINE 30 MG: 30 INJECTION, SOLUTION INTRAMUSCULAR at 10:25

## 2023-11-20 RX ADMIN — Medication 120 MG: at 10:23

## 2023-11-20 RX ADMIN — DIPHENHYDRAMINE HYDROCHLORIDE 6.5 MG: 50 INJECTION INTRAMUSCULAR; INTRAVENOUS at 12:18

## 2023-11-20 RX ADMIN — HYDROCODONE BITARTRATE AND ACETAMINOPHEN 1 TABLET: 5; 325 TABLET ORAL at 13:03

## 2023-11-20 ASSESSMENT — PAIN SCALES - GENERAL
PAINLEVEL_OUTOF10: 3
PAINLEVEL_OUTOF10: 5
PAINLEVEL_OUTOF10: 3

## 2023-11-20 ASSESSMENT — PAIN - FUNCTIONAL ASSESSMENT
PAIN_FUNCTIONAL_ASSESSMENT: 0-10
PAIN_FUNCTIONAL_ASSESSMENT: PREVENTS OR INTERFERES SOME ACTIVE ACTIVITIES AND ADLS
PAIN_FUNCTIONAL_ASSESSMENT: ACTIVITIES ARE NOT PREVENTED
PAIN_FUNCTIONAL_ASSESSMENT: PREVENTS OR INTERFERES SOME ACTIVE ACTIVITIES AND ADLS

## 2023-11-20 ASSESSMENT — PAIN DESCRIPTION - DESCRIPTORS
DESCRIPTORS: SHARP
DESCRIPTORS: SHARP
DESCRIPTORS: ACHING;DISCOMFORT
DESCRIPTORS: SHARP

## 2023-11-20 ASSESSMENT — PAIN DESCRIPTION - PAIN TYPE
TYPE: ACUTE PAIN
TYPE: SURGICAL PAIN
TYPE: SURGICAL PAIN

## 2023-11-20 ASSESSMENT — PAIN DESCRIPTION - FREQUENCY
FREQUENCY: CONTINUOUS

## 2023-11-20 ASSESSMENT — PAIN DESCRIPTION - ONSET
ONSET: ON-GOING
ONSET: ON-GOING

## 2023-11-20 ASSESSMENT — PAIN DESCRIPTION - LOCATION
LOCATION: LEG
LOCATION: FOOT
LOCATION: FOOT

## 2023-11-20 ASSESSMENT — PAIN DESCRIPTION - ORIENTATION
ORIENTATION: RIGHT
ORIENTATION: RIGHT;POSTERIOR
ORIENTATION: RIGHT

## 2023-11-20 ASSESSMENT — ENCOUNTER SYMPTOMS: SHORTNESS OF BREATH: 1

## 2023-11-20 NOTE — H&P
Preoperative H&P Update    The patient's History and Physical in the medical record from 11/9/2023 was reviewed by me today. Past Medical History:   Diagnosis Date    Arthritis     Asthma     resolved, no meds    Depression     H/O drug overdose 2002    suicide attempt, cardiac arrest    Headache(784.0)     Hyperlipemia     Hypertension     Meniere's disease     Migraines     PONV (postoperative nausea and vomiting)     Sleep apnea     cpap, does not use    Thyroid nodule     TIA (transient ischemic attack)     Wears hearing aid     bilateral hearing aids     Past Surgical History:   Procedure Laterality Date    ABDOMEN SURGERY      BREAST BIOPSY      CARPAL TUNNEL RELEASE Bilateral     CERVIX REMOVAL  12/08/2016    CHOLECYSTECTOMY  2008    COLONOSCOPY      DEBRIDEMENT TENNIS ELBOW Bilateral     DEBRIDEMENT TENNIS ELBOW      ELBOW SURGERY Bilateral     tennis elbow    ENDOMETRIAL ABLATION  1999    ENDOSCOPY, COLON, DIAGNOSTIC      ESOPHAGOGASTRODUODENOSCOPY      FOOT SURGERY Left 2002    2 screws , anchor    HYSTERECTOMY (CERVIX STATUS UNKNOWN)  2009    laparoscopic supracervical hyst for fibroids    JOINT REPLACEMENT Right     knee    LAP BAND  2008    removal 2011    MOUTH SURGERY N/A 11/10/2023    EXCISION OF SOFT PALATE LESION WITH CLOSURE performed by Joshua Love DO at [facility] Bilateral     Menier's surgery    SHOULDER ARTHROSCOPY Right 11/18/2022    RIGHT SHOULDER ARTHROSCOPY; SUBACROMIAL DECOMPRESSION, ROTATOR CUFF TENDON REPAIR - BLOCK, EXCISION OF RIGHT UPPER ARM/SHOULDER performed by Bell Conrad MD at [address] Right 2019    TUBAL LIGATION  2003     No current facility-administered medications on file prior to encounter.      Current Outpatient Medications on File Prior to Encounter   Medication Sig Dispense Refill    ondansetron (ZOFRAN) 8 MG tablet Take 1 tablet by mouth every 8 hours as needed for Nausea or

## 2023-11-20 NOTE — PROGRESS NOTES
Pt states ready to go home. Assessment unchanged with circ cks positive. No c/o pain nausea or any other c/o. Pt given crutches and states knows how to use them. Dressing dry and intact. Pt had crackers and soda. Pt given Rx.

## 2023-11-20 NOTE — DISCHARGE INSTRUCTIONS
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 3- NWB right ankle  4- Elevation surgical site, with ice  5- Keep splint dry and clean  6- F/U in 2 weeks.   7- For DVT prophylaxis- Aspirin 325 mg daily     Esaw MD Tabby, 11/20/2023

## 2023-11-21 NOTE — BRIEF OP NOTE
Brief Postoperative Note      Patient: Nathan Meza  YOB: 1965  MRN: [id number]    Date of Procedure: 11/20/2023    Pre-Op Diagnosis Codes:     * Tendonitis, Achilles, right [M76.61]    Post-Op Diagnosis: Same       Procedure(s):  RIGHT INSERTIONAL ACHILLES TENDONITIS WITH CALCANEUS PARTIAL EXCISION WITH SECONDARY REPAIR OF ACHILLES TENDON    Surgeon(s):  Saúl Stark MD    Assistant: Shanell Maloney CNP    Anesthesia: General    Estimated Blood Loss (mL): Minimal    Complications: None    Specimens:   * No specimens in log *    Implants:  Implant Name Type Inv. Item Serial No.  Lot No. LRB No. Used Action   [facility] [employer] [facility] MARC GUID - [id number]  PACK [facility] [address] MARC GUID  95 [facility] [employer]- [id number] Right 1 Implanted         Drains: * No LDAs found *    Findings: Same.       Electronically signed by Saúl Stark MD on 11/20/2023 at 10:06 PM

## 2023-11-28 NOTE — TELEPHONE ENCOUNTER
PT aware of Sx time change    Sx- 9:00am  Arrival- 7:00am Unable to meet needs:  [facility]    Care exceeds current capacity:  [facility]    Doesn't take insurance for SNF:  [facility]    Medically denied - behaviors:  Yin Shepherd  [facility]

## 2023-12-01 DIAGNOSIS — M92.61 HAGLUND'S DEFORMITY OF RIGHT HEEL: Primary | ICD-10-CM

## 2023-12-01 DIAGNOSIS — R50.82 POSTOPERATIVE FEVER: ICD-10-CM

## 2023-12-01 DIAGNOSIS — M65.28 CALCIFIC ACHILLES TENDINITIS OF RIGHT LOWER EXTREMITY: ICD-10-CM

## 2023-12-01 RX ORDER — CEPHALEXIN 500 MG/1
500 CAPSULE ORAL 4 TIMES DAILY
Qty: 40 CAPSULE | Refills: 0 | Status: SHIPPED | OUTPATIENT
Start: 2023-12-01 | End: 2023-12-11

## 2023-12-01 NOTE — TELEPHONE ENCOUNTER
advised to send 10 day supply of Keflex for patient. Discussed managing nausea with the antibiotic. Discussed continuing tylenol to manage fever. Pain med refill declined by patient. Dr Gucci Maxwell would like Lavella Sav to keep the PO dressings in place until her follow up. She understands to call if the dressings become saturated with any fluids. I advised the patient to report to the ER this weekend if fever advances to 103, she develops shortness of breath, or difficulty with deep breathing.

## 2023-12-01 NOTE — TELEPHONE ENCOUNTER
General Question     Subject: POST OP FEVER  Patient and /or Facility Request: Bertha Cintron  Contact Number: [phone]    PT CALLED STATING SHE HAD SURGERY WITH DR BURNETT ON 11/20/23. PT STATED SHE HAS BEEN RUNNING A FEVER ON AND OFF SINCE SURGERY AS WELL AS BEING SICK TO HER STOMACH AND HAVING BRAIN FOG. PT STATED HER FEVER HAS GOTTEN .2. PT IS WANTING TO SPEAK WITH CLINICAL STAFF REGARDING THIS. PLEASE CALL PT BACK AT THE ABOVE NUMBER.

## 2023-12-01 NOTE — TELEPHONE ENCOUNTER
Has taken tylenol to break fever, but it returns. Has used Phenergan for nausea and the nausea has improved some over the last few days where she does not need to take it as much. No SOB or difficulty taking a deep breath. No cough. No increased pain in surgical area, but feels like the area is \"sticky\". No one in house is sick.

## 2023-12-05 ENCOUNTER — OFFICE VISIT (OUTPATIENT)
Dept: ORTHOPEDIC SURGERY | Age: 58
End: 2023-12-05
Payer: COMMERCIAL

## 2023-12-05 VITALS — BODY MASS INDEX: 43.45 KG/M2 | WEIGHT: 260.8 LBS | HEIGHT: 65 IN

## 2023-12-05 DIAGNOSIS — M65.28 CALCIFIC ACHILLES TENDINITIS OF RIGHT LOWER EXTREMITY: ICD-10-CM

## 2023-12-05 DIAGNOSIS — M92.61 HAGLUND'S DEFORMITY OF RIGHT HEEL: Primary | ICD-10-CM

## 2023-12-05 PROCEDURE — 99024 POSTOP FOLLOW-UP VISIT: CPT | Performed by: NURSE PRACTITIONER

## 2023-12-05 PROCEDURE — L4361 PNEUMA/VAC WALK BOOT PRE OTS: HCPCS | Performed by: NURSE PRACTITIONER

## 2023-12-05 PROCEDURE — APPNB15 APP NON BILLABLE TIME 0-15 MINS: Performed by: NURSE PRACTITIONER

## 2023-12-05 NOTE — PROGRESS NOTES
DIAGNOSIS:  Right foot partal Jerrica's calcaneus excision, 2ry repair Achillis tendon. DATE OF SURGERY: 11/20/2023, Suture bridge    HISTORY OF PRESENT ILLNESS:  Ms. Emperatriz Vila 60 y.o.  female who came in today for 2 weeks postoperative visit. The patient denies any significant pain in the right heel. Rates pain a 1/10 VAS mild, aching, intermittent and are improving. Aggravating factors movement. Alleviating factors elevation and rest. She has been in a splint , and non WB. No numbness or tingling sensation. No fever or Chills. She is retired. Denies smoking. PHYSICAL EXAMINATION:  The incision healing well . No signs of any erythema or drainage, minimal swelling. She has no pain with the active or passive range of motion of the right ankle and subtalar, but decrease ROM. She has intact sensation distally, and she is neurovascularly intact. IMAGING:  Two views right calcaneus taken today in office showed removal of Jerrica's, no acute fracture. IMPRESSION:  2 weeks out from right foot partal Jerrica's calcaneus excision, 2ry repair Achillis tendon, and doing very well. PLAN: She placed in a boot with a heel lift, and non WB for 6 weeks. I have told the patient to work on ROM. ASA for DVT prophylaxis. She was instructed to avoid any pressure on the back of her heel. She was instructed on incisional care. The patient will come back for a follow up in 6 weeks. At that time, we will take 2 views of the right calcaneus. Procedures    Prema / Jenaro Klein Walking Boot     Patient was prescribed a [employer] (P)g Tall Radha Walking Boot. The right ankle will require stabilization / immobilization from this semi-rigid / rigid orthosis to improve their function. The orthosis will assist in protecting the affected area, provide functional support and facilitate healing. Patient was instructed to progress ambulation  as non weight bearing in the device.   The plan of care is to progress the

## 2024-01-10 PROBLEM — Z88.8 ALLERGY TO IODINE: Status: ACTIVE | Noted: 2024-01-10

## 2024-01-10 PROBLEM — R42 LIGHTHEADEDNESS: Status: ACTIVE | Noted: 2024-01-10

## 2024-01-10 PROBLEM — G47.33 OBSTRUCTIVE SLEEP APNEA SYNDROME: Status: ACTIVE | Noted: 2024-01-10

## 2024-01-10 PROBLEM — I20.1 PRINZMETAL ANGINA (HCC): Status: ACTIVE | Noted: 2024-01-10

## 2024-01-10 PROBLEM — I51.89 DIASTOLIC DYSFUNCTION: Status: ACTIVE | Noted: 2024-01-10

## 2024-01-16 ENCOUNTER — OFFICE VISIT (OUTPATIENT)
Dept: ORTHOPEDIC SURGERY | Age: 59
End: 2024-01-16
Payer: COMMERCIAL

## 2024-01-16 VITALS — WEIGHT: 260.8 LBS | HEIGHT: 65 IN | BODY MASS INDEX: 43.45 KG/M2

## 2024-01-16 DIAGNOSIS — M92.61 HAGLUND'S DEFORMITY OF RIGHT HEEL: Primary | ICD-10-CM

## 2024-01-16 DIAGNOSIS — S86.011A ACHILLES RUPTURE, RIGHT, INITIAL ENCOUNTER: ICD-10-CM

## 2024-01-16 PROCEDURE — 99213 OFFICE O/P EST LOW 20 MIN: CPT | Performed by: NURSE PRACTITIONER

## 2024-01-16 NOTE — PROGRESS NOTES
triamterene-hydrochlorothiazide (MAXZIDE-25) 37.5-25 MG per tablet Take 1 tablet by mouth daily      Omega-3 Fatty Acids (FISH OIL) 1000 MG CPDR Take by mouth 2 times daily      sertraline (ZOLOFT) 100 MG tablet Take 1.5 tablets by mouth every morning      meclizine (ANTIVERT) 25 MG tablet Take 1 tablet by mouth 2 times daily as needed      promethazine (PHENERGAN) 25 MG tablet Take 1 tablet by mouth every 6 hours as needed for Nausea      therapeutic multivitamin-minerals (THERAGRAN-M) tablet Take 1 tablet by mouth daily      omeprazole (PRILOSEC) 20 MG capsule Take 1 capsule by mouth 2 times daily       No current facility-administered medications for this visit.       Pertinent items are noted in HPI  Review of systems reviewed from Patient History Form and available in the patient's chart under the Media tab. No change noted.    PHYSICAL EXAMINATION:  Ms. Sanford is a very pleasant 58 y.o.  female who presents today in no acute distress, awake, alert, and oriented.  She is well dressed, nourished and  groomed.  Patient with normal affect.  Height is  1.655 m (5' 5.16\"), weight is 118.3 kg (260 lb 12.9 oz), Body mass index is 43.19 kg/m².  Resting respiratory rate is 16.       She ambulates partial weightbearing in a boot using assistance.  The incision healing well .  No signs of any erythema or drainage, minimal swelling.  She has no pain with the active or passive range of motion of the right ankle and subtalar, but decrease ROM.  She has intact sensation distally, and she is neurovascularly intact.  Positive [place] for Achilles rupture, but this was difficult to assess due to body habitus.    IMAGING:  Two views right calcaneus taken today in office showed removal of Jerrica's, no acute fracture.    IMPRESSION:    1-6 weeks out from right foot partal Jerrica's calcaneus excision, 2ry repair Achillis tendon, and doing very well.  2-Right posterior heel pain/new fall, achilles tendon midsubstance

## 2024-01-18 ENCOUNTER — TELEPHONE (OUTPATIENT)
Dept: ORTHOPEDIC SURGERY | Age: 59
End: 2024-01-18

## 2024-01-18 NOTE — TELEPHONE ENCOUNTER
Spoke with patient. Called to inform her that her MRI did not require approval. She stated that her MRI and FU visit was already scheduled.

## 2024-01-30 ENCOUNTER — TELEPHONE (OUTPATIENT)
Dept: ORTHOPEDIC SURGERY | Age: 59
End: 2024-01-30

## 2024-01-30 NOTE — TELEPHONE ENCOUNTER
Spoke with patient. She has her MRI scheduled for Saturday 2/3/24. She requested to move her appointment up soon due to pain. I suggested that she keep her appointment for 2/8/24 to make sure we had the MRI results before her appointment. Pt agreed and understood.

## 2024-01-30 NOTE — TELEPHONE ENCOUNTER
Appointment Request     Patient requesting earlier appointment: Yes  Appointment offered to patient: YES  Patient Contact Number: [phone]    [facility]

## 2024-02-03 ENCOUNTER — HOSPITAL ENCOUNTER (OUTPATIENT)
Dept: MRI IMAGING | Age: 59
Discharge: HOME | End: 2024-02-03
Payer: COMMERCIAL

## 2024-02-03 DIAGNOSIS — S86.011A ACHILLES RUPTURE, RIGHT, INITIAL ENCOUNTER: ICD-10-CM

## 2024-02-03 PROCEDURE — 73721 MRI JNT OF LWR EXTRE W/O DYE: CPT

## 2024-02-05 ENCOUNTER — TELEPHONE (OUTPATIENT)
Dept: ORTHOPEDIC SURGERY | Age: 59
End: 2024-02-05

## 2024-02-05 NOTE — TELEPHONE ENCOUNTER
Appointment Request     Patient requesting earlier appointment: Yes  Appointment offered to patient: THURSDAY  Patient Contact Number: [phone]      THE PT NEEDS A SOONER APPT THAN THURSDAY FOR POSSIBLE SX ON HER ANKLE.  MRI HAS BEEN DONE.  SHE PREFERS [facility] BUT WILL GO TO [facility]

## 2024-02-06 ENCOUNTER — OFFICE VISIT (OUTPATIENT)
Dept: ORTHOPEDIC SURGERY | Age: 59
End: 2024-02-06
Payer: COMMERCIAL

## 2024-02-06 VITALS — WEIGHT: 260 LBS | BODY MASS INDEX: 43.32 KG/M2 | HEIGHT: 65 IN

## 2024-02-06 DIAGNOSIS — S86.011A ACHILLES RUPTURE, RIGHT, INITIAL ENCOUNTER: Primary | ICD-10-CM

## 2024-02-06 DIAGNOSIS — M92.61 HAGLUND'S DEFORMITY OF RIGHT HEEL: ICD-10-CM

## 2024-02-06 PROCEDURE — 99214 OFFICE O/P EST MOD 30 MIN: CPT | Performed by: ORTHOPAEDIC SURGERY

## 2024-02-07 ENCOUNTER — TELEPHONE (OUTPATIENT)
Dept: ORTHOPEDIC SURGERY | Age: 59
End: 2024-02-07

## 2024-02-14 PROBLEM — S86.011A ACHILLES RUPTURE, RIGHT, INITIAL ENCOUNTER: Status: ACTIVE | Noted: 2024-02-14

## 2024-02-14 NOTE — PROGRESS NOTES
DIAGNOSIS:    1-Right foot partal Jerrica's calcaneus excision, 2ry repair Achillis tendon.  2-Right posterior heel pain/new fall, achilles tendon midsubstance tear    DATE OF SURGERY: 11/20/2023, Suture bridge    HISTORY OF PRESENT ILLNESS:  Maddi Sanford 58 y.o.  female who came in today for f/u evaluation of new injury right ankle.  The patient denies any significant pain in the right heel. Rates pain a 6/10 VAS mild, aching, intermittent and are improving in the back of the heel, but she had a new fall 3 times when she noticed increased weakness and sharp pain posterior right ankle. Aggravating factors movement and walking. Alleviating factors elevation and rest. She has been in a boot, and WB.  No numbness or tingling sensation. No fever or Chills.  She is retired.  Denies smoking.    Past Medical History:   Diagnosis Date    Arthritis     Asthma     resolved, no meds    Depression     H/O drug overdose 2002    suicide attempt, cardiac arrest    Headache(784.0)     Hyperlipemia     Hypertension     Meniere's disease     Migraines     PONV (postoperative nausea and vomiting)     Sleep apnea     cpap, does not use    Thyroid nodule     TIA (transient ischemic attack)     Wears hearing aid     bilateral hearing aids       Past Surgical History:   Procedure Laterality Date    ABDOMEN SURGERY      ACHILLES TENDON SURGERY Right 11/20/2023    RIGHT INSERTIONAL ACHILLES TENDONITIS WITH CALCANEUS PARTIAL EXCISION WITH SECONDARY REPAIR OF ACHILLES TENDON performed by Ranjana Fuller MD at [facility] OR    BREAST BIOPSY      CARPAL TUNNEL RELEASE Bilateral     CERVIX REMOVAL  12/08/2016    CHOLECYSTECTOMY  2008    COLONOSCOPY      DEBRIDEMENT TENNIS ELBOW Bilateral     DEBRIDEMENT TENNIS ELBOW      ELBOW SURGERY Bilateral     tennis elbow    ENDOMETRIAL ABLATION  1999    ENDOSCOPY, COLON, DIAGNOSTIC      ESOPHAGOGASTRODUODENOSCOPY      FOOT SURGERY Left 2002    2 screws , anchor    HYSTERECTOMY (CERVIX STATUS UNKNOWN)

## 2024-02-20 ENCOUNTER — ANESTHESIA EVENT (OUTPATIENT)
Dept: OPERATING ROOM | Age: 59
End: 2024-02-20
Payer: COMMERCIAL

## 2024-02-22 ENCOUNTER — HOSPITAL ENCOUNTER (OUTPATIENT)
Age: 59
Setting detail: OUTPATIENT SURGERY
Discharge: HOME | End: 2024-02-22
Attending: ORTHOPAEDIC SURGERY | Admitting: ORTHOPAEDIC SURGERY
Payer: COMMERCIAL

## 2024-02-22 ENCOUNTER — ANESTHESIA (OUTPATIENT)
Dept: OPERATING ROOM | Age: 59
End: 2024-02-22
Payer: COMMERCIAL

## 2024-02-22 ENCOUNTER — APPOINTMENT (OUTPATIENT)
Dept: GENERAL RADIOLOGY | Age: 59
End: 2024-02-22
Attending: ORTHOPAEDIC SURGERY
Payer: COMMERCIAL

## 2024-02-22 VITALS
HEIGHT: 65 IN | BODY MASS INDEX: 44.15 KG/M2 | OXYGEN SATURATION: 86 % | HEART RATE: 88 BPM | WEIGHT: 265 LBS | DIASTOLIC BLOOD PRESSURE: 72 MMHG | RESPIRATION RATE: 60 BRPM | SYSTOLIC BLOOD PRESSURE: 123 MMHG | TEMPERATURE: 97.6 F

## 2024-02-22 LAB
ANION GAP SERPL CALCULATED.3IONS-SCNC: 11 MMOL/L (ref 3–16)
BUN SERPL-MCNC: 15 MG/DL (ref 7–20)
CALCIUM SERPL-MCNC: 9.5 MG/DL (ref 8.3–10.6)
CHLORIDE SERPL-SCNC: 103 MMOL/L (ref 99–110)
CO2 SERPL-SCNC: 26 MMOL/L (ref 21–32)
CREAT SERPL-MCNC: 0.7 MG/DL (ref 0.6–1.1)
DEPRECATED RDW RBC AUTO: 13.6 % (ref 12.4–15.4)
GFR SERPLBLD CREATININE-BSD FMLA CKD-EPI: >60 ML/MIN/{1.73_M2}
GLUCOSE SERPL-MCNC: 104 MG/DL (ref 70–99)
HCT VFR BLD AUTO: 42.6 % (ref 36–48)
HGB BLD-MCNC: 14.3 G/DL (ref 12–16)
MAGNESIUM SERPL-MCNC: 2.1 MG/DL (ref 1.8–2.4)
MCH RBC QN AUTO: 29.6 PG (ref 26–34)
MCHC RBC AUTO-ENTMCNC: 33.6 G/DL (ref 31–36)
MCV RBC AUTO: 88 FL (ref 80–100)
PLATELET # BLD AUTO: 267 K/UL (ref 135–450)
PMV BLD AUTO: 9.6 FL (ref 5–10.5)
POTASSIUM SERPL-SCNC: 3.5 MMOL/L (ref 3.5–5.1)
POTASSIUM SERPL-SCNC: 3.5 MMOL/L (ref 3.5–5.1)
RBC # BLD AUTO: 4.84 M/UL (ref 4–5.2)
SODIUM SERPL-SCNC: 140 MMOL/L (ref 136–145)
WBC # BLD AUTO: 5.3 K/UL (ref 4–11)

## 2024-02-22 PROCEDURE — 7100000001 HC PACU RECOVERY - ADDTL 15 MIN: Performed by: ORTHOPAEDIC SURGERY

## 2024-02-22 PROCEDURE — 2580000003 HC RX 258: Performed by: ANESTHESIOLOGY

## 2024-02-22 PROCEDURE — 83735 ASSAY OF MAGNESIUM: CPT

## 2024-02-22 PROCEDURE — 6360000002 HC RX W HCPCS: Performed by: NURSE ANESTHETIST, CERTIFIED REGISTERED

## 2024-02-22 PROCEDURE — 7100000000 HC PACU RECOVERY - FIRST 15 MIN: Performed by: ORTHOPAEDIC SURGERY

## 2024-02-22 PROCEDURE — 3600000014 HC SURGERY LEVEL 4 ADDTL 15MIN: Performed by: ORTHOPAEDIC SURGERY

## 2024-02-22 PROCEDURE — 6360000002 HC RX W HCPCS: Performed by: ANESTHESIOLOGY

## 2024-02-22 PROCEDURE — A4217 STERILE WATER/SALINE, 500 ML: HCPCS | Performed by: ORTHOPAEDIC SURGERY

## 2024-02-22 PROCEDURE — 6360000002 HC RX W HCPCS: Performed by: ORTHOPAEDIC SURGERY

## 2024-02-22 PROCEDURE — 6370000000 HC RX 637 (ALT 250 FOR IP): Performed by: ANESTHESIOLOGY

## 2024-02-22 PROCEDURE — 2580000003 HC RX 258: Performed by: ORTHOPAEDIC SURGERY

## 2024-02-22 PROCEDURE — 2500000003 HC RX 250 WO HCPCS: Performed by: NURSE ANESTHETIST, CERTIFIED REGISTERED

## 2024-02-22 PROCEDURE — 3600000004 HC SURGERY LEVEL 4 BASE: Performed by: ORTHOPAEDIC SURGERY

## 2024-02-22 PROCEDURE — 80048 BASIC METABOLIC PNL TOTAL CA: CPT

## 2024-02-22 PROCEDURE — 7100000011 HC PHASE II RECOVERY - ADDTL 15 MIN: Performed by: ORTHOPAEDIC SURGERY

## 2024-02-22 PROCEDURE — 3700000000 HC ANESTHESIA ATTENDED CARE: Performed by: ORTHOPAEDIC SURGERY

## 2024-02-22 PROCEDURE — 7100000010 HC PHASE II RECOVERY - FIRST 15 MIN: Performed by: ORTHOPAEDIC SURGERY

## 2024-02-22 PROCEDURE — 2709999900 HC NON-CHARGEABLE SUPPLY: Performed by: ORTHOPAEDIC SURGERY

## 2024-02-22 PROCEDURE — 3700000001 HC ADD 15 MINUTES (ANESTHESIA): Performed by: ORTHOPAEDIC SURGERY

## 2024-02-22 PROCEDURE — 64445 NJX AA&/STRD SCIATIC NRV IMG: CPT | Performed by: ANESTHESIOLOGY

## 2024-02-22 PROCEDURE — 2500000003 HC RX 250 WO HCPCS: Performed by: ANESTHESIOLOGY

## 2024-02-22 PROCEDURE — 85027 COMPLETE CBC AUTOMATED: CPT

## 2024-02-22 RX ORDER — VANCOMYCIN HYDROCHLORIDE 1 G/20ML
INJECTION, POWDER, LYOPHILIZED, FOR SOLUTION INTRAVENOUS PRN
Status: DISCONTINUED | OUTPATIENT
Start: 2024-02-22 | End: 2024-02-22 | Stop reason: HOSPADM

## 2024-02-22 RX ORDER — FAMOTIDINE 10 MG/ML
20 INJECTION, SOLUTION INTRAVENOUS ONCE
Status: COMPLETED | OUTPATIENT
Start: 2024-02-22 | End: 2024-02-22

## 2024-02-22 RX ORDER — PROPOFOL 10 MG/ML
INJECTION, EMULSION INTRAVENOUS PRN
Status: DISCONTINUED | OUTPATIENT
Start: 2024-02-22 | End: 2024-02-22 | Stop reason: SDUPTHER

## 2024-02-22 RX ORDER — MIDAZOLAM HYDROCHLORIDE 1 MG/ML
INJECTION INTRAMUSCULAR; INTRAVENOUS PRN
Status: DISCONTINUED | OUTPATIENT
Start: 2024-02-22 | End: 2024-02-22 | Stop reason: SDUPTHER

## 2024-02-22 RX ORDER — MAGNESIUM HYDROXIDE 1200 MG/15ML
LIQUID ORAL CONTINUOUS PRN
Status: DISCONTINUED | OUTPATIENT
Start: 2024-02-22 | End: 2024-02-22 | Stop reason: HOSPADM

## 2024-02-22 RX ORDER — SODIUM CHLORIDE 9 MG/ML
INJECTION, SOLUTION INTRAVENOUS PRN
Status: DISCONTINUED | OUTPATIENT
Start: 2024-02-22 | End: 2024-02-22 | Stop reason: HOSPADM

## 2024-02-22 RX ORDER — KETOROLAC TROMETHAMINE 30 MG/ML
INJECTION, SOLUTION INTRAMUSCULAR; INTRAVENOUS PRN
Status: DISCONTINUED | OUTPATIENT
Start: 2024-02-22 | End: 2024-02-22 | Stop reason: SDUPTHER

## 2024-02-22 RX ORDER — SODIUM CHLORIDE 0.9 % (FLUSH) 0.9 %
5-40 SYRINGE (ML) INJECTION PRN
Status: DISCONTINUED | OUTPATIENT
Start: 2024-02-22 | End: 2024-02-22 | Stop reason: HOSPADM

## 2024-02-22 RX ORDER — ROCURONIUM BROMIDE 10 MG/ML
INJECTION, SOLUTION INTRAVENOUS PRN
Status: DISCONTINUED | OUTPATIENT
Start: 2024-02-22 | End: 2024-02-22 | Stop reason: SDUPTHER

## 2024-02-22 RX ORDER — LIDOCAINE HYDROCHLORIDE 20 MG/ML
INJECTION, SOLUTION INFILTRATION; PERINEURAL PRN
Status: DISCONTINUED | OUTPATIENT
Start: 2024-02-22 | End: 2024-02-22 | Stop reason: SDUPTHER

## 2024-02-22 RX ORDER — CLINDAMYCIN PHOSPHATE 900 MG/50ML
900 INJECTION, SOLUTION INTRAVENOUS
Status: DISCONTINUED | OUTPATIENT
Start: 2024-02-22 | End: 2024-02-22 | Stop reason: ALTCHOICE

## 2024-02-22 RX ORDER — OXYCODONE HYDROCHLORIDE 5 MG/1
5 TABLET ORAL PRN
Status: DISCONTINUED | OUTPATIENT
Start: 2024-02-22 | End: 2024-02-22 | Stop reason: HOSPADM

## 2024-02-22 RX ORDER — MEPERIDINE HYDROCHLORIDE 50 MG/ML
12.5 INJECTION INTRAMUSCULAR; INTRAVENOUS; SUBCUTANEOUS EVERY 5 MIN PRN
Status: DISCONTINUED | OUTPATIENT
Start: 2024-02-22 | End: 2024-02-22 | Stop reason: HOSPADM

## 2024-02-22 RX ORDER — ONDANSETRON 2 MG/ML
4 INJECTION INTRAMUSCULAR; INTRAVENOUS ONCE
Status: DISCONTINUED | OUTPATIENT
Start: 2024-02-22 | End: 2024-02-22 | Stop reason: HOSPADM

## 2024-02-22 RX ORDER — CLINDAMYCIN PHOSPHATE 900 MG/50ML
INJECTION, SOLUTION INTRAVENOUS
Status: COMPLETED
Start: 2024-02-22 | End: 2024-02-22

## 2024-02-22 RX ORDER — DIPHENHYDRAMINE HYDROCHLORIDE 50 MG/ML
6.25 INJECTION INTRAMUSCULAR; INTRAVENOUS
Status: COMPLETED | OUTPATIENT
Start: 2024-02-22 | End: 2024-02-22

## 2024-02-22 RX ORDER — SODIUM CHLORIDE 0.9 % (FLUSH) 0.9 %
5-40 SYRINGE (ML) INJECTION EVERY 12 HOURS SCHEDULED
Status: DISCONTINUED | OUTPATIENT
Start: 2024-02-22 | End: 2024-02-22 | Stop reason: HOSPADM

## 2024-02-22 RX ORDER — KETAMINE HCL IN NACL, ISO-OSM 100MG/10ML
SYRINGE (ML) INJECTION PRN
Status: DISCONTINUED | OUTPATIENT
Start: 2024-02-22 | End: 2024-02-22 | Stop reason: SDUPTHER

## 2024-02-22 RX ORDER — OXYCODONE HYDROCHLORIDE 5 MG/1
10 TABLET ORAL PRN
Status: DISCONTINUED | OUTPATIENT
Start: 2024-02-22 | End: 2024-02-22 | Stop reason: HOSPADM

## 2024-02-22 RX ORDER — SODIUM CHLORIDE, SODIUM LACTATE, POTASSIUM CHLORIDE, CALCIUM CHLORIDE 600; 310; 30; 20 MG/100ML; MG/100ML; MG/100ML; MG/100ML
INJECTION, SOLUTION INTRAVENOUS CONTINUOUS
Status: DISCONTINUED | OUTPATIENT
Start: 2024-02-22 | End: 2024-02-22 | Stop reason: HOSPADM

## 2024-02-22 RX ORDER — PHENYLEPHRINE HCL IN 0.9% NACL 1 MG/10 ML
SYRINGE (ML) INTRAVENOUS PRN
Status: DISCONTINUED | OUTPATIENT
Start: 2024-02-22 | End: 2024-02-22 | Stop reason: SDUPTHER

## 2024-02-22 RX ORDER — MIDAZOLAM HYDROCHLORIDE 1 MG/ML
2 INJECTION INTRAMUSCULAR; INTRAVENOUS
Status: DISCONTINUED | OUTPATIENT
Start: 2024-02-22 | End: 2024-02-22 | Stop reason: HOSPADM

## 2024-02-22 RX ORDER — GLYCOPYRROLATE 0.2 MG/ML
INJECTION INTRAMUSCULAR; INTRAVENOUS PRN
Status: DISCONTINUED | OUTPATIENT
Start: 2024-02-22 | End: 2024-02-22 | Stop reason: SDUPTHER

## 2024-02-22 RX ADMIN — ROCURONIUM BROMIDE 50 MG: 50 INJECTION, SOLUTION INTRAVENOUS at 11:54

## 2024-02-22 RX ADMIN — OXYCODONE 10 MG: 5 TABLET ORAL at 14:56

## 2024-02-22 RX ADMIN — HYDROMORPHONE HYDROCHLORIDE 0.5 MG: 1 INJECTION, SOLUTION INTRAMUSCULAR; INTRAVENOUS; SUBCUTANEOUS at 14:17

## 2024-02-22 RX ADMIN — FAMOTIDINE 20 MG: 10 INJECTION, SOLUTION INTRAVENOUS at 10:29

## 2024-02-22 RX ADMIN — Medication 20 MG: at 12:12

## 2024-02-22 RX ADMIN — Medication 10 MG: at 13:30

## 2024-02-22 RX ADMIN — PROPOFOL 150 MG: 10 INJECTION, EMULSION INTRAVENOUS at 11:54

## 2024-02-22 RX ADMIN — SODIUM CHLORIDE, SODIUM LACTATE, POTASSIUM CHLORIDE, AND CALCIUM CHLORIDE: .6; .31; .03; .02 INJECTION, SOLUTION INTRAVENOUS at 11:46

## 2024-02-22 RX ADMIN — MIDAZOLAM 2 MG: 1 INJECTION INTRAMUSCULAR; INTRAVENOUS at 11:46

## 2024-02-22 RX ADMIN — HYDROMORPHONE HYDROCHLORIDE 0.5 MG: 1 INJECTION, SOLUTION INTRAMUSCULAR; INTRAVENOUS; SUBCUTANEOUS at 14:30

## 2024-02-22 RX ADMIN — Medication 200 MCG: at 12:08

## 2024-02-22 RX ADMIN — SUGAMMADEX 200 MG: 100 INJECTION, SOLUTION INTRAVENOUS at 13:39

## 2024-02-22 RX ADMIN — ROCURONIUM BROMIDE 20 MG: 50 INJECTION, SOLUTION INTRAVENOUS at 12:38

## 2024-02-22 RX ADMIN — CLINDAMYCIN PHOSPHATE 900 MG: 900 INJECTION, SOLUTION INTRAVENOUS at 12:01

## 2024-02-22 RX ADMIN — DIPHENHYDRAMINE HYDROCHLORIDE 6.5 MG: 50 INJECTION INTRAMUSCULAR; INTRAVENOUS at 14:42

## 2024-02-22 RX ADMIN — GLYCOPYRROLATE 0.2 MG: 0.2 INJECTION, SOLUTION INTRAMUSCULAR; INTRAVENOUS at 12:09

## 2024-02-22 RX ADMIN — LIDOCAINE HYDROCHLORIDE 60 MG: 20 INJECTION, SOLUTION INFILTRATION; PERINEURAL at 11:54

## 2024-02-22 RX ADMIN — KETOROLAC TROMETHAMINE 30 MG: 30 INJECTION, SOLUTION INTRAMUSCULAR; INTRAVENOUS at 13:20

## 2024-02-22 RX ADMIN — HYDROMORPHONE HYDROCHLORIDE 0.5 MG: 1 INJECTION, SOLUTION INTRAMUSCULAR; INTRAVENOUS; SUBCUTANEOUS at 14:56

## 2024-02-22 ASSESSMENT — PAIN DESCRIPTION - ORIENTATION
ORIENTATION: RIGHT

## 2024-02-22 ASSESSMENT — PAIN SCALES - GENERAL
PAINLEVEL_OUTOF10: 6
PAINLEVEL_OUTOF10: 7
PAINLEVEL_OUTOF10: 6
PAINLEVEL_OUTOF10: 3

## 2024-02-22 ASSESSMENT — ENCOUNTER SYMPTOMS: SHORTNESS OF BREATH: 1

## 2024-02-22 ASSESSMENT — PAIN DESCRIPTION - LOCATION
LOCATION: FOOT
LOCATION: FOOT
LOCATION: ANKLE
LOCATION: FOOT

## 2024-02-22 NOTE — DISCHARGE INSTRUCTIONS
Ice and elevate invoved lower extremity  No Wt bearing involved lower extremity. Use crutches or walker   Keep dressing clean and dry. Do not change dressing  Meds:Take pain meds and antibiotics as directed.   Take 325 mg of aspirin a day as directed  Follow up with Dr. Holliday in 10-14 days. Call if you do not have appointment [phone]  Loosen Ace PRN  Call for temp > 101.0, shortness of breath or uncontrolled pain

## 2024-02-22 NOTE — PROGRESS NOTES
Surgery Date and Time: 2/22/24 @ 11:30 am   Arrival Time:  09:30 am    The instructions given when and if a patient needs to stop oral intake prior to surgery varies. Follow the instructions you were given by your    Surgeon or RN during the Pre-op call.       __X__Nothing to eat or to drink after Midnight the night before the surgery. NO gum, mints, candy or ice chips day of surgery.                  Only take the following medications with a small sip of water the morning of surgery:  amlodipine, ranexa       Aspirin, Ibuprofen, Advil, Naproxen, Vitamin E and other Anti-inflammatory products and supplements should be stopped for 5 -7days before surgery     or as directed by your physician.       NO DIABETES MEDICATIONS DAY OF SURGERY                - If you take a long acting-insulin, please ask your Primary Care Physician if you should decrease your dose the night before surgery.    - Do not smoke or vape, and do not drink any alcoholic beverages 24 hours prior to surgery, this includes NA Beer. Refrain from using any recreational drugs,     including non-prescribed prescription drugs.     -You may brush your teeth and gargle the morning of surgery.  DO NOT SWALLOW WATER.    -You MUST plan for a responsible adult to stay on site while you are here and take you home after your surgery. You will not be allowed to leave alone or drive               yourself home. It is requested someone stay with you the first 24 hrs. Your surgery will be cancelled if you do not have a ride home with a responsible adult.    -A parent/legal guardian must accompany a child scheduled for surgery and plan to stay at the hospital until the child is discharged.  Please do not bring other                children with you.    -Please wear simple, loose-fitting clothing to the hospital. Do not bring valuables (money, credit cards, checkbooks, etc.) Do not wear any makeup (including                no eye makeup) and no nail polish if 
Anesthesia reviewed pre op EKG. Ok for surgery.   
Preprocedure completed, see epic. IV placed, consents verified. Belongings collected-clothing, glasses, and bilateral hearing aids labeled and collected.  
Pt brought to PACU. Report obtained from OR RN and anesthesia. Pt placed on monitor and O2 at  3L   
URINE PREG DOS __________  (__) SED RATE  ___________  (__) BLOOD SUGAR DOS __________  (__) C-REACTIVE PROTEIN ___________    (__) VITAMIN D HYDROXY ___________  (__) OAC    ________________________    (__) ACE/ ARBS: _____________________     (__) BETABLOCKERS __________________    Ride home/Contact #__________________________

## 2024-02-22 NOTE — ANESTHESIA POSTPROCEDURE EVALUATION
Department of Anesthesiology  Postprocedure Note    Patient: Maddi Sanford  MRN: [id number]  YOB: 1965  Date of evaluation: 2/22/2024    Procedure Summary       Date: 02/22/24 Room / Location: [address]    Anesthesia Start: 1146 Anesthesia Stop: 1357    Procedures:       RIGHT HEEL SUTURE ANCHOR REMOVAL, (Right: Ankle)      ACHILLES DEBRIDEMENT, FLEXOR HALLUCIS LONGUS TENDON TRANSFER (Right: Ankle) Diagnosis:       Tendon rupture, Achilles, right, initial encounter      (Tendon rupture, Achilles, right, initial encounter [S86.011A])    Surgeons: Pedro Holliday MD Responsible Provider: Gigi Barrios MD    Anesthesia Type: general, regional ASA Status: 3            Anesthesia Type: No value filed.    Angel Phase I: Angel Score: 8    Angel Phase II:      Anesthesia Post Evaluation    Comments: Anes Post-op Note    Name:    Maddi Sanford  MRN:      [id number]    Patient Vitals in the past 12 hrs:  02/22/24 1359, Pulse:82  02/22/24 1357, Temp:97.7 °F (36.5 °C), Temp src:Temporal, Pulse:84, Resp:21, SpO2:93 %  02/22/24 1000, BP:(!) 148/87, Temp:97.8 °F (36.6 °C), Temp src:Oral, Pulse:85, Resp:16, SpO2:97 %     LABS:    CBC  Lab Results       Component                Value               Date/Time                  WBC                      5.3                 02/22/2024 10:27 AM        HGB                      14.3                02/22/2024 10:27 AM        HCT                      42.6                02/22/2024 10:27 AM        PLT                      267                 02/22/2024 10:27 AM   RENAL  Lab Results       Component                Value               Date/Time                  NA                       140                 02/22/2024 10:27 AM        K                        3.5                 02/22/2024 10:27 AM        K                        3.5                 02/22/2024 10:27 AM        CL                       103                 02/22/2024 10:27 AM        CO2

## 2024-02-22 NOTE — ANESTHESIA PROCEDURE NOTES
Peripheral Block    Patient location during procedure: pre-op  Reason for block: post-op pain management and at surgeon's request  Start time: 2/22/2024 11:09 AM  End time: 2/22/2024 11:15 AM  Staffing  Performed: anesthesiologist   Anesthesiologist: Gigi Barrios MD  Performed by: Gigi Barrios MD  Authorized by: Gigi Barrios MD    Preanesthetic Checklist  Completed: patient identified, IV checked, site marked, risks and benefits discussed, surgical/procedural consents, equipment checked, pre-op evaluation, timeout performed, anesthesia consent given, oxygen available, monitors applied/VS acknowledged, fire risk safety assessment completed and verbalized and blood product R/B/A discussed and consented  Peripheral Block   Patient position: left lateral decubitus  Prep: ChloraPrep  Provider prep: sterile gloves  Patient monitoring: continuous pulse ox, IV access and oxygen  Block type: Sciatic  Infragluteal  Laterality: right  Injection technique: single-shot  Guidance: nerve stimulator    Needle   Needle type: insulated echogenic nerve stimulator needle   Needle gauge: 21 G  Needle localization: nerve stimulator  Test dose: negative  Needle length: 10 cm  Assessment   Injection assessment: negative aspiration for heme, no paresthesia on injection and no intravascular symptoms  Paresthesia pain: none  Slow fractionated injection: yes  Hemodynamics: stableno  Outcomes: uncomplicated and patient tolerated procedure well    Additional Notes  BPV 0.25% 20cc in divided doses    IVS:  Propofol    mg

## 2024-02-22 NOTE — BRIEF OP NOTE
Brief Postoperative Note      Patient: Maddi Sanford  YOB: 1965  MRN: [id number]    Date of Procedure: 2/22/2024    Pre-Op Diagnosis Codes:     * Tendon rupture, Achilles, right, initial encounter [S86.011A]    Post-Op Diagnosis: Same       Procedure(s):  RIGHT HEEL SUTURE ANCHOR REMOVAL,  ACHILLES DEBRIDEMENT, FLEXOR HALLUCIS LONGUS TENDON TRANSFER VERSUS TENDON ALLOGRAFT WITH MINI C-ARM                                ARTHREX NOTE:  BLOCK    Surgeon(s):  Pedro Holliday MD    Assistant:  Surgical Assistant: Madelyn Reed    Anesthesia: General    Estimated Blood Loss (mL): Minimal    Complications: None    Specimens:   * No specimens in log *    Implants:  * No implants in log *      Drains: * No LDAs found *    Findings: right achilles rupture retained HW      Electronically signed by Pedro Holliday MD on 2/22/2024 at 11:29 AM

## 2024-02-22 NOTE — ANESTHESIA PRE PROCEDURE
Department of Anesthesiology  Preprocedure Note       Name:  Maddi Sanford   Age:  58 y.o.  :  1965                                          MRN:  [id number]         Date:  2024      Surgeon: Surgeon(s):  Pedro Holliday MD    Procedure: Procedure(s):  RIGHT HEEL SUTURE ANCHOR REMOVAL,  ACHILLES DEBRIDEMENT, FLEXOR HALLUCIS LONGUS TENDON TRANSFER VERSUS TENDON ALLOGRAFT WITH MINI C-ARM                                ARTHREX NOTE:  BLOCK    Medications prior to admission:   Prior to Admission medications    Medication Sig Start Date End Date Taking? Authorizing Provider   promethazine (PHENERGAN) 25 MG tablet Take 1 tablet by mouth every 6 hours as needed for Nausea 23   Ranjana Fuller MD   Cholecalciferol (VITAMIN D3) 50 MCG ( UT) CAPS Take 1,000 Units by mouth daily    Jayla Kingston MD   empagliflozin (JARDIANCE) 10 MG tablet Take 1 tablet by mouth daily 10/16/23   Dave Bhatia MD   amLODIPine (NORVASC) 5 MG tablet Take 1 tablet by mouth daily 10/16/23   Dave Bhatia MD   ranolazine (RANEXA) 500 MG extended release tablet Take 1 tablet by mouth 2 times daily 10/16/23   Dave Bhatia MD   Ibuprofen 800 MG/200ML SOLN Take 800 mg by mouth daily as needed (pain)    Jayla Kingston MD   CRANBERRY PO Take by mouth 30,000, 2 tablets daily    Jayla Kingston MD   nitroGLYCERIN (NITROSTAT) 0.4 MG SL tablet Place 1 tablet under the tongue every 5 minutes as needed for Chest pain No more than 3 tabs in a 15 minute period. 5/10/23   Dave Bhatia MD   pravastatin (PRAVACHOL) 40 MG tablet Take 1 tablet by mouth daily 22   Jayla Kingston MD   senna (SENOKOT) 8.6 MG tablet Take 1 tablet by mouth as needed for Constipation 22   Jayla Kingston MD   diazePAM (VALIUM) 5 MG tablet Take 1 tablet by mouth as needed for Anxiety. 10/11/22   Jayla Kingston MD   triamterene-hydrochlorothiazide (MAXZIDE-25) 37.5-25 MG per tablet Take 1 tablet by

## 2024-02-23 NOTE — OP NOTE
exsanguinated and tourniquet inflated to 300 mmHg.  The right lower extremity was then approached through a previous incision which was excised on the anteromedial aspect of the Achilles insertion.  It was taken approximately 7 to 10 cm.  This was well above the palpable retraction of the Achilles.  This was carried down through the subcutaneous tissue with Metzenbaum scissors.  The proximal extent of the tendon was identified and then debrided using Metzenbaum scissors with care taken not to damage the sural nerve.  The tendon was freed up from adhesions, both digitally and using Metzenbaum scissors until the tendon was mobile.  It was still noted to be contractile.  The distal 1.5 cm of segment distal stump was excised using a 15 blade.  At this point, the deep fascia was incised in line with the Achilles tendon and muscle belly of the FHL was identified and followed distally to the tendon, which was then dissected down to the fibro-osseous tunnel in the posterolateral aspects, so as to stay away from the neurovascular bundle.  The fascia overlying the FHL muscle belly was transected proximally to prevent impingement during transfer.  A medial incision was then made extending from the navicular tuberosity to the junction of middle and distal third of the first metatarsal base.  This was carried down through the subcutaneous tissues with tenotomy scissors.  The avascular plane between the abductor hallucis and the first metatarsal base was developed using a Bovie.  A small incision was made in the muscle and FHL muscle belly was easily identified, dissected proximally to the master knot of Dev where it was freed up from the FDL tendon.  The FHL tendon was then transected distally as far as possible and a 2-0 Vicryl whipstitch placed.  It was delivered to the posterior wound without difficulty.  The tendon was then passed in Pulvertaft weave fashion through the remaining distal stump of the Achilles and eventually

## 2024-04-25 ENCOUNTER — OFFICE VISIT (OUTPATIENT)
Dept: CARDIOLOGY CLINIC | Age: 59
End: 2024-04-25

## 2024-04-25 VITALS
HEIGHT: 65 IN | BODY MASS INDEX: 42.65 KG/M2 | OXYGEN SATURATION: 98 % | SYSTOLIC BLOOD PRESSURE: 118 MMHG | WEIGHT: 256 LBS | HEART RATE: 88 BPM | DIASTOLIC BLOOD PRESSURE: 82 MMHG

## 2024-04-25 DIAGNOSIS — I10 HYPERTENSION, UNSPECIFIED TYPE: ICD-10-CM

## 2024-04-25 DIAGNOSIS — G47.33 OBSTRUCTIVE SLEEP APNEA SYNDROME: ICD-10-CM

## 2024-04-25 DIAGNOSIS — I50.32 CHRONIC DIASTOLIC HEART FAILURE (HCC): ICD-10-CM

## 2024-04-25 DIAGNOSIS — E66.01 CLASS 3 SEVERE OBESITY DUE TO EXCESS CALORIES WITH SERIOUS COMORBIDITY AND BODY MASS INDEX (BMI) OF 40.0 TO 44.9 IN ADULT (HCC): ICD-10-CM

## 2024-04-25 DIAGNOSIS — I20.1 PRINZMETAL ANGINA (HCC): Primary | ICD-10-CM

## 2024-04-25 NOTE — PATIENT INSTRUCTIONS
Follow up with Dr Bhatia in 1 month     Labs- have PCP do a Lipid panel and check kidney function due to diuretics.     Start:   New Prescriptions    SEMAGLUTIDE-WEIGHT MANAGEMENT (WEGOVY) 0.25 MG/0.5ML SOAJ SC INJECTION    Inject 0.25 mg into the skin every 7 days     Call for any questions or concerns.

## 2024-04-25 NOTE — PROGRESS NOTES
Duplicate please disregard    
identified. Global EF = 60 %.   ________________________________________________________________________  FINAL IMPRESSION:  1. Angiographically normal epicardial coronary arteries.   2. Normal LV systolic function, EF =60%.      Calcium score   CT CARDIAC CALCIUM SCORING 06/02/2023  6:53 AM (Final)  HISTORY:Screening for heart disease.    FINDINGS:  LEFT MAIN: Zero (0).  LEFT ANTERIOR DESCENDING: Zero (0).  CIRCUMFLEX: Zero (0).  RIGHT CORONARY ARTERY: Zero (0).  TOTAL AGATSTON CALCIUM SCORE: Zero (0).  EXTRACARDIAC STRUCTURES: No evidence of mediastinal or hilar lymphadenopathy.  No pericardial effusion.  No cardiomegaly.  No evidence of acute process in  the lungs.  No noncalcified nodules are noted in the lungs.  Prior  cholecystectomy.  Limited images of the upper abdomen are otherwise grossly  unremarkable.  Visualized osseous structures demonstrate age related  degenerative changes without acute abnormality.    Impression  Total Agatston calcium score of zero (0) implies a very low likelihood of a  cardiac event over at least the next 3 years.  Total calcium score of 0 is in the 0th percentile for the patient's age of 58 y/o .  No incidental clinically important extracardiac CT findings.      Coronary CTA   No results found for this or any previous visit.    CARDIAC RHYTHM ASSESSMENT:  Holter/Event monitor  No results found for this or any previous visit.    EP studies / cardioversions   No results found for this or any previous visit.    ADDITIONAL IMAGING:   XR CHEST PORTABLE  11/10/2023 12:18 pm  Reason for Exam: chest pain    FINDINGS:  Normal cardiomediastinal silhouette.  No acute airspace infiltrate.  No  pneumothorax or pleural effusion    Impression  No acute cardiopulmonary findings    Outside/Care everywhere records Reviewed  Labs Reviewed  Prior Imaging, ekg, cath, echo reviewed when available  Medications reviewed  Old Notes reviewed  ASSESSMENT AND PLAN     Encounter Diagnoses   Name Primary?

## 2024-04-30 NOTE — TELEPHONE ENCOUNTER
Pt called and states [facility] advised her that they do not fill WEGOVY. They are asking for a new script for Compound which is like WEGOVY but they make their own per pt. Pt asking for call to advise when Rx is placed so she knows when she can expect to pick it up. Pt number is [phone]. Please advise. Thank you.

## 2024-05-02 NOTE — TELEPHONE ENCOUNTER
[facility] calling to see if the Wegovy compound that was sent in for a refill can have B12 included?  They can take a verbal or a new RX can be sent over and in the \"notes section\"  put  compound with B12..

## 2024-05-03 NOTE — TELEPHONE ENCOUNTER
Pt calling, pharmacist informed her that if she has thyroid problems, she should not take Wegovy, and pt is being watched for thyroid nodules.    She is asking if she should get the script filled, or not begin the medicine.    She would like to talk with someone about it, if we can call her on [phone].    Please advise

## 2024-05-06 ENCOUNTER — TELEPHONE (OUTPATIENT)
Dept: CARDIOLOGY CLINIC | Age: 59
End: 2024-05-06

## 2024-05-06 NOTE — TELEPHONE ENCOUNTER
Spoke to patient about her concerns. She is going to wait until she has her next scan in September to decide if she would like to take the Wegovy. Appt canceled and pt will call to reschedule in 6 months per [facility]. No further questions at this time.

## 2024-05-06 NOTE — TELEPHONE ENCOUNTER
Pt calling back, left message 5/3    Pharmacist informed her that if she has thyroid problems, she should not take Wegovy, and pt is being watched for thyroid nodules.     She is asking if she should get the script filled, or not begin the medicine.     She would like to talk with someone about it, if we can call her on [phone].     Please advise

## 2024-05-13 ENCOUNTER — TELEPHONE (OUTPATIENT)
Dept: ORTHOPEDIC SURGERY | Age: 59
End: 2024-05-13

## 2024-05-13 NOTE — TELEPHONE ENCOUNTER
Other PATIENT CALLED IN INQUIRING IF SHE COULD HAVE A MED REFILL OR IF SHE WOULD NEED TO BE SEEN IN THE OFFICE FIRST SHE JUST HAD LUCIUS SX 2-22-24  STATES IT IS PRETTY HARD TO GET AROUND STILL    PLEASE CALL [phone]

## 2024-05-15 ENCOUNTER — TELEPHONE (OUTPATIENT)
Dept: ORTHOPEDIC SURGERY | Age: 59
End: 2024-05-15

## 2024-05-15 NOTE — TELEPHONE ENCOUNTER
Prescription Refill     Medication Name:  PAIN MED AND MUSCLE RELAXER  Pharmacy: [facility] [address] [place]  Patient Contact Number:  [phone]       The pt is requesting a refill on her pain med and her muscle relaxer.  She goes to [facility] on [address].  She's having a hard time walking after her achilles sx

## 2024-05-20 PROBLEM — G43.009 MIGRAINE WITHOUT AURA AND WITHOUT STATUS MIGRAINOSUS, NOT INTRACTABLE: Status: ACTIVE | Noted: 2017-10-20

## 2024-05-20 PROBLEM — H81.01 MENIERE'S DISEASE, RIGHT: Status: ACTIVE | Noted: 2018-08-15

## 2024-05-20 PROBLEM — F41.9 ANXIETY AND DEPRESSION: Status: ACTIVE | Noted: 2023-08-11

## 2024-05-20 PROBLEM — M79.7 PRIMARY FIBROMYALGIA SYNDROME: Status: ACTIVE | Noted: 2023-11-02

## 2024-05-20 PROBLEM — R13.19 ESOPHAGEAL DYSPHAGIA: Status: ACTIVE | Noted: 2018-06-13

## 2024-05-20 PROBLEM — M51.36 DEGENERATIVE DISC DISEASE, LUMBAR: Status: ACTIVE | Noted: 2020-06-08

## 2024-05-20 PROBLEM — M54.2 CHRONIC NECK AND BACK PAIN: Status: ACTIVE | Noted: 2024-05-20

## 2024-05-20 PROBLEM — H91.93 HEARING IMPAIRED PERSON, BILATERAL: Status: ACTIVE | Noted: 2024-05-20

## 2024-05-20 PROBLEM — R50.82 POSTPROCEDURAL FEVER: Status: ACTIVE | Noted: 2023-12-01

## 2024-05-20 PROBLEM — M17.12 PRIMARY OSTEOARTHRITIS OF LEFT KNEE: Status: ACTIVE | Noted: 2020-11-24

## 2024-05-20 PROBLEM — M76.60 ACHILLES TENDINITIS: Status: ACTIVE | Noted: 2023-11-20

## 2024-05-20 PROBLEM — M15.9 OSTEOARTHRITIS INVOLVING MULTIPLE JOINTS ON BOTH SIDES OF BODY: Status: ACTIVE | Noted: 2024-05-20

## 2024-05-20 PROBLEM — F51.01 PRIMARY INSOMNIA: Status: ACTIVE | Noted: 2017-07-19

## 2024-05-20 PROBLEM — M79.7 FIBROMYALGIA: Status: ACTIVE | Noted: 2017-07-19

## 2024-05-20 PROBLEM — M51.369 DEGENERATIVE DISC DISEASE, LUMBAR: Status: ACTIVE | Noted: 2020-06-08

## 2024-05-20 PROBLEM — M54.9 CHRONIC NECK AND BACK PAIN: Status: ACTIVE | Noted: 2024-05-20

## 2024-05-20 PROBLEM — R73.03 PREDIABETES: Status: ACTIVE | Noted: 2024-01-15

## 2024-05-20 PROBLEM — F32.A ANXIETY AND DEPRESSION: Status: ACTIVE | Noted: 2023-08-11

## 2024-05-20 PROBLEM — R30.0 DYSURIA: Status: ACTIVE | Noted: 2024-03-12

## 2024-05-20 PROBLEM — S86.011A STRAIN OF ACHILLES TENDON, RIGHT, INITIAL ENCOUNTER: Status: ACTIVE | Noted: 2024-02-20

## 2024-05-20 PROBLEM — S86.011A RUPTURE OF RIGHT ACHILLES TENDON: Status: ACTIVE | Noted: 2024-02-07

## 2024-05-20 PROBLEM — G89.29 CHRONIC NECK AND BACK PAIN: Status: ACTIVE | Noted: 2024-05-20

## 2024-05-20 PROBLEM — M17.11 PRIMARY OSTEOARTHRITIS OF RIGHT KNEE: Status: ACTIVE | Noted: 2020-10-07

## 2024-05-20 PROBLEM — K59.09 CHRONIC CONSTIPATION: Status: ACTIVE | Noted: 2018-10-15

## 2024-05-20 PROBLEM — M92.8 JUVENILE OSTEOCHONDROSIS OF FOOT: Status: ACTIVE | Noted: 2023-09-16

## 2024-07-02 ENCOUNTER — PROCEDURE VISIT (OUTPATIENT)
Dept: AUDIOLOGY | Age: 59
End: 2024-07-02
Payer: COMMERCIAL

## 2024-07-02 ENCOUNTER — OFFICE VISIT (OUTPATIENT)
Dept: ENT CLINIC | Age: 59
End: 2024-07-02
Payer: COMMERCIAL

## 2024-07-02 VITALS
BODY MASS INDEX: 46.48 KG/M2 | WEIGHT: 279 LBS | OXYGEN SATURATION: 94 % | HEART RATE: 86 BPM | DIASTOLIC BLOOD PRESSURE: 96 MMHG | TEMPERATURE: 97.3 F | HEIGHT: 65 IN | SYSTOLIC BLOOD PRESSURE: 140 MMHG

## 2024-07-02 DIAGNOSIS — H90.3 SENSORINEURAL HEARING LOSS (SNHL) OF BOTH EARS: Primary | ICD-10-CM

## 2024-07-02 DIAGNOSIS — H81.01 MENIERE'S DISEASE (COCHLEAR HYDROPS), RIGHT: Primary | ICD-10-CM

## 2024-07-02 DIAGNOSIS — H93.11 TINNITUS OF RIGHT EAR: ICD-10-CM

## 2024-07-02 DIAGNOSIS — H90.A21 SENSORINEURAL HEARING LOSS (SNHL) OF RIGHT EAR WITH RESTRICTED HEARING OF LEFT EAR: ICD-10-CM

## 2024-07-02 DIAGNOSIS — H90.3 ASYMMETRIC SNHL (SENSORINEURAL HEARING LOSS): ICD-10-CM

## 2024-07-02 DIAGNOSIS — D10.39 SQUAMOUS PAPILLOMA OF SOFT PALATE: ICD-10-CM

## 2024-07-02 PROCEDURE — 3077F SYST BP >= 140 MM HG: CPT | Performed by: STUDENT IN AN ORGANIZED HEALTH CARE EDUCATION/TRAINING PROGRAM

## 2024-07-02 PROCEDURE — 92567 TYMPANOMETRY: CPT

## 2024-07-02 PROCEDURE — 92557 COMPREHENSIVE HEARING TEST: CPT

## 2024-07-02 PROCEDURE — 3080F DIAST BP >= 90 MM HG: CPT | Performed by: STUDENT IN AN ORGANIZED HEALTH CARE EDUCATION/TRAINING PROGRAM

## 2024-07-02 PROCEDURE — 99214 OFFICE O/P EST MOD 30 MIN: CPT | Performed by: STUDENT IN AN ORGANIZED HEALTH CARE EDUCATION/TRAINING PROGRAM

## 2024-07-02 NOTE — PROGRESS NOTES
Maddi Sanford   1965, 58 y.o. female   [id number]       Referring Provider: Tanmay Feliz DO  Referral Type: In an order in Epic    Reason for Visit: Evaluation of suspected change in hearing, tinnitus, or balance.    ADULT AUDIOLOGIC EVALUATION      Maddi Sanford is a 58 y.o. female seen today, 7/2/2024 , for an initial audiologic evaluation.  Patient was seen by Tanmay Feliz DO following today's evaluation.    AUDIOLOGIC AND OTHER PERTINENT MEDICAL HISTORY:      Maddi Sanford reports a long-standing history of hearing loss, right worse than left. She noted that she was previous diagnosed with Meniere's Disease in her right ear for which she had a shunt placed. Patient arrived wearing hearing aids that are 8-10 years old. She noted that she struggles to hear in both ears with the hearing aids, so she believes her hearing is worsening. Patient reported a constant tinnitus in the right ear that is bothersome. She reported that her last vertigo attack was about a year a go. Patient has a family history of age-related hearing loss.     She denied otalgia, aural fullness, otorrhea, history of noise exposure, history of head trauma, and history of ear surgery    Date: 7/2/2024     IMPRESSIONS:      Today's results revealed a Moderately-Severe sloping to Severe Sensorineural hearing loss in the right ear and a Normal sloping to Severe rising to Moderately-Severe Sensorineural hearing loss in the left ear. Excellent speech understanding when in quiet in the left ear; poor in the right ear. Tympanometry indicates normal middle ear function. Discussed test results and implications with patient. Discussed possible benefits of amplification.  Discussed use of tinnitus management strategies. Provided basic tinnitus education, including the neurophysiological model.    A listening check of her hearing aids revealed no function of the right device and very little function of the left device. Recommended returning

## 2024-07-02 NOTE — PATIENT INSTRUCTIONS
of your treatment and safety.     Be sure to make and go to all appointments, and call your doctor if you are having problems. It's also a good idea to know your test results and keep a list of the medicines you take.    How can you care for yourself at home?    If your doctor recommends or prescribes medicine, take it exactly as directed. Call your doctor if you think you are having a problem with your medicine.  Do not drive while you feel dizzy.  Try to prevent falls. Steps you can take include:  Using nonskid mats, adding grab bars near the tub, and using night-lights.  Clearing your home so that walkways are free of anything you might trip on.  Letting family and friends know that you have been feeling dizzy. This will help them know how to help you.    When should you call for help?  Call 911 anytime you think you may need emergency care. For example, call if:    You passed out (lost consciousness).     You have dizziness along with symptoms of a heart attack. These may include:  Chest pain or pressure, or a strange feeling in the chest.  Sweating.  Shortness of breath.  Nausea or vomiting.  Pain, pressure, or a strange feeling in the back, neck, jaw, or upper belly or in one or both shoulders or arms.  Lightheadedness or sudden weakness.  A fast or irregular heartbeat.     You have symptoms of a stroke. These may include:  Sudden numbness, tingling, weakness, or loss of movement in your face, arm, or leg, especially on only one side of your body.  Sudden vision changes.  Sudden trouble speaking.  Sudden confusion or trouble understanding simple statements.  Sudden problems with walking or balance.  A sudden, severe headache that is different from past headaches.    Call your doctor now or seek immediate medical care if:    You feel dizzy and have a fever, headache, or ringing in your ears.     You have new or increased nausea and vomiting.     Your dizziness does not go away or comes back.    Watch closely

## 2024-07-02 NOTE — PROGRESS NOTES
[facility]  DIVISION OF OTOLARYNGOLOGY- HEAD & NECK SURGERY  CLINIC FOLLOW-UP VISIT      Patient Name: Maddi Sanford  Medical Record Number:  [id number]  Primary Care Physician:  Alma Delia Izaguirre MD    ChiefComplaint     Chief Complaint   Patient presents with    Follow-up     F/u after hearing eval,        History of Present Illness     Maddi Sanford is an 58 y.o. female previously seen for squamous papilloma of soft palate status post excision on 11/10/2023, multinodular thyroid, right Ménière's disease, right hearing loss    Interval History:   No issues after oral surgery.     Feels like hearing loss has worsened gradually. Hearing aids are 10 years old. Hx of right sided menieres diagnosed 2012. No recent vertigo for the past 1 year. Takes valium and phenergan as needed. On Maxide daily. Hx of right sided shunt placed by Dr. Jessica around 2012 - no hx of other ear surgery. Follows a low salt diet. Constant right sided ear ringing.     Past Medical History     Past Medical History:   Diagnosis Date    Arthritis     Asthma     resolved, no meds    Depression     Edema of right lower leg     Fibromyalgia     Ganglion cyst     GERD (gastroesophageal reflux disease)     H/O drug overdose 2002    suicide attempt, cardiac arrest    Headache(784.0)     Hyperlipemia     Hypertension     IBS (irritable bowel syndrome)     Meniere's disease     Migraines     Palpitations     PONV (postoperative nausea and vomiting)     Prinzmetal angina (HCC)     Sleep apnea     cpap, does not use    Syncope     Thyroid nodule     TIA (transient ischemic attack)     pt states 30 yrs ago    Vertigo     Wears hearing aid     bilateral hearing aids       Past Surgical History     Past Surgical History:   Procedure Laterality Date    ABDOMEN SURGERY      ACHILLES TENDON SURGERY Right 11/20/2023    RIGHT INSERTIONAL ACHILLES TENDONITIS WITH CALCANEUS PARTIAL EXCISION WITH SECONDARY REPAIR OF ACHILLES TENDON performed by [facility],

## 2024-08-05 NOTE — PROGRESS NOTES
Maddi Sanford   1965, 59 y.o. female  [id number]       HEARING AID EVALUATION    Maddi Sanford was seen today, 8/6/2024 for a Hearing Aid Evaluation, accompanied by her .  Patient has prior history of hearing aid use, currently wearing [employer] THOMPSON devices fit at [facility] around 9 years ago.  Otoscopy revealed: Clear ear canals bilaterally.  Used clinic demo Phonak Audeo L90-RL devices and a ReSound Nexia 9 + CROS during today's appointment.    INSURANCE:  Her insurance was contacted and she does not have a benefit for hearing aids (please see scanned insurance verification worksheet in Media tab).  Discount through [employer] only; provided patient with phone number given during benefit check.    LIFESTYLE EVALUATION:       Primary listening situations Maddi Sanford would like to improve:  Awareness/sound on right side  Clarity of voices, especially when not looking at her (from rear or right)  General conversation, especially in background noise    Other pertinent lifestyle needs: She is a retired teacher; stopped working due to difficulty hearing.    Phone connectivity:  Interested in fercho/streaming.    Patient preference to binaural hearing vs BiCROS after trial and discussion today.    DEVICE SELECTION:       After a discussion of the various styles, technology levels, and features of available in hearing aid technology in relation to his lifestyle needs, Maddi Sanford has decided to consider the options and contact Audiology when a decision has been made..      Technology to be considered: (2) Phonak Audeo, 1M receivers, medium vented/power domes, color P1      Patient cost due at time of fitting: $TBD.  Quoted all technology levels respectively.  Patient noted she qualifies for financial assistance through [employer] and would like to know if that has an impact on her responsibility; will discuss with audiology manager and PM to determine what that may mean for her.      PATIENT

## 2024-08-06 ENCOUNTER — PROCEDURE VISIT (OUTPATIENT)
Dept: AUDIOLOGY | Age: 59
End: 2024-08-06

## 2024-08-06 DIAGNOSIS — H90.3 SENSORINEURAL HEARING LOSS (SNHL) OF BOTH EARS: Primary | ICD-10-CM

## 2024-08-15 ENCOUNTER — TELEPHONE (OUTPATIENT)
Dept: ENT CLINIC | Age: 59
End: 2024-08-15

## 2024-08-15 NOTE — TELEPHONE ENCOUNTER
Pt was here last week for HA evaluation -- she stated that you were going to look into a few different options for her and call her back in a few days. She is calling in to see if you have any information for her.

## 2024-08-19 NOTE — TELEPHONE ENCOUNTER
Per verbal conversation with [facility] medicine increased to 0.5mg weekly and sent to  pharmacy. Spoke to pt and advised that it is not unusual to see small amount of weight loss in the beginning and more when we get to the full dose but will need to increase slowly to avoid side effects. Pt v/u and agreeable to plan. No further questions at this time.

## 2024-09-04 ENCOUNTER — TELEPHONE (OUTPATIENT)
Dept: ENT CLINIC | Age: 59
End: 2024-09-04

## 2024-10-01 ENCOUNTER — TELEPHONE (OUTPATIENT)
Dept: CARDIOLOGY CLINIC | Age: 59
End: 2024-10-01

## 2024-10-01 RX ORDER — RANOLAZINE 500 MG/1
500 TABLET, EXTENDED RELEASE ORAL 2 TIMES DAILY
Qty: 180 TABLET | Refills: 3 | Status: SHIPPED | OUTPATIENT
Start: 2024-10-01

## 2024-10-01 NOTE — TELEPHONE ENCOUNTER
Rx Refill please send to [facility]    ranolazine (RANEXA) 500 MG extended release tablet   500 MG  90 day supply   Take 1 tablet by mouth BID     [facility] - [place] - [address] [facility] - P [phone] - F [phone]

## 2024-10-01 NOTE — TELEPHONE ENCOUNTER
See other telephone encounter from 10/1/2024- encounter under wrong name- unable to send script to [facility] to have filled.

## 2024-10-01 NOTE — TELEPHONE ENCOUNTER
Received refill request for Ranexa from [facility] pharmacy.     Last OV: 4/25/2024 [facility]    Next OV: 10/15/2024 [facility]    Last Labs:     Last Filled: 10/16/2023 [facility]

## 2024-10-02 ENCOUNTER — PROCEDURE VISIT (OUTPATIENT)
Dept: AUDIOLOGY | Age: 59
End: 2024-10-02

## 2024-10-02 DIAGNOSIS — H90.3 SENSORINEURAL HEARING LOSS, BILATERAL: Primary | ICD-10-CM

## 2024-10-02 NOTE — PROGRESS NOTES
Maddi Sanford   1965, 59 y.o. female   [id number]     HEARING AID FITTING      RIGHT EAR: /MODEL: Phonak P70-R  SN: [id number]  EARMOLD/TUBING/WIRE/DOME: 1M small power    LEFT EAR: /MODEL: Phonak P70-R  SN: [id number]  EARMOLD/TUBING/WIRE/DOME: 1M small vented    ACCESSORIES:none  HAF: 10/2/24  WARRANTY: 27  TRIAL PERIOD ENDS:24  L&D ELIGIBLE: Yes    BUTTONS: ENABLED  PROGRAMS: ENABLED  PHONE CONNECTIVITY: DISABLED, patient left phone at home    Maddi Sanford was seen today,10/2/2024,  to be fit with Phonak hearing aids.  Hearing aids were programmed to 100% global tuning. Real ear measurements completed today. All targets for soft, medium, and loud speech were met according to NAL-NL2 targets.    Device orientation was completed, includin. Hearing aid insertion/removal   2. Buttons/Indicators   3. Rechargeable battery use and life expectancy     4.  insertion/removal     5. Cleaning/maintenance of the device     6. Loss & Damage/Repair warranty information   7. 30-day right-to-return period    Maddi Sanford noted excellent sound quality. She stated she felt like she could cry now that she could hear out of her right ear. Patient noted some echoic sound quality in the right ear, but counseled on adjustment period since she has never worn amplification on that side. It was recommended that patient return for a follow-up appointment within the 30-day right-to-return period for further programming adjustments and education of the devices as warranted.    Patient left her phone at home and will try to pair to Bluetooth and fercho at home. Advised she can wait till next follow up if she prefers.     Upon observation, fit of domes and  length seemed slightly too big. Size 0M lenth receivers to be ordered. Also briefly discussed option to add on custom ear molds for an additional $250 total. Advised patient to wear devices these next couple of weeks and let

## 2024-10-08 NOTE — PROGRESS NOTES
MRI of Brain WO Contrast: 9/14/24  Indication: Memory loss  Impression:   1.  No acute intracranial abnormality. No evidence of acute infarct.   2.  No definite specific pattern for memory loss/dementia identified on this exam.     XR CHEST PORTABLE  11/10/2023 12:18 pm  Reason for Exam: chest pain    FINDINGS:  Normal cardiomediastinal silhouette.  No acute airspace infiltrate.  No  pneumothorax or pleural effusion    Impression  No acute cardiopulmonary findings    Outside/Care everywhere records Reviewed  Labs Reviewed  Prior Imaging, ekg, cath, echo reviewed when available  Medications reviewed  Old Notes reviewed  ASSESSMENT AND PLAN     Encounter Diagnoses   Name Primary?    JOYNER (dyspnea on exertion)     Palpitations     Racing heart beat     Dizziness     Prinzmetal angina (HCC) Yes    Diastolic dysfunction     Class 3 severe obesity with body mass index (BMI) of 45.0 to 49.9 in adult, unspecified obesity type, unspecified whether serious comorbidity present     Obstructive sleep apnea syndrome     Essential hypertension        JOYNER/palpitations - JOYNER might be due to worsening diastolic function, but symptoms are suspicious for arrhythmia.  Will get 30 day monitor to evaluate for AF.  Start metoprolol XL 25 daily (patient instructed to wait a few days of wearing the monitor prior to starting this).  Will get labs including BNP and iron studies to evaluate for other causes of dyspnea, as well as a new echocardiogram.  Prinzmetal - doing well, Continue current medications.  Amlodipine/Ranexa combo appears to be working well.  PRN nitro for severe symptoms.  Continue primary prevention against CAD  Obesity - counseled about need for weight loss.  She was not able to tolerate the Semaglutide due to side effect of constipation.  PCP started on Wellbutrin  mg daily for anxiety/depression.  I personally spoke to Dr. Feliz today in regard to her thyroid nodules.  The nodules are small and not likely

## 2024-10-15 ENCOUNTER — OFFICE VISIT (OUTPATIENT)
Dept: CARDIOLOGY CLINIC | Age: 59
End: 2024-10-15
Payer: COMMERCIAL

## 2024-10-15 VITALS
BODY MASS INDEX: 44.32 KG/M2 | DIASTOLIC BLOOD PRESSURE: 82 MMHG | SYSTOLIC BLOOD PRESSURE: 138 MMHG | WEIGHT: 266 LBS | OXYGEN SATURATION: 96 % | HEIGHT: 65 IN | HEART RATE: 76 BPM

## 2024-10-15 DIAGNOSIS — E66.01 CLASS 3 SEVERE OBESITY WITH BODY MASS INDEX (BMI) OF 45.0 TO 49.9 IN ADULT, UNSPECIFIED OBESITY TYPE, UNSPECIFIED WHETHER SERIOUS COMORBIDITY PRESENT: ICD-10-CM

## 2024-10-15 DIAGNOSIS — R42 DIZZINESS: ICD-10-CM

## 2024-10-15 DIAGNOSIS — I10 ESSENTIAL HYPERTENSION: ICD-10-CM

## 2024-10-15 DIAGNOSIS — I51.89 DIASTOLIC DYSFUNCTION: ICD-10-CM

## 2024-10-15 DIAGNOSIS — R00.2 PALPITATIONS: ICD-10-CM

## 2024-10-15 DIAGNOSIS — E66.813 CLASS 3 SEVERE OBESITY WITH BODY MASS INDEX (BMI) OF 45.0 TO 49.9 IN ADULT, UNSPECIFIED OBESITY TYPE, UNSPECIFIED WHETHER SERIOUS COMORBIDITY PRESENT: ICD-10-CM

## 2024-10-15 DIAGNOSIS — R00.0 RACING HEART BEAT: ICD-10-CM

## 2024-10-15 DIAGNOSIS — G47.33 OBSTRUCTIVE SLEEP APNEA SYNDROME: ICD-10-CM

## 2024-10-15 DIAGNOSIS — I20.1 PRINZMETAL ANGINA (HCC): Primary | ICD-10-CM

## 2024-10-15 DIAGNOSIS — R06.09 DOE (DYSPNEA ON EXERTION): ICD-10-CM

## 2024-10-15 PROCEDURE — 3079F DIAST BP 80-89 MM HG: CPT | Performed by: INTERNAL MEDICINE

## 2024-10-15 PROCEDURE — 93000 ELECTROCARDIOGRAM COMPLETE: CPT | Performed by: INTERNAL MEDICINE

## 2024-10-15 PROCEDURE — 3075F SYST BP GE 130 - 139MM HG: CPT | Performed by: INTERNAL MEDICINE

## 2024-10-15 PROCEDURE — 99214 OFFICE O/P EST MOD 30 MIN: CPT | Performed by: INTERNAL MEDICINE

## 2024-10-15 PROCEDURE — G2211 COMPLEX E/M VISIT ADD ON: HCPCS | Performed by: INTERNAL MEDICINE

## 2024-10-15 RX ORDER — AMOXICILLIN 500 MG
CAPSULE ORAL 2 TIMES DAILY
COMMUNITY

## 2024-10-15 RX ORDER — METOPROLOL SUCCINATE 25 MG/1
25 TABLET, EXTENDED RELEASE ORAL DAILY
Qty: 90 TABLET | Refills: 3 | Status: SHIPPED | OUTPATIENT
Start: 2024-10-15

## 2024-10-15 RX ORDER — ROSUVASTATIN CALCIUM 20 MG/1
20 TABLET, COATED ORAL DAILY
Qty: 90 TABLET | Refills: 1 | Status: SHIPPED | OUTPATIENT
Start: 2024-10-15

## 2024-10-15 RX ORDER — MULTIVITAMIN WITH IRON
250 TABLET ORAL NIGHTLY
COMMUNITY

## 2024-10-15 RX ORDER — MULTIVITAMIN WITH IRON
1 TABLET ORAL DAILY
COMMUNITY

## 2024-10-15 NOTE — PATIENT INSTRUCTIONS
Stop Pravastatin     Start Crestor 20 mg daily   Start Metoprolol Succinate (Toprol XL) 25 mg daily - Wait to start this medication until 3-4 days after heart monitor is placed    Proceed to the ED if your heart rate is over 150    30 day cardiac event monitor placed in office today     Echocardiogram soon     Labs today - CBC, CMP, BNP, Iron & TIBC, Ferritin    Follow up with Dr. Bhatia in 2 months

## 2024-10-23 ENCOUNTER — TELEPHONE (OUTPATIENT)
Dept: ENT CLINIC | Age: 59
End: 2024-10-23

## 2024-10-23 NOTE — TELEPHONE ENCOUNTER
Total 1500.00 on purchase agreement but pt states she got a bill from [employer] for $2175.00 total, stating she owes another 675.00. She would like a phone call in regards to this. She states she was not quoted this other price and will return the hearing aids if this is the case.

## 2024-10-24 ENCOUNTER — TELEPHONE (OUTPATIENT)
Dept: CARDIOLOGY CLINIC | Age: 59
End: 2024-10-24

## 2024-10-24 NOTE — TELEPHONE ENCOUNTER
Spoke to patient and advised per [facility]s message. Discussed DD and what that means. Pt will have labs done today. Pt very appreciative of the quick call from the time she got her test done. No further questions at this time.

## 2024-10-24 NOTE — TELEPHONE ENCOUNTER
----- Message from Dr. Dave Bhatia MD sent at 10/24/2024 11:01 AM EDT -----  Echo normal except diastolic dysfunction is indeterminate (might be there, but can't say for sure)    We ordered a BNP but doesn't look like she has had her labs done

## 2024-10-24 NOTE — TELEPHONE ENCOUNTER
Confirmation received. Call placed to patient and advised that orders have been faxed per her request.

## 2024-10-25 LAB
A/G RATIO: 1.4 (ref 1–2)
ALBUMIN: 3.8 G/DL (ref 3.5–5.7)
ALP BLD-CCNC: 78 U/L (ref 34–104)
ALT SERPL-CCNC: 14 U/L (ref 7–52)
ANION GAP SERPL CALCULATED.3IONS-SCNC: 11 MMOL/L (ref 7–16)
AST SERPL-CCNC: 17 U/L (ref 13–39)
B-TYPE NATRIURETIC PEPTIDE: 50 PG/ML
BILIRUB SERPL-MCNC: 0.4 MG/DL (ref 0.3–1)
BUN BLDV-MCNC: 13 MG/DL (ref 7–25)
CALCIUM SERPL-MCNC: 9.6 MG/DL (ref 8.6–10.2)
CHLORIDE BLD-SCNC: 103 MMOL/L (ref 98–107)
CO2: 27 MMOL/L (ref 21–31)
CREAT SERPL-MCNC: 0.71 MG/DL (ref 0.6–1.2)
EGFR FEMALE: >90 ML/MIN/1.73M2
FERRITIN: 38 NG/ML (ref 11–306.8)
GLOBULIN: 2.8 G/DL (ref 2.6–4.2)
GLUCOSE BLD-MCNC: 111 MG/DL (ref 74–109)
HCT VFR BLD CALC: 43.6 % (ref 35.8–46.5)
HEMOGLOBIN: 14.6 G/DL (ref 12.1–15.8)
IRON % SATURATION: 27 % (ref 20–40)
IRON: 93 UG/DL (ref 50–170)
MCH RBC QN AUTO: 30 PG (ref 28.4–33.4)
MCHC RBC AUTO-ENTMCNC: 33.5 G/DL (ref 31.1–37)
MCV RBC AUTO: 89.8 FL (ref 85–99)
PDW BLD-RTO: 14.1 % (ref 11.7–15.2)
PLATELET # BLD: 265 K/UL (ref 154–393)
POTASSIUM SERPL-SCNC: 3.7 MMOL/L (ref 3.5–5.1)
RBC # BLD: 4.85 M/UL (ref 3.86–5.17)
SODIUM BLD-SCNC: 141 MMOL/L (ref 136–145)
TOTAL IRON BINDING CAPACITY: 339 UG/DL (ref 250–450)
TOTAL PROTEIN: 6.6 G/DL (ref 6–8.3)
UNSATURATED IRON BINDING CAPACITY: 246 UG/DL (ref 155–355)
WBC # BLD: 5.5 K/UL (ref 4–10.5)

## 2024-10-28 ENCOUNTER — TELEPHONE (OUTPATIENT)
Dept: CARDIOLOGY CLINIC | Age: 59
End: 2024-10-28

## 2024-10-28 NOTE — TELEPHONE ENCOUNTER
----- Message from Dr. Dave Bhatia MD sent at 10/25/2024  4:39 PM EDT -----  Ferritin < 100 so needs IV iron  Dx Diastolic heart failure, iron defiiciency due to poor intestinal absorption

## 2024-10-28 NOTE — TELEPHONE ENCOUNTER
Patient called back to further discuss her labs. Questions answered. No further questions at this time.

## 2024-10-29 DIAGNOSIS — K90.9 IRON MALABSORPTION: ICD-10-CM

## 2024-10-29 DIAGNOSIS — D50.8 IRON DEFICIENCY ANEMIA SECONDARY TO INADEQUATE DIETARY IRON INTAKE: Primary | ICD-10-CM

## 2024-10-29 PROBLEM — I50.30 DIASTOLIC HEART FAILURE (HCC): Status: ACTIVE | Noted: 2024-01-10

## 2024-10-29 RX ORDER — SODIUM CHLORIDE 0.9 % (FLUSH) 0.9 %
5-40 SYRINGE (ML) INJECTION PRN
Status: CANCELLED | OUTPATIENT
Start: 2024-10-29

## 2024-10-29 RX ORDER — SODIUM CHLORIDE 9 MG/ML
5-250 INJECTION, SOLUTION INTRAVENOUS PRN
Status: CANCELLED | OUTPATIENT
Start: 2024-10-29

## 2024-10-30 RX ORDER — SODIUM CHLORIDE 9 MG/ML
5-250 INJECTION, SOLUTION INTRAVENOUS PRN
OUTPATIENT
Start: 2024-10-30

## 2024-10-30 RX ORDER — SODIUM CHLORIDE 0.9 % (FLUSH) 0.9 %
5-40 SYRINGE (ML) INJECTION PRN
OUTPATIENT
Start: 2024-10-30

## 2024-11-01 ENCOUNTER — APPOINTMENT (OUTPATIENT)
Dept: GENERAL RADIOLOGY | Age: 59
End: 2024-11-01
Payer: COMMERCIAL

## 2024-11-01 ENCOUNTER — TELEPHONE (OUTPATIENT)
Dept: CARDIOLOGY CLINIC | Age: 59
End: 2024-11-01

## 2024-11-01 ENCOUNTER — HOSPITAL ENCOUNTER (OUTPATIENT)
Age: 59
Setting detail: OBSERVATION
Discharge: HOME | End: 2024-11-02
Attending: EMERGENCY MEDICINE | Admitting: INTERNAL MEDICINE
Payer: COMMERCIAL

## 2024-11-01 DIAGNOSIS — I10 HYPERTENSION, UNSPECIFIED TYPE: ICD-10-CM

## 2024-11-01 DIAGNOSIS — I20.0 UNSTABLE ANGINA (HCC): Primary | ICD-10-CM

## 2024-11-01 LAB
ALBUMIN SERPL-MCNC: 4.6 G/DL (ref 3.4–5)
ALBUMIN/GLOB SERPL: 1.6 {RATIO} (ref 1.1–2.2)
ALP SERPL-CCNC: 89 U/L (ref 40–129)
ALT SERPL-CCNC: 14 U/L (ref 10–40)
ANION GAP SERPL CALCULATED.3IONS-SCNC: 15 MMOL/L (ref 3–16)
AST SERPL-CCNC: 21 U/L (ref 15–37)
BACTERIA URNS QL MICRO: ABNORMAL /HPF
BASOPHILS # BLD: 0.1 K/UL (ref 0–0.2)
BASOPHILS NFR BLD: 0.7 %
BILIRUB SERPL-MCNC: <0.2 MG/DL (ref 0–1)
BILIRUB UR QL STRIP.AUTO: NEGATIVE
BUN SERPL-MCNC: 16 MG/DL (ref 7–20)
CALCIUM SERPL-MCNC: 10.2 MG/DL (ref 8.3–10.6)
CHLORIDE SERPL-SCNC: 100 MMOL/L (ref 99–110)
CLARITY UR: ABNORMAL
CO2 SERPL-SCNC: 28 MMOL/L (ref 21–32)
COLOR UR: ABNORMAL
CREAT SERPL-MCNC: 0.8 MG/DL (ref 0.6–1.2)
DEPRECATED RDW RBC AUTO: 14.2 % (ref 12.4–15.4)
EOSINOPHIL # BLD: 0.1 K/UL (ref 0–0.6)
EOSINOPHIL NFR BLD: 2 %
EPI CELLS #/AREA URNS HPF: ABNORMAL /HPF (ref 0–5)
FLUAV RNA RESP QL NAA+PROBE: NOT DETECTED
FLUBV RNA RESP QL NAA+PROBE: NOT DETECTED
GFR SERPLBLD CREATININE-BSD FMLA CKD-EPI: 84 ML/MIN/{1.73_M2}
GLUCOSE SERPL-MCNC: 105 MG/DL (ref 70–99)
GLUCOSE UR STRIP.AUTO-MCNC: >=1000 MG/DL
HCT VFR BLD AUTO: 44.9 % (ref 36–48)
HGB BLD-MCNC: 15.4 G/DL (ref 12–16)
HGB UR QL STRIP.AUTO: NEGATIVE
KETONES UR STRIP.AUTO-MCNC: ABNORMAL MG/DL
LEUKOCYTE ESTERASE UR QL STRIP.AUTO: NEGATIVE
LYMPHOCYTES # BLD: 1.6 K/UL (ref 1–5.1)
LYMPHOCYTES NFR BLD: 23.9 %
MCH RBC QN AUTO: 30.3 PG (ref 26–34)
MCHC RBC AUTO-ENTMCNC: 34.4 G/DL (ref 31–36)
MCV RBC AUTO: 88.3 FL (ref 80–100)
MONOCYTES # BLD: 0.6 K/UL (ref 0–1.3)
MONOCYTES NFR BLD: 9 %
NEUTROPHILS # BLD: 4.4 K/UL (ref 1.7–7.7)
NEUTROPHILS NFR BLD: 64.4 %
NITRITE UR QL STRIP.AUTO: NEGATIVE
NT-PROBNP SERPL-MCNC: 43 PG/ML (ref 0–124)
PH UR STRIP.AUTO: 6 [PH] (ref 5–8)
PLATELET # BLD AUTO: 312 K/UL (ref 135–450)
PMV BLD AUTO: 9.4 FL (ref 5–10.5)
POTASSIUM SERPL-SCNC: 4 MMOL/L (ref 3.5–5.1)
PROT SERPL-MCNC: 7.4 G/DL (ref 6.4–8.2)
PROT UR STRIP.AUTO-MCNC: ABNORMAL MG/DL
RBC # BLD AUTO: 5.08 M/UL (ref 4–5.2)
RBC #/AREA URNS HPF: ABNORMAL /HPF (ref 0–4)
S PYO AG THROAT QL: NEGATIVE
SARS-COV-2 RNA RESP QL NAA+PROBE: NOT DETECTED
SODIUM SERPL-SCNC: 143 MMOL/L (ref 136–145)
SP GR UR STRIP.AUTO: >=1.03 (ref 1–1.03)
TROPONIN, HIGH SENSITIVITY: 7 NG/L (ref 0–14)
TROPONIN, HIGH SENSITIVITY: 7 NG/L (ref 0–14)
UA COMPLETE W REFLEX CULTURE PNL UR: ABNORMAL
UA DIPSTICK W REFLEX MICRO PNL UR: YES
URN SPEC COLLECT METH UR: ABNORMAL
UROBILINOGEN UR STRIP-ACNC: 1 E.U./DL
WBC # BLD AUTO: 6.9 K/UL (ref 4–11)
WBC #/AREA URNS HPF: ABNORMAL /HPF (ref 0–5)
YEAST URNS QL MICRO: PRESENT /HPF

## 2024-11-01 PROCEDURE — G0378 HOSPITAL OBSERVATION PER HR: HCPCS

## 2024-11-01 PROCEDURE — 6370000000 HC RX 637 (ALT 250 FOR IP): Performed by: PHYSICIAN ASSISTANT

## 2024-11-01 PROCEDURE — 80053 COMPREHEN METABOLIC PANEL: CPT

## 2024-11-01 PROCEDURE — 87636 SARSCOV2 & INF A&B AMP PRB: CPT

## 2024-11-01 PROCEDURE — 96374 THER/PROPH/DIAG INJ IV PUSH: CPT

## 2024-11-01 PROCEDURE — 99285 EMERGENCY DEPT VISIT HI MDM: CPT

## 2024-11-01 PROCEDURE — 2580000003 HC RX 258: Performed by: PHYSICIAN ASSISTANT

## 2024-11-01 PROCEDURE — 84484 ASSAY OF TROPONIN QUANT: CPT

## 2024-11-01 PROCEDURE — 85025 COMPLETE CBC W/AUTO DIFF WBC: CPT

## 2024-11-01 PROCEDURE — 93005 ELECTROCARDIOGRAM TRACING: CPT | Performed by: EMERGENCY MEDICINE

## 2024-11-01 PROCEDURE — 96375 TX/PRO/DX INJ NEW DRUG ADDON: CPT

## 2024-11-01 PROCEDURE — 6360000002 HC RX W HCPCS: Performed by: EMERGENCY MEDICINE

## 2024-11-01 PROCEDURE — 83880 ASSAY OF NATRIURETIC PEPTIDE: CPT

## 2024-11-01 PROCEDURE — 87880 STREP A ASSAY W/OPTIC: CPT

## 2024-11-01 PROCEDURE — 81001 URINALYSIS AUTO W/SCOPE: CPT

## 2024-11-01 PROCEDURE — 6360000002 HC RX W HCPCS: Performed by: PHYSICIAN ASSISTANT

## 2024-11-01 PROCEDURE — 71046 X-RAY EXAM CHEST 2 VIEWS: CPT

## 2024-11-01 RX ORDER — TRIAMTERENE AND HYDROCHLOROTHIAZIDE 37.5; 25 MG/1; MG/1
1 TABLET ORAL DAILY
Status: DISCONTINUED | OUTPATIENT
Start: 2024-11-02 | End: 2024-11-02 | Stop reason: HOSPADM

## 2024-11-01 RX ORDER — MULTIVITAMIN WITH IRON
1 TABLET ORAL DAILY
Status: DISCONTINUED | OUTPATIENT
Start: 2024-11-02 | End: 2024-11-02 | Stop reason: HOSPADM

## 2024-11-01 RX ORDER — METOPROLOL SUCCINATE 25 MG/1
25 TABLET, EXTENDED RELEASE ORAL DAILY
Status: DISCONTINUED | OUTPATIENT
Start: 2024-11-02 | End: 2024-11-02 | Stop reason: HOSPADM

## 2024-11-01 RX ORDER — LANOLIN ALCOHOL/MO/W.PET/CERES
6 CREAM (GRAM) TOPICAL NIGHTLY
Status: DISCONTINUED | OUTPATIENT
Start: 2024-11-01 | End: 2024-11-02 | Stop reason: HOSPADM

## 2024-11-01 RX ORDER — METOCLOPRAMIDE HYDROCHLORIDE 5 MG/ML
10 INJECTION INTRAMUSCULAR; INTRAVENOUS ONCE
Status: COMPLETED | OUTPATIENT
Start: 2024-11-01 | End: 2024-11-01

## 2024-11-01 RX ORDER — SENNOSIDES A AND B 8.6 MG/1
1 TABLET, FILM COATED ORAL DAILY PRN
Status: DISCONTINUED | OUTPATIENT
Start: 2024-11-01 | End: 2024-11-02 | Stop reason: HOSPADM

## 2024-11-01 RX ORDER — NITROGLYCERIN 0.4 MG/1
0.4 TABLET SUBLINGUAL EVERY 5 MIN PRN
Status: DISCONTINUED | OUTPATIENT
Start: 2024-11-01 | End: 2024-11-02 | Stop reason: HOSPADM

## 2024-11-01 RX ORDER — ATOGEPANT 60 MG/1
1 TABLET ORAL NIGHTLY
COMMUNITY

## 2024-11-01 RX ORDER — MECLIZINE HCL 12.5 MG 12.5 MG/1
25 TABLET ORAL 2 TIMES DAILY PRN
Status: DISCONTINUED | OUTPATIENT
Start: 2024-11-01 | End: 2024-11-02 | Stop reason: HOSPADM

## 2024-11-01 RX ORDER — IBUPROFEN 800 MG/1
800 TABLET, FILM COATED ORAL DAILY PRN
COMMUNITY

## 2024-11-01 RX ORDER — ALBUTEROL SULFATE 90 UG/1
2 INHALANT RESPIRATORY (INHALATION) EVERY 4 HOURS PRN
Status: DISCONTINUED | OUTPATIENT
Start: 2024-11-01 | End: 2024-11-02 | Stop reason: HOSPADM

## 2024-11-01 RX ORDER — ACETAMINOPHEN 500 MG
1000 TABLET ORAL ONCE
Status: COMPLETED | OUTPATIENT
Start: 2024-11-01 | End: 2024-11-01

## 2024-11-01 RX ORDER — ASPIRIN 81 MG/1
81 TABLET, CHEWABLE ORAL DAILY
Status: DISCONTINUED | OUTPATIENT
Start: 2024-11-02 | End: 2024-11-02 | Stop reason: HOSPADM

## 2024-11-01 RX ORDER — ONDANSETRON 2 MG/ML
4 INJECTION INTRAMUSCULAR; INTRAVENOUS EVERY 6 HOURS PRN
Status: DISCONTINUED | OUTPATIENT
Start: 2024-11-01 | End: 2024-11-02 | Stop reason: HOSPADM

## 2024-11-01 RX ORDER — DIPHENHYDRAMINE HYDROCHLORIDE 50 MG/ML
25 INJECTION INTRAMUSCULAR; INTRAVENOUS ONCE
Status: COMPLETED | OUTPATIENT
Start: 2024-11-01 | End: 2024-11-01

## 2024-11-01 RX ORDER — SODIUM CHLORIDE 0.9 % (FLUSH) 0.9 %
10 SYRINGE (ML) INJECTION EVERY 12 HOURS SCHEDULED
Status: DISCONTINUED | OUTPATIENT
Start: 2024-11-01 | End: 2024-11-02 | Stop reason: HOSPADM

## 2024-11-01 RX ORDER — SODIUM CHLORIDE 9 MG/ML
INJECTION, SOLUTION INTRAVENOUS PRN
Status: DISCONTINUED | OUTPATIENT
Start: 2024-11-01 | End: 2024-11-02 | Stop reason: HOSPADM

## 2024-11-01 RX ORDER — PANTOPRAZOLE SODIUM 40 MG/1
40 TABLET, DELAYED RELEASE ORAL
Status: DISCONTINUED | OUTPATIENT
Start: 2024-11-02 | End: 2024-11-02 | Stop reason: HOSPADM

## 2024-11-01 RX ORDER — ACETAMINOPHEN 325 MG/1
650 TABLET ORAL EVERY 6 HOURS PRN
Status: DISCONTINUED | OUTPATIENT
Start: 2024-11-01 | End: 2024-11-02 | Stop reason: HOSPADM

## 2024-11-01 RX ORDER — KETOROLAC TROMETHAMINE 15 MG/ML
15 INJECTION, SOLUTION INTRAMUSCULAR; INTRAVENOUS ONCE
Status: COMPLETED | OUTPATIENT
Start: 2024-11-01 | End: 2024-11-01

## 2024-11-01 RX ORDER — SODIUM CHLORIDE 0.9 % (FLUSH) 0.9 %
10 SYRINGE (ML) INJECTION PRN
Status: DISCONTINUED | OUTPATIENT
Start: 2024-11-01 | End: 2024-11-02 | Stop reason: HOSPADM

## 2024-11-01 RX ORDER — DIAZEPAM 5 MG/1
10 TABLET ORAL 2 TIMES DAILY PRN
Status: DISCONTINUED | OUTPATIENT
Start: 2024-11-01 | End: 2024-11-02 | Stop reason: HOSPADM

## 2024-11-01 RX ORDER — LANOLIN ALCOHOL/MO/W.PET/CERES
200 CREAM (GRAM) TOPICAL NIGHTLY
Status: DISCONTINUED | OUTPATIENT
Start: 2024-11-01 | End: 2024-11-02 | Stop reason: HOSPADM

## 2024-11-01 RX ORDER — AMLODIPINE BESYLATE 5 MG/1
5 TABLET ORAL DAILY
Status: DISCONTINUED | OUTPATIENT
Start: 2024-11-02 | End: 2024-11-02 | Stop reason: HOSPADM

## 2024-11-01 RX ORDER — ACETAMINOPHEN 650 MG/1
650 SUPPOSITORY RECTAL EVERY 6 HOURS PRN
Status: DISCONTINUED | OUTPATIENT
Start: 2024-11-01 | End: 2024-11-02 | Stop reason: HOSPADM

## 2024-11-01 RX ORDER — ROSUVASTATIN CALCIUM 20 MG/1
20 TABLET, COATED ORAL NIGHTLY
Status: DISCONTINUED | OUTPATIENT
Start: 2024-11-01 | End: 2024-11-02 | Stop reason: HOSPADM

## 2024-11-01 RX ORDER — ENOXAPARIN SODIUM 100 MG/ML
30 INJECTION SUBCUTANEOUS 2 TIMES DAILY
Status: DISCONTINUED | OUTPATIENT
Start: 2024-11-02 | End: 2024-11-02 | Stop reason: HOSPADM

## 2024-11-01 RX ORDER — RANOLAZINE 500 MG/1
500 TABLET, EXTENDED RELEASE ORAL 2 TIMES DAILY
Status: DISCONTINUED | OUTPATIENT
Start: 2024-11-01 | End: 2024-11-02 | Stop reason: HOSPADM

## 2024-11-01 RX ORDER — RIMEGEPANT SULFATE 75 MG/75MG
1 TABLET, ORALLY DISINTEGRATING ORAL EVERY 24 HOURS
COMMUNITY

## 2024-11-01 RX ORDER — ASPIRIN 81 MG/1
324 TABLET, CHEWABLE ORAL ONCE
Status: COMPLETED | OUTPATIENT
Start: 2024-11-01 | End: 2024-11-01

## 2024-11-01 RX ADMIN — SODIUM CHLORIDE, PRESERVATIVE FREE 10 ML: 5 INJECTION INTRAVENOUS at 23:04

## 2024-11-01 RX ADMIN — NITROGLYCERIN 0.5 INCH: 20 OINTMENT TOPICAL at 22:04

## 2024-11-01 RX ADMIN — ACETAMINOPHEN 1000 MG: 500 TABLET ORAL at 20:13

## 2024-11-01 RX ADMIN — METOCLOPRAMIDE HYDROCHLORIDE 10 MG: 5 INJECTION INTRAMUSCULAR; INTRAVENOUS at 20:11

## 2024-11-01 RX ADMIN — DIPHENHYDRAMINE HYDROCHLORIDE 25 MG: 50 INJECTION INTRAMUSCULAR; INTRAVENOUS at 20:11

## 2024-11-01 RX ADMIN — ASPIRIN 324 MG: 81 TABLET, CHEWABLE ORAL at 22:04

## 2024-11-01 RX ADMIN — MELATONIN TAB 3 MG 6 MG: 3 TAB at 23:04

## 2024-11-01 RX ADMIN — ROSUVASTATIN CALCIUM 20 MG: 20 TABLET, FILM COATED ORAL at 23:04

## 2024-11-01 RX ADMIN — KETOROLAC TROMETHAMINE 15 MG: 15 INJECTION, SOLUTION INTRAMUSCULAR; INTRAVENOUS at 22:04

## 2024-11-01 RX ADMIN — Medication 200 MG: at 23:04

## 2024-11-01 RX ADMIN — RANOLAZINE 500 MG: 500 TABLET, EXTENDED RELEASE ORAL at 23:04

## 2024-11-01 ASSESSMENT — ENCOUNTER SYMPTOMS
VOMITING: 0
RHINORRHEA: 0
COUGH: 0
SHORTNESS OF BREATH: 1
DIARRHEA: 0
NAUSEA: 0
ABDOMINAL PAIN: 0

## 2024-11-01 ASSESSMENT — LIFESTYLE VARIABLES: HOW OFTEN DO YOU HAVE A DRINK CONTAINING ALCOHOL: NEVER

## 2024-11-01 NOTE — TELEPHONE ENCOUNTER
Pt called to inform the office that she is experiencing headaches, chest tightness, sob. Please call to discuss what can be done.  Thank you

## 2024-11-01 NOTE — TELEPHONE ENCOUNTER
Spoke with patient she is dizzy and lightheaded for two day now. B/P 143/103 P 62.     Advised patient per [facility] she should go to the ER to be evaluated. Voiced understanding.

## 2024-11-02 VITALS
TEMPERATURE: 98.4 F | BODY MASS INDEX: 45.11 KG/M2 | HEART RATE: 68 BPM | SYSTOLIC BLOOD PRESSURE: 122 MMHG | WEIGHT: 270.73 LBS | DIASTOLIC BLOOD PRESSURE: 75 MMHG | RESPIRATION RATE: 18 BRPM | OXYGEN SATURATION: 95 % | HEIGHT: 65 IN

## 2024-11-02 LAB
ANION GAP SERPL CALCULATED.3IONS-SCNC: 11 MMOL/L (ref 3–16)
BUN SERPL-MCNC: 22 MG/DL (ref 7–20)
CALCIUM SERPL-MCNC: 9.6 MG/DL (ref 8.3–10.6)
CHLORIDE SERPL-SCNC: 101 MMOL/L (ref 99–110)
CHOLEST SERPL-MCNC: 216 MG/DL (ref 0–199)
CO2 SERPL-SCNC: 27 MMOL/L (ref 21–32)
CREAT SERPL-MCNC: 0.9 MG/DL (ref 0.6–1.2)
DEPRECATED RDW RBC AUTO: 14 % (ref 12.4–15.4)
EKG ATRIAL RATE: 67 BPM
EKG DIAGNOSIS: NORMAL
EKG P AXIS: 40 DEGREES
EKG P-R INTERVAL: 166 MS
EKG Q-T INTERVAL: 400 MS
EKG QRS DURATION: 82 MS
EKG QTC CALCULATION (BAZETT): 422 MS
EKG R AXIS: -11 DEGREES
EKG T AXIS: 14 DEGREES
EKG VENTRICULAR RATE: 67 BPM
GFR SERPLBLD CREATININE-BSD FMLA CKD-EPI: 73 ML/MIN/{1.73_M2}
GLUCOSE SERPL-MCNC: 114 MG/DL (ref 70–99)
HCT VFR BLD AUTO: 41.8 % (ref 36–48)
HDLC SERPL-MCNC: 54 MG/DL (ref 40–60)
HGB BLD-MCNC: 14 G/DL (ref 12–16)
LDLC SERPL CALC-MCNC: 113 MG/DL
MCH RBC QN AUTO: 29.6 PG (ref 26–34)
MCHC RBC AUTO-ENTMCNC: 33.5 G/DL (ref 31–36)
MCV RBC AUTO: 88.4 FL (ref 80–100)
PLATELET # BLD AUTO: 259 K/UL (ref 135–450)
PMV BLD AUTO: 9.6 FL (ref 5–10.5)
POTASSIUM SERPL-SCNC: 3.9 MMOL/L (ref 3.5–5.1)
RBC # BLD AUTO: 4.73 M/UL (ref 4–5.2)
SODIUM SERPL-SCNC: 139 MMOL/L (ref 136–145)
TRIGL SERPL-MCNC: 245 MG/DL (ref 0–150)
VLDLC SERPL CALC-MCNC: 49 MG/DL
WBC # BLD AUTO: 6.2 K/UL (ref 4–11)

## 2024-11-02 PROCEDURE — 36415 COLL VENOUS BLD VENIPUNCTURE: CPT

## 2024-11-02 PROCEDURE — G0378 HOSPITAL OBSERVATION PER HR: HCPCS

## 2024-11-02 PROCEDURE — 85027 COMPLETE CBC AUTOMATED: CPT

## 2024-11-02 PROCEDURE — 6370000000 HC RX 637 (ALT 250 FOR IP): Performed by: PHYSICIAN ASSISTANT

## 2024-11-02 PROCEDURE — 80061 LIPID PANEL: CPT

## 2024-11-02 PROCEDURE — 2580000003 HC RX 258: Performed by: PHYSICIAN ASSISTANT

## 2024-11-02 PROCEDURE — 96372 THER/PROPH/DIAG INJ SC/IM: CPT

## 2024-11-02 PROCEDURE — 6360000002 HC RX W HCPCS: Performed by: PHYSICIAN ASSISTANT

## 2024-11-02 PROCEDURE — 99223 1ST HOSP IP/OBS HIGH 75: CPT | Performed by: INTERNAL MEDICINE

## 2024-11-02 PROCEDURE — 80048 BASIC METABOLIC PNL TOTAL CA: CPT

## 2024-11-02 PROCEDURE — 93010 ELECTROCARDIOGRAM REPORT: CPT | Performed by: INTERNAL MEDICINE

## 2024-11-02 RX ADMIN — PANTOPRAZOLE SODIUM 40 MG: 40 TABLET, DELAYED RELEASE ORAL at 05:07

## 2024-11-02 RX ADMIN — EMPAGLIFLOZIN 10 MG: 10 TABLET, FILM COATED ORAL at 09:03

## 2024-11-02 RX ADMIN — ASPIRIN 81 MG: 81 TABLET, CHEWABLE ORAL at 09:03

## 2024-11-02 RX ADMIN — METOPROLOL SUCCINATE 25 MG: 25 TABLET, EXTENDED RELEASE ORAL at 09:03

## 2024-11-02 RX ADMIN — RANOLAZINE 500 MG: 500 TABLET, EXTENDED RELEASE ORAL at 09:03

## 2024-11-02 RX ADMIN — ENOXAPARIN SODIUM 30 MG: 100 INJECTION SUBCUTANEOUS at 09:03

## 2024-11-02 RX ADMIN — THERA TABS 1 TABLET: TAB at 09:03

## 2024-11-02 RX ADMIN — SERTRALINE 150 MG: 50 TABLET, FILM COATED ORAL at 09:03

## 2024-11-02 RX ADMIN — TRIAMTERENE AND HYDROCHLOROTHIAZIDE 1 TABLET: 37.5; 25 TABLET ORAL at 09:03

## 2024-11-02 RX ADMIN — SODIUM CHLORIDE, PRESERVATIVE FREE 10 ML: 5 INJECTION INTRAVENOUS at 09:04

## 2024-11-02 RX ADMIN — AMLODIPINE BESYLATE 5 MG: 5 TABLET ORAL at 09:03

## 2024-11-02 NOTE — PROGRESS NOTES
Pharmacy Home Medication Reconciliation Note    A medication reconciliation has been completed for Maddi Sanford 1965    Pharmacy: [address] [facility]   Information provided by: patient     The patient's home medication list is as follows:  No current facility-administered medications on file prior to encounter.     Current Outpatient Medications on File Prior to Encounter   Medication Sig Dispense Refill    Atogepant (QULIPTA) 60 MG TABS Take 1 tablet by mouth at bedtime      ibuprofen (ADVIL;MOTRIN) 800 MG tablet Take 1 tablet by mouth daily as needed for Pain      Multiple Vitamin (MULTIVITAMIN) TABS tablet Take 1 tablet by mouth daily      Probiotic Product (PRO-BIOTIC BLEND PO) Take 1 tablet by mouth Daily      Omega-3 Fatty Acids (FISH OIL) 1200 MG CAPS Take 1,200 mg by mouth 2 times daily      melatonin 5 MG TABS tablet Take 1 tablet by mouth nightly      magnesium (MAGNESIUM-OXIDE) 250 MG TABS tablet Take 1 tablet by mouth nightly      rosuvastatin (CRESTOR) 20 MG tablet Take 1 tablet by mouth daily (Patient taking differently: Take 1 tablet by mouth at bedtime) 90 tablet 1    metoprolol succinate (TOPROL XL) 25 MG extended release tablet Take 1 tablet by mouth daily 90 tablet 3    ranolazine (RANEXA) 500 MG extended release tablet Take 1 tablet by mouth 2 times daily 180 tablet 3    albuterol sulfate HFA (PROVENTIL;VENTOLIN;PROAIR) 108 (90 Base) MCG/ACT inhaler Inhale 2 puffs into the lungs every 4 hours as needed      nystatin (MYCOSTATIN) 482494 UNIT/GM powder Apply topically 2 times daily APPLY TO AFFECTED AREA      diazePAM (VALIUM) 10 MG tablet Take 1 tablet by mouth 2 times daily as needed.      promethazine (PHENERGAN) 25 MG tablet Take 1 tablet by mouth every 6 hours as needed for Nausea 60 tablet 0    empagliflozin (JARDIANCE) 10 MG tablet Take 1 tablet by mouth daily 90 tablet 3    amLODIPine (NORVASC) 5 MG tablet Take 1 tablet by mouth daily 90 tablet 3    CRANBERRY PO Take by  mouth 30,000, 2 tablets daily      nitroGLYCERIN (NITROSTAT) 0.4 MG SL tablet Place 1 tablet under the tongue every 5 minutes as needed for Chest pain No more than 3 tabs in a 15 minute period. 25 tablet 3    senna (SENOKOT) 8.6 MG tablet Take 1 tablet by mouth as needed for Constipation      triamterene-hydrochlorothiazide (MAXZIDE-25) 37.5-25 MG per tablet Take 1 tablet by mouth daily      sertraline (ZOLOFT) 100 MG tablet Take 1 tablet by mouth every morning      meclizine (ANTIVERT) 25 MG tablet Take 1 tablet by mouth 2 times daily as needed for Dizziness      omeprazole (PRILOSEC) 20 MG capsule Take 1 capsule by mouth 2 times daily      rimegepant sulfate (NURTEC) 75 MG TBDP Take 1 tablet by mouth every 24 hours Take one tablet by mouth every 24 hour as needed for migraine      ondansetron (ZOFRAN-ODT) 4 MG disintegrating tablet Take 1 tablet by mouth 4 times daily as needed for Nausea or Vomiting (Patient not taking: Reported on 11/1/2024) 30 tablet 1       Patient is no longer taking zofran    Of note, patient took morning medications .    Timing of last doses updated.    Thank you,  Cindy Hurst [facility]

## 2024-11-02 NOTE — DISCHARGE SUMMARY
Patient: Maddi Sanford     Gender: female  : 1965   Age: 59 y.o.  MRN: [id number]    Admitting Physician: Wisam Hansen MD  Discharge Physician: Michela Lenz MD     Code Status: Full Code     Admit Date: 2024   Discharge Date:  24     Disposition:  Home    Discharge Diagnoses:  Chest pain : Suspected coronary vasospasm      Active Hospital Problems    Diagnosis Date Noted    Chest pain [R07.9] 2010       Follow-up appointments:  one week    Outpatient to do list: none     Condition at Discharge:  Stable    Hospital Course:   59-year-old admitted with chest pain EKG troponins were negative seen by cardiology atypical symptoms suspected coronary vasospasm otherwise doing well and is being discharged    Discharge Medications:   Current Discharge Medication List        Current Discharge Medication List        Current Discharge Medication List        CONTINUE these medications which have NOT CHANGED    Details   Atogepant (QULIPTA) 60 MG TABS Take 1 tablet by mouth at bedtime      ibuprofen (ADVIL;MOTRIN) 800 MG tablet Take 1 tablet by mouth daily as needed for Pain      Multiple Vitamin (MULTIVITAMIN) TABS tablet Take 1 tablet by mouth daily      Probiotic Product (PRO-BIOTIC BLEND PO) Take 1 tablet by mouth Daily      Omega-3 Fatty Acids (FISH OIL) 1200 MG CAPS Take 1,200 mg by mouth 2 times daily      melatonin 5 MG TABS tablet Take 1 tablet by mouth nightly      magnesium (MAGNESIUM-OXIDE) 250 MG TABS tablet Take 1 tablet by mouth nightly      rosuvastatin (CRESTOR) 20 MG tablet Take 1 tablet by mouth daily  Qty: 90 tablet, Refills: 1      metoprolol succinate (TOPROL XL) 25 MG extended release tablet Take 1 tablet by mouth daily  Qty: 90 tablet, Refills: 3    Associated Diagnoses: Palpitations      ranolazine (RANEXA) 500 MG extended release tablet Take 1 tablet by mouth 2 times daily  Qty: 180 tablet, Refills: 3      albuterol sulfate HFA (PROVENTIL;VENTOLIN;PROAIR) 108 (90 Base) MCG/ACT

## 2024-11-02 NOTE — RT PROTOCOL NOTE
RT Inhaler-Nebulizer Bronchodilator Protocol Note    There is a bronchodilator order in the chart from a provider indicating to follow the RT Bronchodilator Protocol and there is an “Initiate RT Inhaler-Nebulizer Bronchodilator Protocol” order as well (see protocol at bottom of note).    CXR Findings:  No results found.    The findings from the last RT Protocol Assessment were as follows:   History Pulmonary Disease: Smoker 15 pack years or more  Respiratory Pattern: Regular pattern and RR 12-20 bpm  Breath Sounds: Slightly diminished and/or crackles  Cough: Strong, spontaneous, non-productive  Indication for Bronchodilator Therapy: On home bronchodilators  Bronchodilator Assessment Score: 3    Aerosolized bronchodilator medication orders have been revised according to the RT Inhaler-Nebulizer Bronchodilator Protocol below.    Respiratory Therapist to perform RT Therapy Protocol Assessment initially then follow the protocol.  Repeat RT Therapy Protocol Assessment PRN for score 0-3 or on second treatment, BID, and PRN for scores above 3.    No Indications - adjust the frequency to every 6 hours PRN wheezing or bronchospasm, if no treatments needed after 48 hours then discontinue using Per Protocol order mode.     If indication present, adjust the RT bronchodilator orders based on the Bronchodilator Assessment Score as indicated below.  Use Inhaler orders unless patient has one or more of the following: on home nebulizer, not able to hold breath for 10 seconds, is not alert and oriented, cannot activate and use MDI correctly, or respiratory rate 25 breaths per minute or more, then use the equivalent nebulizer order(s) with same Frequency and PRN reasons based on the score.  If a patient is on this medication at home then do not decrease Frequency below that used at home.    0-3 - enter or revise RT bronchodilator order(s) to equivalent RT Bronchodilator order with Frequency of every 4 hours PRN for wheezing or  increased work of breathing using Per Protocol order mode.        4-6 - enter or revise RT Bronchodilator order(s) to two equivalent RT bronchodilator orders with one order with BID Frequency and one order with Frequency of every 4 hours PRN wheezing or increased work of breathing using Per Protocol order mode.        7-10 - enter or revise RT Bronchodilator order(s) to two equivalent RT bronchodilator orders with one order with TID Frequency and one order with Frequency of every 4 hours PRN wheezing or increased work of breathing using Per Protocol order mode.       11-13 - enter or revise RT Bronchodilator order(s) to one equivalent RT bronchodilator order with QID Frequency and an Albuterol order with Frequency of every 4 hours PRN wheezing or increased work of breathing using Per Protocol order mode.      Greater than 13 - enter or revise RT Bronchodilator order(s) to one equivalent RT bronchodilator order with every 4 hours Frequency and an Albuterol order with Frequency of every 2 hours PRN wheezing or increased work of breathing using Per Protocol order mode.     RT to enter RT Home Evaluation for COPD & MDI Assessment order using Per Protocol order mode.    Electronically signed by Bairon Varela RCP on 11/2/2024 at 1:34 AM

## 2024-11-02 NOTE — ED PROVIDER NOTES
[facility] EMERGENCY DEPARTMENT  EMERGENCY DEPARTMENT ENCOUNTER        Pt Name: Maddi Sanford  MRN: [id number]  Birthdate 1965  Date of evaluation: 11/1/2024  Provider: Cass Woodward PA-C  PCP: Alma Delia Izaguirre MD  Note Started: 9:53 PM EDT 11/1/24       I have seen and evaluated this patient with my supervising physician Alfred De Jesus MD.      CHIEF COMPLAINT       Chief Complaint   Patient presents with    Hypertension     \"Felt weird for the past 2 days.\" Chest pain, SOB, high BP.       HISTORY OF PRESENT ILLNESS: 1 or more Elements     History From: Patient  Limitations to history : None    Maddi Sanford is a 59 y.o. female who presents to the emergency department today for evaluation for concerns of hypertension.  The patient reports that she has a history of Prinzmetal angina, she has a history of hypertension, history of migraine headaches, history of hyperlipidemia.  Patient reports that she has seen Dr. Bhatia, cardiology in the past.  Patient reports that she recently saw him and reported that she was having episodes of shortness of breath with exertion, as well as palpitations.  She reports that she was having some pain at that time she did have an echo which was normal, and she states that the symptoms were thought to be due to Prinzmetal angina.  She is on Ranexa and amlodipine for this.  Over the past 2 days the patient reports that her blood pressure has been running high and she states that she has been noticing a tightness and pressure-like sensation to her chest which is otherwise nonradiating.  She reports that the pain seems to wax and wane on its own.  She feels that this pain is currently different than the pain that she normally experiences.  Patient reports that she is also had increasing headaches and is attributing this to her blood pressure.  She reports that she does have a history of chronic daily migraine headaches.  There is no change in her headache  elevated vital signs are otherwise stable.  NIH is 0 there are no focal deficits.    Disposition Considerations (tests considered but not done, Admit vs D/C, Shared Decision Making, Pt Expectation of Test or Tx.):    EKG as documented by my attending, please see his note for further details    CBC shows no evidence of leukocytosis or anemia.  CMP is unremarkable.  Troponin is negative x 2 and x-ray is negative    .  Patient was given Tylenol, Reglan and Benadryl for her headache, and regards for her chest pain as well as her hypertension will do aspirin, as well as Nitropaste as she has not tolerated Imdur in the past secondary to headaches.  Patient is telling me that this is a different pain, and due to risk factors I feel that she would benefit from admission and cardiology consultation.  Please have some paged for admission, patient's updated, agreeable plan, stable for admission    I am the Primary Clinician of Record.  FINAL IMPRESSION      1. Unstable angina (HCC)    2. Hypertension, unspecified type          DISPOSITION/PLAN     DISPOSITION Admitted 11/01/2024 09:53:10 PM           PATIENT REFERRED TO:  No follow-up provider specified.    DISCHARGE MEDICATIONS:  New Prescriptions    No medications on file       DISCONTINUED MEDICATIONS:  Discontinued Medications    No medications on file              (Please note that portions of this note were completed with a voice recognition program.  Efforts were made to edit the dictations but occasionally words are mis-transcribed.)    Cass Woodward PA-C (electronically signed)        Cass Woodward PA-C  11/01/24 2029

## 2024-11-02 NOTE — PROGRESS NOTES
Atypical symptoms with presumed coronary vasospasm but no coronary artry disease    She feels much better after overnight stay    Recently started on qulipta(Atogepant) for migraines. Symptoms possibly related to that.      Rec- ok for discharge. She will f/up with neurology  F/up with Dr Bhatia as scheduled

## 2024-11-02 NOTE — PROGRESS NOTES
Pt discharged via self with family.  Pt has no complaints at this time.  IV and telemtry removed and discharge instructions given.  Pt is stable.

## 2024-11-02 NOTE — ED PROVIDER NOTES
This patient was seen by the Mid-Level Provider. I have seen and examined the patient, agree with the workup, evaluation, management and diagnosis. Care plan has been discussed. My assessment reveals a 59-year-old female who presents with chest pain.  This is a 59-year-old female who is followed with Dr. Bhatia, cardiology, who states that over the last 2 days patient having a new type of chest pain and discomfort along with some shortness of breath.  The patient also states her blood pressures have been high.  The patient sees Dr. Bhatia for angina and has had a recent stress test in the last year that is apparently normal.  She denies any cough or fevers or chills.  She denies abdominal pain.          Radiology results:    XR CHEST (2 VW)   Final Result   No acute process.               LABS:    Labs Reviewed   COMPREHENSIVE METABOLIC PANEL W/ REFLEX TO MG FOR LOW K - Abnormal; Notable for the following components:       Result Value    Glucose 105 (*)     All other components within normal limits   URINALYSIS WITH REFLEX TO CULTURE - Abnormal; Notable for the following components:    Color, UA DARK YELLOW (*)     Clarity, UA CLOUDY (*)     Glucose, Ur >=1000 (*)     Ketones, Urine TRACE (*)     Protein, UA TRACE (*)     All other components within normal limits   MICROSCOPIC URINALYSIS - Abnormal; Notable for the following components:    Bacteria, UA Rare (*)     Yeast, UA Present (*)     All other components within normal limits   COVID-19 & INFLUENZA COMBO   STREP SCREEN GROUP A THROAT   CBC WITH AUTO DIFFERENTIAL   TROPONIN   TROPONIN   BRAIN NATRIURETIC PEPTIDE           EKG:    Sinus rhythm at a rate of 67 beats a minute with no acute ST elevations or depressions or pathologic Q waves.    Exam:    Well-nourished female in no acute distress.  Heart was regular rate rhythm with no murmurs rubs gallops.  Chest was clear to auscultation bilaterally.      Medical decision makin-year-old female presents with

## 2024-11-02 NOTE — H&P
[facility] Medicine History & Physical        Name:  Maddi Sanford /Age/Sex: 1965  (59 y.o. female)   MRN & CSN:  [id number] & [id number] Encounter Date/Time: 2024 9:30 PM EDT   Location:  ED- PCP: Alma Delia Izaguirre MD         CHIEF COMPLAINT:   Chief Complaint   Patient presents with    Hypertension     \"Felt weird for the past 2 days.\" Chest pain, SOB, high BP.         HISTORY OF PRESENT ILLNESS:      History from: patient  Limitations to history : None     Maddi Sanford is a 59 y.o. female who presented to ED for evaluation of high blood pressure and chest pain/pressure.  Patient reports she has a history of Prinzmetal angina and is followed by Dr. STEELE.  She reports that she recently saw him and underwent an echo which was normal.  At that time patient was having episodes of shortness of breath with exertion, palpitations, and chest pain.  Her symptoms were thought to be due to Prinzmetal angina.  She is currently on Ranexa and amlodipine for this.  Over the past 2 days she reports that her blood pressure has been running high and she is noticed a tightness/pressure-like sensation in her chest.  She reports the pain waxes and wanes in severity.  She denies any known aggravating or alleviating factors.  She reports this pain she is currently experiencing is different than what she has had previously.  She reports she has also had increased headaches which she attributes to her blood pressure.  She reports that she also does have a history of chronic daily migraine headaches.  She denies any change in her headache compared to her typical migraine headaches.  She denies any change in vision, difficulty swallowing, numbness/tingling extremities, focal weakness.  She denies fever, cough, GI symptoms, urinary symptoms.  She denies any other complaints or concerns at this time.      Nitro was initially held due to patient having a headache which typically worsens with nitro.  However  (9/11/2023)    Exercise Vital Sign     Days of Exercise per Week: 0 days    Received from [facility], [facility] and [facility]    Interpersonal Safety    Received from [facility], [facility]    Housing/Utilities       LABS  CBC:   Recent Labs     11/01/24  1900   WBC 6.9   HGB 15.4        BMP:    Recent Labs     11/01/24  1900      K 4.0      CO2 28   BUN 16   CREATININE 0.8   GLUCOSE 105*     Hepatic:   Recent Labs     11/01/24 1900   AST 21   ALT 14   BILITOT <0.2   ALKPHOS 89     Lipids: No results found for: \"CHOL\", \"HDL\", \"TRIG\"  Hemoglobin A1C: No results found for: \"LABA1C\"  TSH:   Lab Results   Component Value Date/Time    TSH 2.76 10/04/2023 09:57 AM     Troponin: No results found for: \"TROPONINT\"  Lactic Acid: No results for input(s): \"LACTA\" in the last 72 hours.  BNP:   Recent Labs     11/01/24  1900   PROBNP 43     UA:  Lab Results   Component Value Date/Time    NITRU Negative 11/01/2024 07:00 PM    COLORU DARK YELLOW 11/01/2024 07:00 PM    PHUR 6.0 11/01/2024 07:00 PM    WBCUA 0-2 11/01/2024 07:00 PM    RBCUA None seen 11/01/2024 07:00 PM    YEAST Present 11/01/2024 07:00 PM    BACTERIA Rare 11/01/2024 07:00 PM    CLARITYU CLOUDY 11/01/2024 07:00 PM    LEUKOCYTESUR Negative 11/01/2024 07:00 PM    UROBILINOGEN 1.0 11/01/2024 07:00 PM    BILIRUBINUR Negative 11/01/2024 07:00 PM    BLOODU Negative 11/01/2024 07:00 PM    GLUCOSEU >=1000 11/01/2024 07:00 PM    KETUA TRACE 11/01/2024 07:00 PM     Urine Cultures: No results found for: \"LABURIN\"  Blood Cultures: No results found for: \"BC\"  No results found for: \"BLOODCULT2\"  Organism: No results found for: \"ORG\"    IMAGING/DIAGNOSTICS LAST 24 HOURS    XR CHEST (2 VW)    Result Date: 11/1/2024  EXAMINATION: TWO XRAY VIEWS OF THE CHEST 11/1/2024 6:58 pm COMPARISON: Chest radiograph 11/10/2023. HISTORY: ORDERING SYSTEM PROVIDED HISTORY:

## 2024-11-04 ENCOUNTER — TELEPHONE (OUTPATIENT)
Dept: CARDIOLOGY CLINIC | Age: 59
End: 2024-11-04

## 2024-11-04 DIAGNOSIS — G47.33 OBSTRUCTIVE SLEEP APNEA SYNDROME: Primary | ICD-10-CM

## 2024-11-04 NOTE — CONSULTS
[place] [address]                              CONSULTATION      PATIENT NAME: TIN COOK               : 1965  MED REC NO: [id number]                      ROOM: [place] [address]  ACCOUNT NO: [id number]                       ADMIT DATE: 2024  PROVIDER: Juarez Chowdhury MD      CONSULT DATE: 2024    REASON FOR CONSULTATION:  Evaluate a patient who presents with chest discomfort and just not feeling well.    HISTORY OF PRESENT ILLNESS:  The patient is a 59-year-old, retired , well known to our practice, followed by Dr. Roddy Bhatia.  She has had a normal cardiac catheterization in .  She had a cardiac calcium score of 0 recently, and therefore does not have any documented coronary artery disease.  She has had symptoms typical of angina with normal stress testing, so she has been diagnosed with presumed coronary vasospasm versus microvascular disease.  She has been well controlled on Ranexa and amlodipine.  She does not tolerate nitrates due to severe headaches.  She is being followed by Neurology for balance issues as well as migraine headaches.  Recently, she was started on Qulipta daily.  It seems that since she started that medication she has had difficulty with chest heaviness, just not feeling well.  Headaches have not improved.  She also has been tried on semaglutide without being able to tolerate that.  The patient is on numerous medications.  Also, since starting Qulipta she has noted her blood pressure has become much more elevated.  This morning after being observed in the hospital and given sublingual nitroglycerin, also a nitro ointment placed on her skin, although that is now off, she is feeling significantly better.  Also in the hospital, she has had troponins which have been normal.  EKG unremarkable.    PAST MEDICAL HISTORY:  Notable for generalized arthritis, history of asthma, history of

## 2024-11-04 NOTE — TELEPHONE ENCOUNTER
Pt called stating they called 11/1 and was told to go to the ER, pt was kept over night and would like to know if they need appt with WAK sooner than 12/6 appt?   Pt stated they did have test done and there were some that where abnormal pt would like to know if WAK has seen the result? Are there any recommendations?

## 2024-11-05 ENCOUNTER — PROCEDURE VISIT (OUTPATIENT)
Dept: AUDIOLOGY | Age: 59
End: 2024-11-05

## 2024-11-05 DIAGNOSIS — H90.3 SENSORINEURAL HEARING LOSS, BILATERAL: Primary | ICD-10-CM

## 2024-11-05 NOTE — TELEPHONE ENCOUNTER
Chest doesn't feel bad, but terrible headache and no energy. It was recommended for her to f/u with sleep to see if her machine needs calibrated. She sleeps better, but not great. Referral placed for sleep medicine.     : Please call pt and schedule with WAK in the next week or so. Thank you!     WAK: pt is wanting to know if you want to do anything about her kidney numbers from the hospital.

## 2024-11-07 PROBLEM — R06.09 DOE (DYSPNEA ON EXERTION): Status: ACTIVE | Noted: 2023-05-02

## 2024-11-07 PROBLEM — R00.2 PALPITATIONS: Status: ACTIVE | Noted: 2024-11-07

## 2024-11-07 PROBLEM — R00.0 RACING HEART BEAT: Status: ACTIVE | Noted: 2024-11-07

## 2024-11-07 NOTE — PROGRESS NOTES
[facility]      CARDIAC FOLLOW UP  [phone]  11/20/24  Referring: Dr. Izaguirre, Alma Delia Garcia MD (PCP)    REASON FOR CONSULT/CHIEF COMPLAINT/HPI     Reason for visit/ Chief complaint  Prinzmetal's angina, diastolic heart failure  New symptoms of palpitations/dyspnea     HPI Maddi Sanford is a 59 y.o. female here for follow up.     I initially met in 2023.  She had a normal cath in 2015 but has had frequent angina in the past that was typical.  I repeated a stress test, which was normal, and determined that her angina was likely due to intermittent vasospasm versus microvascular disease.    Her Prinzmetal's symptoms have been well-controlled on Ranexa and amlodipine.  Of note, she did not tolerate Imdur due to severe headaches.    She has had some issues with balance and is following with neurology.    On 10/15 I saw her in office for follow up and she reported that she has been having mild chest pain and shortness of breath.  Reports that she has felt her heart fluttering and almost like it is skipping a beat.  She has been having dizziness, feeling her heart race and like she is going to faint. I started her on Metoprolol and had a monitor placed.     On 11/1 she called the office with chest pain and not feeling well and was recommended to go to the ED. It was noted that she recently was started on Qulipta daily. This is an immune modulator to treat migraines. It seems that since she started that medication she has had difficulty with chest heaviness, just not feeling well, headaches and noticed her BP being elevated. it is possible it has led to some hypertension or perhaps made amlodipine less effective. She was advised to discontinue and see how she felt. She wants to seek opinion from a neurologist who can treat her migraine headaches, possibly with other measures besides pharmacologic measures. Possibly consider Botox, which has worked in the past for her headaches.      Today she reports

## 2024-11-12 ENCOUNTER — HOSPITAL ENCOUNTER (OUTPATIENT)
Dept: ONCOLOGY | Age: 59
Setting detail: INFUSION SERIES
Discharge: HOME | End: 2024-11-12
Payer: COMMERCIAL

## 2024-11-12 VITALS
HEART RATE: 72 BPM | RESPIRATION RATE: 16 BRPM | TEMPERATURE: 98.2 F | SYSTOLIC BLOOD PRESSURE: 138 MMHG | DIASTOLIC BLOOD PRESSURE: 81 MMHG

## 2024-11-12 DIAGNOSIS — K90.9 IRON MALABSORPTION: ICD-10-CM

## 2024-11-12 DIAGNOSIS — I51.89 DIASTOLIC DYSFUNCTION: Primary | ICD-10-CM

## 2024-11-12 DIAGNOSIS — D50.8 IRON DEFICIENCY ANEMIA SECONDARY TO INADEQUATE DIETARY IRON INTAKE: ICD-10-CM

## 2024-11-12 PROCEDURE — 96365 THER/PROPH/DIAG IV INF INIT: CPT

## 2024-11-12 PROCEDURE — 6360000002 HC RX W HCPCS: Performed by: INTERNAL MEDICINE

## 2024-11-12 PROCEDURE — 2580000003 HC RX 258: Performed by: INTERNAL MEDICINE

## 2024-11-12 PROCEDURE — 99211 OFF/OP EST MAY X REQ PHY/QHP: CPT

## 2024-11-12 RX ORDER — SODIUM CHLORIDE 0.9 % (FLUSH) 0.9 %
5-40 SYRINGE (ML) INJECTION PRN
Status: CANCELLED | OUTPATIENT
Start: 2024-11-19

## 2024-11-12 RX ORDER — SODIUM CHLORIDE 9 MG/ML
5-250 INJECTION, SOLUTION INTRAVENOUS PRN
Status: DISCONTINUED | OUTPATIENT
Start: 2024-11-12 | End: 2024-11-13 | Stop reason: HOSPADM

## 2024-11-12 RX ORDER — SODIUM CHLORIDE 9 MG/ML
5-250 INJECTION, SOLUTION INTRAVENOUS PRN
Status: CANCELLED | OUTPATIENT
Start: 2024-11-19

## 2024-11-12 RX ORDER — SODIUM CHLORIDE 0.9 % (FLUSH) 0.9 %
5-40 SYRINGE (ML) INJECTION PRN
Status: DISCONTINUED | OUTPATIENT
Start: 2024-11-12 | End: 2024-11-13 | Stop reason: HOSPADM

## 2024-11-12 RX ADMIN — SODIUM CHLORIDE 110 ML/HR: 9 INJECTION, SOLUTION INTRAVENOUS at 08:48

## 2024-11-12 RX ADMIN — SODIUM CHLORIDE 125 MG: 9 INJECTION, SOLUTION INTRAVENOUS at 08:48

## 2024-11-12 NOTE — PROGRESS NOTES
Patient to infusion center for first of five doses of Ferrlecit.  Patient given printed AVS and told about most common side effects.  Patient tolerated infusion well.  Patient to return next week for same treatment.  Patient discharged ambulatory with walker to home with family.

## 2024-11-14 ENCOUNTER — HOSPITAL ENCOUNTER (OUTPATIENT)
Dept: WOMENS IMAGING | Age: 59
Discharge: HOME | End: 2024-11-14
Payer: COMMERCIAL

## 2024-11-14 VITALS — BODY MASS INDEX: 43.32 KG/M2 | HEIGHT: 65 IN | WEIGHT: 260 LBS

## 2024-11-14 DIAGNOSIS — Z12.31 VISIT FOR SCREENING MAMMOGRAM: ICD-10-CM

## 2024-11-14 PROCEDURE — 77063 BREAST TOMOSYNTHESIS BI: CPT

## 2024-11-19 ENCOUNTER — HOSPITAL ENCOUNTER (OUTPATIENT)
Dept: ONCOLOGY | Age: 59
Setting detail: INFUSION SERIES
Discharge: HOME | End: 2024-11-19
Payer: COMMERCIAL

## 2024-11-19 VITALS
RESPIRATION RATE: 16 BRPM | DIASTOLIC BLOOD PRESSURE: 86 MMHG | HEART RATE: 68 BPM | SYSTOLIC BLOOD PRESSURE: 139 MMHG | TEMPERATURE: 97.8 F

## 2024-11-19 DIAGNOSIS — I51.89 DIASTOLIC DYSFUNCTION: ICD-10-CM

## 2024-11-19 DIAGNOSIS — K90.9 IRON MALABSORPTION: ICD-10-CM

## 2024-11-19 DIAGNOSIS — D50.8 IRON DEFICIENCY ANEMIA SECONDARY TO INADEQUATE DIETARY IRON INTAKE: ICD-10-CM

## 2024-11-19 PROCEDURE — 6360000002 HC RX W HCPCS: Performed by: INTERNAL MEDICINE

## 2024-11-19 PROCEDURE — 99211 OFF/OP EST MAY X REQ PHY/QHP: CPT

## 2024-11-19 PROCEDURE — 96365 THER/PROPH/DIAG IV INF INIT: CPT

## 2024-11-19 PROCEDURE — 2580000003 HC RX 258: Performed by: INTERNAL MEDICINE

## 2024-11-19 RX ORDER — SODIUM CHLORIDE 0.9 % (FLUSH) 0.9 %
5-40 SYRINGE (ML) INJECTION EVERY 12 HOURS SCHEDULED
Status: DISCONTINUED | OUTPATIENT
Start: 2024-11-19 | End: 2024-11-20 | Stop reason: HOSPADM

## 2024-11-19 RX ORDER — SODIUM CHLORIDE 9 MG/ML
INJECTION, SOLUTION INTRAVENOUS PRN
Status: DISCONTINUED | OUTPATIENT
Start: 2024-11-19 | End: 2024-11-20 | Stop reason: HOSPADM

## 2024-11-19 RX ORDER — SODIUM CHLORIDE 9 MG/ML
5-250 INJECTION, SOLUTION INTRAVENOUS PRN
Status: ACTIVE | OUTPATIENT
Start: 2024-11-19 | End: 2024-11-20

## 2024-11-19 RX ORDER — SODIUM CHLORIDE 0.9 % (FLUSH) 0.9 %
5-40 SYRINGE (ML) INJECTION PRN
Status: DISCONTINUED | OUTPATIENT
Start: 2024-11-19 | End: 2024-11-19

## 2024-11-19 RX ORDER — SODIUM CHLORIDE 9 MG/ML
5-250 INJECTION, SOLUTION INTRAVENOUS PRN
Status: DISCONTINUED | OUTPATIENT
Start: 2024-11-19 | End: 2024-11-19

## 2024-11-19 RX ADMIN — SODIUM CHLORIDE: 9 INJECTION, SOLUTION INTRAVENOUS at 09:00

## 2024-11-19 RX ADMIN — SODIUM CHLORIDE 125 MG: 9 INJECTION, SOLUTION INTRAVENOUS at 09:01

## 2024-11-19 RX ADMIN — SODIUM CHLORIDE, PRESERVATIVE FREE 10 ML: 5 INJECTION INTRAVENOUS at 09:01

## 2024-11-19 NOTE — PROGRESS NOTES
Patient to infusion center for second of five doses of Ferrlecit.  Patient denies need for printed AVS, received last week and told about most common side effects.  Patient tolerated infusion well.  Patient to return next week for same treatment.  Patient discharged ambulatory with walker to home with family.

## 2024-11-20 ENCOUNTER — OFFICE VISIT (OUTPATIENT)
Dept: CARDIOLOGY CLINIC | Age: 59
End: 2024-11-20
Payer: COMMERCIAL

## 2024-11-20 ENCOUNTER — TELEPHONE (OUTPATIENT)
Dept: ADMINISTRATIVE | Age: 59
End: 2024-11-20

## 2024-11-20 VITALS
DIASTOLIC BLOOD PRESSURE: 80 MMHG | BODY MASS INDEX: 46.15 KG/M2 | SYSTOLIC BLOOD PRESSURE: 124 MMHG | HEART RATE: 66 BPM | OXYGEN SATURATION: 96 % | WEIGHT: 277 LBS | HEIGHT: 65 IN

## 2024-11-20 DIAGNOSIS — I51.89 DIASTOLIC DYSFUNCTION: ICD-10-CM

## 2024-11-20 DIAGNOSIS — R00.0 RACING HEART BEAT: ICD-10-CM

## 2024-11-20 DIAGNOSIS — R42 DIZZINESS: ICD-10-CM

## 2024-11-20 DIAGNOSIS — I10 ESSENTIAL HYPERTENSION: ICD-10-CM

## 2024-11-20 DIAGNOSIS — G47.33 OBSTRUCTIVE SLEEP APNEA SYNDROME: ICD-10-CM

## 2024-11-20 DIAGNOSIS — I20.1 PRINZMETAL ANGINA (HCC): ICD-10-CM

## 2024-11-20 DIAGNOSIS — E66.01 CLASS 3 SEVERE OBESITY WITH BODY MASS INDEX (BMI) OF 45.0 TO 49.9 IN ADULT, UNSPECIFIED OBESITY TYPE, UNSPECIFIED WHETHER SERIOUS COMORBIDITY PRESENT: ICD-10-CM

## 2024-11-20 DIAGNOSIS — R00.2 PALPITATIONS: ICD-10-CM

## 2024-11-20 DIAGNOSIS — R06.09 DOE (DYSPNEA ON EXERTION): Primary | ICD-10-CM

## 2024-11-20 DIAGNOSIS — E66.813 CLASS 3 SEVERE OBESITY WITH BODY MASS INDEX (BMI) OF 45.0 TO 49.9 IN ADULT, UNSPECIFIED OBESITY TYPE, UNSPECIFIED WHETHER SERIOUS COMORBIDITY PRESENT: ICD-10-CM

## 2024-11-20 PROCEDURE — 3079F DIAST BP 80-89 MM HG: CPT | Performed by: INTERNAL MEDICINE

## 2024-11-20 PROCEDURE — 3074F SYST BP LT 130 MM HG: CPT | Performed by: INTERNAL MEDICINE

## 2024-11-20 PROCEDURE — 99214 OFFICE O/P EST MOD 30 MIN: CPT | Performed by: INTERNAL MEDICINE

## 2024-11-20 RX ORDER — SEMAGLUTIDE 0.25 MG/.5ML
0.25 INJECTION, SOLUTION SUBCUTANEOUS
Qty: 4 ML | Refills: 0 | Status: SHIPPED | OUTPATIENT
Start: 2024-11-20 | End: 2024-11-21

## 2024-11-20 RX ORDER — ONDANSETRON 4 MG/1
4 TABLET, ORALLY DISINTEGRATING ORAL 3 TIMES DAILY PRN
Qty: 270 TABLET | Refills: 1 | Status: SHIPPED | OUTPATIENT
Start: 2024-11-20

## 2024-11-20 NOTE — TELEPHONE ENCOUNTER
Submitted PA for Wegovy 0.25MG/0.5ML auto-injectors  Via CMM Key: [id number] STATUS: PENDING.    Follow up done daily; if no decision with in three days we will refax.  If another three days goes by with no decision will call the insurance for status.

## 2024-11-20 NOTE — PATIENT INSTRUCTIONS
Follow up with Dr Bhatia in 3 months     Start: Wegovy 0.25mg weekly.   Start: Zofran 4mg up to 3 times daily as needed for nausea.     Call for any questions or concerns.

## 2024-11-21 ENCOUNTER — TELEPHONE (OUTPATIENT)
Dept: CARDIOLOGY CLINIC | Age: 59
End: 2024-11-21

## 2024-11-21 NOTE — TELEPHONE ENCOUNTER
Last ov:24 [facility]  Next ov:24 [facility]AK  Last EK24  Last labs:24  Last filled:   Disp Refills Start End    empagliflozin (JARDIANCE) 10 MG tablet 90 tablet 3 10/16/2023 --    Sig - Route: Take 1 tablet by mouth daily - Oral    Sent to pharmacy as: Empagliflozin 10 MG Oral Tablet (Jardiance)    E-Prescribing Status: Receipt confirmed by pharmacy (10/16/2023  3:33 PM EDT)

## 2024-11-21 NOTE — TELEPHONE ENCOUNTER
Spoke to pt about message below. She verbalized understanding. Pt requesting rx to be sent to [facility] as previously discussed.

## 2024-11-21 NOTE — TELEPHONE ENCOUNTER
Prescription called into [facility].  I spoke to Pharmacist, Randall and provided verbal order for Semaglutide 0.25 mg weekly.  Okay to compound with B12. 4 mL with 1 refill.  Verbal read back provided.      I called Maddi to notify of the above.  No answer, voicemail left.  Requested a return with questions and/or concerns.

## 2024-11-21 NOTE — TELEPHONE ENCOUNTER
Medication Refill    Medication needing refilled:  empagliflozin (JARDIANCE   Dosage of the medication:  10 MG tablet   How are you taking this medication (QD, BID, TID, QID, PRN):    30 or 90 day supply called in:  90  When will you run out of your medication:    Which Pharmacy are we sending the medication to?:  [facility] P [phone] - F [phone]  [facility] [place] [address]  Phone: [phone]  Fax: [phone]

## 2024-11-26 ENCOUNTER — HOSPITAL ENCOUNTER (OUTPATIENT)
Dept: ONCOLOGY | Age: 59
Setting detail: INFUSION SERIES
Discharge: HOME | End: 2024-11-26
Payer: COMMERCIAL

## 2024-11-26 VITALS
SYSTOLIC BLOOD PRESSURE: 132 MMHG | RESPIRATION RATE: 16 BRPM | DIASTOLIC BLOOD PRESSURE: 81 MMHG | TEMPERATURE: 99.1 F | HEART RATE: 71 BPM

## 2024-11-26 DIAGNOSIS — D50.8 IRON DEFICIENCY ANEMIA SECONDARY TO INADEQUATE DIETARY IRON INTAKE: ICD-10-CM

## 2024-11-26 DIAGNOSIS — I51.89 DIASTOLIC DYSFUNCTION: Primary | ICD-10-CM

## 2024-11-26 DIAGNOSIS — K90.9 IRON MALABSORPTION: ICD-10-CM

## 2024-11-26 PROCEDURE — 2580000003 HC RX 258: Performed by: INTERNAL MEDICINE

## 2024-11-26 PROCEDURE — 6360000002 HC RX W HCPCS: Performed by: INTERNAL MEDICINE

## 2024-11-26 PROCEDURE — 96365 THER/PROPH/DIAG IV INF INIT: CPT

## 2024-11-26 PROCEDURE — 99211 OFF/OP EST MAY X REQ PHY/QHP: CPT

## 2024-11-26 RX ORDER — SODIUM CHLORIDE 0.9 % (FLUSH) 0.9 %
5-40 SYRINGE (ML) INJECTION PRN
Status: DISCONTINUED | OUTPATIENT
Start: 2024-11-26 | End: 2024-11-27 | Stop reason: HOSPADM

## 2024-11-26 RX ORDER — SODIUM CHLORIDE 0.9 % (FLUSH) 0.9 %
5-40 SYRINGE (ML) INJECTION PRN
Status: CANCELLED | OUTPATIENT
Start: 2024-12-03

## 2024-11-26 RX ORDER — SODIUM CHLORIDE 9 MG/ML
5-250 INJECTION, SOLUTION INTRAVENOUS PRN
Status: DISCONTINUED | OUTPATIENT
Start: 2024-11-26 | End: 2024-11-27 | Stop reason: HOSPADM

## 2024-11-26 RX ORDER — SODIUM CHLORIDE 9 MG/ML
5-250 INJECTION, SOLUTION INTRAVENOUS PRN
Status: CANCELLED | OUTPATIENT
Start: 2024-12-03

## 2024-11-26 RX ADMIN — SODIUM CHLORIDE 110 ML/HR: 9 INJECTION, SOLUTION INTRAVENOUS at 08:34

## 2024-11-26 RX ADMIN — SODIUM CHLORIDE 125 MG: 9 INJECTION, SOLUTION INTRAVENOUS at 08:36

## 2024-11-27 ENCOUNTER — TELEPHONE (OUTPATIENT)
Dept: CARDIOLOGY CLINIC | Age: 59
End: 2024-11-27

## 2024-11-27 NOTE — TELEPHONE ENCOUNTER
Pt called to get pre-approved for   Semaglutide, it needs to say:  that a Pt has an initial body mass index of at least 30 kg/m2    Medication Refill    Medication needing refilled:  Semaglutide    Dosage of the medication:  4 mL     How are you taking this medication (QD, BID, TID, QID, PRN):   Inject 0.25 mg into the skin once a week Okay to compound with B12     30 or 90 day supply called in:     When will you run out of your medication:    Which Pharmacy are we sending the medication to?:     [facility]  [facility]  [place] [address]  Phone: [phone]  Fax: [phone]     NOTE:  Pt is asking for Paige to call her to discuss this further.  Please verify Pt Pharmacy.  Thank you

## 2024-11-29 NOTE — TELEPHONE ENCOUNTER
Pt called to request for franky Louis call her to discuss what her insurance stated about the Semaglutide.  Please advise.  Thank you

## 2024-12-02 NOTE — TELEPHONE ENCOUNTER
Spoke to Maddi. Letter states denied due to BMI not greater than 35. Please fax most recent office note to the insurance company to review for correct BMI. Patient will call insurance tomorrow to follow up. Thank you!

## 2024-12-03 ENCOUNTER — HOSPITAL ENCOUNTER (OUTPATIENT)
Dept: ONCOLOGY | Age: 59
Setting detail: INFUSION SERIES
Discharge: HOME | End: 2024-12-03
Payer: COMMERCIAL

## 2024-12-03 VITALS
SYSTOLIC BLOOD PRESSURE: 139 MMHG | DIASTOLIC BLOOD PRESSURE: 77 MMHG | HEART RATE: 74 BPM | TEMPERATURE: 96.7 F | RESPIRATION RATE: 16 BRPM

## 2024-12-03 DIAGNOSIS — I51.89 DIASTOLIC DYSFUNCTION: Primary | ICD-10-CM

## 2024-12-03 DIAGNOSIS — D50.8 IRON DEFICIENCY ANEMIA SECONDARY TO INADEQUATE DIETARY IRON INTAKE: ICD-10-CM

## 2024-12-03 DIAGNOSIS — K90.9 IRON MALABSORPTION: ICD-10-CM

## 2024-12-03 PROCEDURE — 2580000003 HC RX 258: Performed by: INTERNAL MEDICINE

## 2024-12-03 PROCEDURE — 99211 OFF/OP EST MAY X REQ PHY/QHP: CPT

## 2024-12-03 PROCEDURE — 6360000002 HC RX W HCPCS: Performed by: INTERNAL MEDICINE

## 2024-12-03 PROCEDURE — 96365 THER/PROPH/DIAG IV INF INIT: CPT

## 2024-12-03 RX ORDER — SODIUM CHLORIDE 0.9 % (FLUSH) 0.9 %
5-40 SYRINGE (ML) INJECTION PRN
Status: CANCELLED | OUTPATIENT
Start: 2024-12-10

## 2024-12-03 RX ORDER — SODIUM CHLORIDE 9 MG/ML
5-250 INJECTION, SOLUTION INTRAVENOUS PRN
Status: DISCONTINUED | OUTPATIENT
Start: 2024-12-03 | End: 2024-12-04 | Stop reason: HOSPADM

## 2024-12-03 RX ORDER — SODIUM CHLORIDE 0.9 % (FLUSH) 0.9 %
5-40 SYRINGE (ML) INJECTION PRN
Status: DISCONTINUED | OUTPATIENT
Start: 2024-12-03 | End: 2024-12-04 | Stop reason: HOSPADM

## 2024-12-03 RX ORDER — SODIUM CHLORIDE 9 MG/ML
5-250 INJECTION, SOLUTION INTRAVENOUS PRN
Status: CANCELLED | OUTPATIENT
Start: 2024-12-10

## 2024-12-03 RX ADMIN — SODIUM CHLORIDE 125 MG: 9 INJECTION, SOLUTION INTRAVENOUS at 08:57

## 2024-12-03 RX ADMIN — SODIUM CHLORIDE 110 ML/HR: 9 INJECTION, SOLUTION INTRAVENOUS at 08:56

## 2024-12-09 ENCOUNTER — TELEPHONE (OUTPATIENT)
Dept: CARDIOLOGY CLINIC | Age: 59
End: 2024-12-09

## 2024-12-09 DIAGNOSIS — I44.1 2ND DEGREE AV BLOCK: Primary | ICD-10-CM

## 2024-12-09 NOTE — TELEPHONE ENCOUNTER
----- Message from Dr. 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  Patient says she is usually still awake at that time.  No symptoms.    I called her and discussed that she needs to see EP as this might be an indication for a pacemaker or at least an ILR.    Please place referral for EP.  Thanks!    -Roddy

## 2024-12-11 ENCOUNTER — HOSPITAL ENCOUNTER (OUTPATIENT)
Dept: ONCOLOGY | Age: 59
Setting detail: INFUSION SERIES
Discharge: HOME | End: 2024-12-11
Payer: COMMERCIAL

## 2024-12-11 ENCOUNTER — TELEPHONE (OUTPATIENT)
Dept: CARDIOLOGY CLINIC | Age: 59
End: 2024-12-11

## 2024-12-11 VITALS
TEMPERATURE: 98.7 F | SYSTOLIC BLOOD PRESSURE: 129 MMHG | RESPIRATION RATE: 16 BRPM | DIASTOLIC BLOOD PRESSURE: 77 MMHG | HEART RATE: 68 BPM

## 2024-12-11 DIAGNOSIS — K90.9 IRON MALABSORPTION: ICD-10-CM

## 2024-12-11 DIAGNOSIS — D50.8 IRON DEFICIENCY ANEMIA SECONDARY TO INADEQUATE DIETARY IRON INTAKE: ICD-10-CM

## 2024-12-11 DIAGNOSIS — I51.89 DIASTOLIC DYSFUNCTION: Primary | ICD-10-CM

## 2024-12-11 PROCEDURE — 99211 OFF/OP EST MAY X REQ PHY/QHP: CPT

## 2024-12-11 PROCEDURE — 6360000002 HC RX W HCPCS: Performed by: INTERNAL MEDICINE

## 2024-12-11 PROCEDURE — 2580000003 HC RX 258: Performed by: INTERNAL MEDICINE

## 2024-12-11 PROCEDURE — 96365 THER/PROPH/DIAG IV INF INIT: CPT

## 2024-12-11 RX ORDER — SODIUM CHLORIDE 9 MG/ML
5-250 INJECTION, SOLUTION INTRAVENOUS PRN
Status: DISCONTINUED | OUTPATIENT
Start: 2024-12-11 | End: 2024-12-11

## 2024-12-11 RX ORDER — SODIUM CHLORIDE 9 MG/ML
5-250 INJECTION, SOLUTION INTRAVENOUS PRN
Status: CANCELLED | OUTPATIENT
Start: 2024-12-17

## 2024-12-11 RX ORDER — SODIUM CHLORIDE 0.9 % (FLUSH) 0.9 %
5-40 SYRINGE (ML) INJECTION PRN
Status: CANCELLED | OUTPATIENT
Start: 2024-12-17

## 2024-12-11 RX ORDER — SODIUM CHLORIDE 0.9 % (FLUSH) 0.9 %
5-40 SYRINGE (ML) INJECTION PRN
Status: DISCONTINUED | OUTPATIENT
Start: 2024-12-11 | End: 2024-12-11

## 2024-12-11 RX ADMIN — SODIUM CHLORIDE 110 ML/HR: 9 INJECTION, SOLUTION INTRAVENOUS at 08:19

## 2024-12-11 RX ADMIN — SODIUM CHLORIDE 125 MG: 9 INJECTION, SOLUTION INTRAVENOUS at 08:20

## 2024-12-11 ASSESSMENT — SLEEP AND FATIGUE QUESTIONNAIRES
HOW LIKELY ARE YOU TO NOD OFF OR FALL ASLEEP WHEN YOU ARE A PASSENGER IN A CAR FOR AN HOUR WITHOUT A BREAK: MODERATE CHANCE OF DOZING
HOW LIKELY ARE YOU TO NOD OFF OR FALL ASLEEP IN A CAR, WHILE STOPPED FOR A FEW MINUTES IN TRAFFIC: WOULD NEVER DOZE
HOW LIKELY ARE YOU TO NOD OFF OR FALL ASLEEP WHILE SITTING QUIETLY AFTER LUNCH WITHOUT ALCOHOL: SLIGHT CHANCE OF DOZING
HOW LIKELY ARE YOU TO NOD OFF OR FALL ASLEEP WHILE SITTING AND TALKING TO SOMEONE: WOULD NEVER DOZE
HOW LIKELY ARE YOU TO NOD OFF OR FALL ASLEEP WHILE SITTING AND READING: SLIGHT CHANCE OF DOZING
HOW LIKELY ARE YOU TO NOD OFF OR FALL ASLEEP WHILE SITTING INACTIVE IN A PUBLIC PLACE: WOULD NEVER DOZE
ESS TOTAL SCORE: 8
HOW LIKELY ARE YOU TO NOD OFF OR FALL ASLEEP WHILE WATCHING TV: SLIGHT CHANCE OF DOZING
HOW LIKELY ARE YOU TO NOD OFF OR FALL ASLEEP WHILE LYING DOWN TO REST IN THE AFTERNOON WHEN CIRCUMSTANCES PERMIT: HIGH CHANCE OF DOZING

## 2024-12-11 NOTE — PROGRESS NOTES
Patient to infusion center for fifth of five doses of Ferrlecit.  Patient denies need for printed AVS, received last week and told about most common side effects.  Patient tolerated infusion well.  Patient to return next week for same treatment.  Patient discharged ambulatory with walker to home with family.

## 2024-12-11 NOTE — PROGRESS NOTES
"Please inform patient that as long as the medication does not contain pseudoephedrine, phenylephrine, and ephedrine, it is safe to use.  Try to avoid any products that contain \"DM\" in them  Coricidin HBP is a safe alternative" Maddi Sanford   1965, 59 y.o. female   [id number]     RIGHT EAR: /MODEL: Phonak P70-R  SN: [id number]  EARMOLD/TUBING/WIRE/DOME: 1M small power    LEFT EAR: /MODEL: Phonak P70-R  SN: [id number]  EARMOLD/TUBING/WIRE/DOME: 1M small vented    ACCESSORIES:none  HAF: 10/2/24  WARRANTY: 9/18/27  TRIAL PERIOD ENDS:11/2/24  L&D ELIGIBLE: Yes     BUTTONS: ENABLED  PROGRAMS: ENABLED  PHONE CONNECTIVITY: paired      HEARING AID CHECK - FIRST FOLLOW-UP      Maddi Sanford was seen today, 11/5/2024, for a hearing aid check appointment.  Patient noted she has overall been doing well with her hearing aids.  She feels that hearing aids are comfortable at this time.  She has not noted any concern for length of  wire.  She did note that right hearing aid seems louder than left hearing aid.  She attempted to connect to her phone at home, however, she was not successful.    PROCEDURES:     Connected devices to fitting software.  Adjusted gain between hearing aids to be more balanced by reducing right hearing aid gain and increasing left hearing aid gain for comfort.  Decreased low-frequency and high-frequency gain for comfort of environmental sounds.  Patient reported she feels comfortable with changing domes and wax traps, as she has used to the Cerustop style.  She declined changing  length at this time.  Datalogging revealed over 9 hours of use per day.  Paired devices to her cell phone and throughout.  She was happy with the sound quality of stream and signal.  She noted she listens to the Bible regularly at home through her phone while others are watching television.  PATIENT EDUCATION:      -Reviewed general hearing aid care and maintenance.    - Reviewed phone connectivity.  She was able to use fercho and stream audio to that hearing aids.  RECOMMENDATIONS:     Continue consistent hearing aid use.  Return for hearing aid checks as needed; next hearing aid check appointment was

## 2024-12-11 NOTE — TELEPHONE ENCOUNTER
Received message from [employer] admin team patient requesting refill on medication below.        Refills have been requested for the following medications:         Semaglutide,0.25 or 0.5MG/DOS, 2 MG/1.5ML SOPN [Dr. Dave Bhatia MD]      Patient Comment: I’m tolerating the .25 dose with no problems. I take my last shot on Monday. Can we increase the dosage? If so, please call it in to [facility]. Thank you!      Preferred pharmacy: [facility] - [place] [address] - P [phone] - F [phone]

## 2024-12-14 SDOH — HEALTH STABILITY: PHYSICAL HEALTH: ON AVERAGE, HOW MANY MINUTES DO YOU ENGAGE IN EXERCISE AT THIS LEVEL?: 20 MIN

## 2024-12-14 SDOH — HEALTH STABILITY: PHYSICAL HEALTH: ON AVERAGE, HOW MANY DAYS PER WEEK DO YOU ENGAGE IN MODERATE TO STRENUOUS EXERCISE (LIKE A BRISK WALK)?: 1 DAY

## 2024-12-23 SDOH — HEALTH STABILITY: PHYSICAL HEALTH: ON AVERAGE, HOW MANY MINUTES DO YOU ENGAGE IN EXERCISE AT THIS LEVEL?: 20 MIN

## 2024-12-23 SDOH — HEALTH STABILITY: PHYSICAL HEALTH: ON AVERAGE, HOW MANY DAYS PER WEEK DO YOU ENGAGE IN MODERATE TO STRENUOUS EXERCISE (LIKE A BRISK WALK)?: 1 DAY

## 2024-12-26 ENCOUNTER — OFFICE VISIT (OUTPATIENT)
Dept: ORTHOPEDIC SURGERY | Age: 59
End: 2024-12-26

## 2024-12-26 VITALS — WEIGHT: 277 LBS | BODY MASS INDEX: 46.15 KG/M2 | HEIGHT: 65 IN

## 2024-12-26 DIAGNOSIS — M25.562 LEFT KNEE PAIN, UNSPECIFIED CHRONICITY: Primary | ICD-10-CM

## 2024-12-26 RX ORDER — METHYLPREDNISOLONE ACETATE 40 MG/ML
40 INJECTION, SUSPENSION INTRA-ARTICULAR; INTRALESIONAL; INTRAMUSCULAR; SOFT TISSUE ONCE
Status: COMPLETED | OUTPATIENT
Start: 2024-12-26 | End: 2024-12-26

## 2024-12-26 RX ORDER — BUPIVACAINE HYDROCHLORIDE 5 MG/ML
4 INJECTION, SOLUTION PERINEURAL ONCE
Status: COMPLETED | OUTPATIENT
Start: 2024-12-26 | End: 2024-12-26

## 2024-12-26 RX ADMIN — BUPIVACAINE HYDROCHLORIDE 20 MG: 5 INJECTION, SOLUTION PERINEURAL at 13:57

## 2024-12-26 RX ADMIN — METHYLPREDNISOLONE ACETATE 40 MG: 40 INJECTION, SUSPENSION INTRA-ARTICULAR; INTRALESIONAL; INTRAMUSCULAR; SOFT TISSUE at 13:58

## 2024-12-28 NOTE — PROGRESS NOTES
2024     Reason for visit:  Left knee pain    History of Present Illness:  Patient is a 59-year-old female who presents for evaluation of her left knee.  She reports on and off pain for quite some time with recent worsening.  No traumatic injury.  The pain is diffuse about the knee and made worse with standing, walking, stairs.  Occasional swelling.  No catching or locking.    Objective:  Ht 1.651 m (5' 5\")   Wt 125.6 kg (277 lb)   BMI 46.10 kg/m²      Physical Exam:  The patient is well-appearing and in no apparent distress  Examination of the left knee   There is a small effusion, no gross deformity or skin changes  Range of motion reveals 0 to 125  Neg lachman, negative posterior drawer, no pain or laxity with varus or valgus stress at 0 degrees and 30 degrees of flexion  + joint line tenderness  5 out of 5 strength throughout distal muscle groups  Sensation is intact to light touch throughout all distributions  There is no calf swelling or tenderness  Palpable DP pulse, brisk cap refill, 2+ symmetric reflexes     Imagin view x-rays of the left knee were obtained the office today on 2024 reviewed.  Is no fracture or dislocation.  Tricompartmental degenerative changes are present which are most pronounced in the patellofemoral medial compartments where there is joint space loss and osteophyte formation.      Assessment:  Left knee degenerative joint disease    Plan:  I discussed with the patient the diagnosis and treatment options.  We discussed operative and nonoperative management.  At this point I do recommend nonoperative management.  Nonoperative treatment options include activity modification, anti-inflammatory medications, physical therapy, and injections. The patient elected proceed with cortisone injection.  Therefore injection was given to the left knee via sterile technique.  The injection consisted of 40 mg of Depo-Medrol combined with 4 mL of 0.5% Marcaine.  The patient

## 2024-12-30 ENCOUNTER — HOSPITAL ENCOUNTER (OUTPATIENT)
Age: 59
Discharge: HOME | End: 2024-12-30
Payer: COMMERCIAL

## 2024-12-30 DIAGNOSIS — R06.09 DOE (DYSPNEA ON EXERTION): ICD-10-CM

## 2024-12-30 DIAGNOSIS — R00.2 PALPITATIONS: ICD-10-CM

## 2024-12-30 DIAGNOSIS — R00.0 RACING HEART BEAT: ICD-10-CM

## 2024-12-30 DIAGNOSIS — I51.89 DIASTOLIC DYSFUNCTION: ICD-10-CM

## 2024-12-30 LAB
ALBUMIN SERPL-MCNC: 3.9 G/DL (ref 3.4–5)
ALBUMIN/GLOB SERPL: 1.7 {RATIO} (ref 1.1–2.2)
ALP SERPL-CCNC: 79 U/L (ref 40–129)
ALT SERPL-CCNC: 17 U/L (ref 10–40)
ANION GAP SERPL CALCULATED.3IONS-SCNC: 10 MMOL/L (ref 3–16)
AST SERPL-CCNC: 22 U/L (ref 15–37)
BILIRUB SERPL-MCNC: 0.3 MG/DL (ref 0–1)
BUN SERPL-MCNC: 14 MG/DL (ref 7–20)
CALCIUM SERPL-MCNC: 9.4 MG/DL (ref 8.3–10.6)
CHLORIDE SERPL-SCNC: 107 MMOL/L (ref 99–110)
CO2 SERPL-SCNC: 26 MMOL/L (ref 21–32)
CREAT SERPL-MCNC: 0.7 MG/DL (ref 0.6–1.2)
DEPRECATED RDW RBC AUTO: 14 % (ref 12.4–15.4)
FERRITIN SERPL IA-MCNC: 202 NG/ML (ref 15–150)
GFR SERPLBLD CREATININE-BSD FMLA CKD-EPI: >90 ML/MIN/{1.73_M2}
GLUCOSE SERPL-MCNC: 127 MG/DL (ref 70–99)
HCT VFR BLD AUTO: 44.1 % (ref 36–48)
HGB BLD-MCNC: 14.9 G/DL (ref 12–16)
IRON SATN MFR SERPL: 23 % (ref 15–50)
IRON SERPL-MCNC: 73 UG/DL (ref 37–145)
MCH RBC QN AUTO: 30.6 PG (ref 26–34)
MCHC RBC AUTO-ENTMCNC: 33.9 G/DL (ref 31–36)
MCV RBC AUTO: 90.3 FL (ref 80–100)
NT-PROBNP SERPL-MCNC: 61 PG/ML (ref 0–124)
PLATELET # BLD AUTO: 286 K/UL (ref 135–450)
PMV BLD AUTO: 10.1 FL (ref 5–10.5)
POTASSIUM SERPL-SCNC: 4.2 MMOL/L (ref 3.5–5.1)
PROT SERPL-MCNC: 6.2 G/DL (ref 6.4–8.2)
RBC # BLD AUTO: 4.88 M/UL (ref 4–5.2)
SODIUM SERPL-SCNC: 143 MMOL/L (ref 136–145)
TIBC SERPL-MCNC: 311 UG/DL (ref 260–445)
WBC # BLD AUTO: 6.5 K/UL (ref 4–11)

## 2024-12-30 PROCEDURE — 83540 ASSAY OF IRON: CPT

## 2024-12-30 PROCEDURE — 36415 COLL VENOUS BLD VENIPUNCTURE: CPT

## 2024-12-30 PROCEDURE — 80053 COMPREHEN METABOLIC PANEL: CPT

## 2024-12-30 PROCEDURE — 83550 IRON BINDING TEST: CPT

## 2024-12-30 PROCEDURE — 83880 ASSAY OF NATRIURETIC PEPTIDE: CPT

## 2024-12-30 PROCEDURE — 85027 COMPLETE CBC AUTOMATED: CPT

## 2024-12-30 PROCEDURE — 82728 ASSAY OF FERRITIN: CPT

## 2025-01-07 ENCOUNTER — TELEPHONE (OUTPATIENT)
Dept: CARDIOLOGY CLINIC | Age: 60
End: 2025-01-07

## 2025-01-07 NOTE — TELEPHONE ENCOUNTER
----- Message from Dr. Dave Bhatia MD sent at 1/6/2025  1:53 PM EST -----  Iron levels are adequate - no need for more iron infusions at this time.

## 2025-01-09 ENCOUNTER — OFFICE VISIT (OUTPATIENT)
Dept: ENT CLINIC | Age: 60
End: 2025-01-09
Payer: COMMERCIAL

## 2025-01-09 VITALS
DIASTOLIC BLOOD PRESSURE: 89 MMHG | SYSTOLIC BLOOD PRESSURE: 147 MMHG | OXYGEN SATURATION: 96 % | TEMPERATURE: 97.3 F | WEIGHT: 268 LBS | HEIGHT: 65 IN | BODY MASS INDEX: 44.65 KG/M2 | HEART RATE: 94 BPM

## 2025-01-09 DIAGNOSIS — H93.11 TINNITUS OF RIGHT EAR: ICD-10-CM

## 2025-01-09 DIAGNOSIS — H90.A21 SENSORINEURAL HEARING LOSS (SNHL) OF RIGHT EAR WITH RESTRICTED HEARING OF LEFT EAR: ICD-10-CM

## 2025-01-09 DIAGNOSIS — H81.01 MENIERE'S DISEASE (COCHLEAR HYDROPS), RIGHT: Primary | ICD-10-CM

## 2025-01-09 PROCEDURE — 99213 OFFICE O/P EST LOW 20 MIN: CPT | Performed by: STUDENT IN AN ORGANIZED HEALTH CARE EDUCATION/TRAINING PROGRAM

## 2025-01-09 PROCEDURE — 3079F DIAST BP 80-89 MM HG: CPT | Performed by: STUDENT IN AN ORGANIZED HEALTH CARE EDUCATION/TRAINING PROGRAM

## 2025-01-09 PROCEDURE — 3077F SYST BP >= 140 MM HG: CPT | Performed by: STUDENT IN AN ORGANIZED HEALTH CARE EDUCATION/TRAINING PROGRAM

## 2025-01-09 NOTE — PROGRESS NOTES
[facility]  DIVISION OF OTOLARYNGOLOGY- HEAD & NECK SURGERY  CLINIC FOLLOW-UP VISIT      Patient Name: Maddi Sanford  Medical Record Number:  [id number]  Primary Care Physician:  Alma Delia Izaguirre MD    ChiefComplaint     Chief Complaint   Patient presents with    Follow-up     F/u Meniere's        History of Present Illness     Maddi Sanford is an 59 y.o. female previously seen for squamous papilloma of soft palate status post excision on 11/10/2023, multinodular thyroid, right Ménière's disease, right hearing loss     Interval History:   She is doing well.  No recent vertigo episodes since her last appointment.  No hearing changes.  She recently obtained a hearing aid since her last visit.    Past Medical History     Past Medical History:   Diagnosis Date    Arthritis     Asthma     resolved, no meds    Depression     Edema of right lower leg     Fibromyalgia     Ganglion cyst     GERD (gastroesophageal reflux disease)     H/O drug overdose 2002    suicide attempt, cardiac arrest    Headache(784.0)     Hyperlipemia     Hypertension     IBS (irritable bowel syndrome)     Meniere's disease     Migraines     Palpitations     PONV (postoperative nausea and vomiting)     Prinzmetal angina (HCC)     Sleep apnea     cpap, does not use    Syncope     Thyroid nodule     TIA (transient ischemic attack)     pt states 30 yrs ago    Vertigo     Wears hearing aid     bilateral hearing aids       Past Surgical History     Past Surgical History:   Procedure Laterality Date    ABDOMEN SURGERY      ACHILLES TENDON SURGERY Right 11/20/2023    RIGHT INSERTIONAL ACHILLES TENDONITIS WITH CALCANEUS PARTIAL EXCISION WITH SECONDARY REPAIR OF ACHILLES TENDON performed by Ranjana Fuller MD at [facility] OR    ACHILLES TENDON SURGERY Right 2/22/2024    ACHILLES DEBRIDEMENT, FLEXOR HALLUCIS LONGUS TENDON TRANSFER performed by Pedro Holliday MD at [facility] OR    ANKLE SURGERY Right 2/22/2024    RIGHT HEEL SUTURE ANCHOR REMOVAL, performed by

## 2025-01-13 ENCOUNTER — TELEPHONE (OUTPATIENT)
Dept: CARDIOLOGY CLINIC | Age: 60
End: 2025-01-13

## 2025-01-13 NOTE — TELEPHONE ENCOUNTER
Medication Refill    Medication needing refilled:  Semaglutide   Dosage of the medication:  She just finished the 0.5 and she did not have any issues, she would like the highest does he would like for her to take  How are you taking this medication (QD, BID, TID, QID, PRN):  injection  30 or 90 day supply called in:    When will you run out of your medication:  Used last dose today  Which Pharmacy are we sending the medication to?:  [facility] - [place] [address] [address] - [place] [phone] - F [phone]

## 2025-01-13 NOTE — TELEPHONE ENCOUNTER
Semaglutide 1.0mg weekly sent to  pharmacy. Please call pt to advise and have her call to set up . Thank you!

## 2025-01-20 PROBLEM — I44.30 AV HEART BLOCK: Status: ACTIVE | Noted: 2025-01-20

## 2025-01-20 NOTE — PROGRESS NOTES
[facility]   Electrophysiology Consultation   Date: 1/29/2025  Reason for Consultation: AV Block   Consult Requesting Physician: Dr.William Bhatia     CC: Syncope Abnormal Holter   HPI: Maddi Sanford is a 59 y.o. female history of fibromyalgia, syncope, Prinzmetal angina, HLD, Sleep apnea, TIA, HTN, asthma and depression.     She had a LHC 4/2015 which showed normal Cors but continued to have frequent complaints of chest pain.     Dx with Prinzmetal Angina which is controlled on amlodipine and Ranexa. Did not tolerate Imdur d/t severe headache.    History of Present Illness  The patient presents for evaluation of heart palpitations.    She has been experiencing chest discomfort, described as a fluttering sensation in her heart, akin to a sudden release of tension. She also reports episodes of skipped beats and pauses, during which she experiences chest pressure and difficulty breathing. These symptoms occur intermittently, persisting for several days and recurring multiple times within a day. She reports constant fatigue and breathlessness upon exertion, such as dressing or applying makeup.     She has experienced syncope on four occasions, once resulting in a nasal fracture. The other three episodes occurred recently, with her losing consciousness while ambulating from her bed to the bathroom, kitchen, and living room. She often experiences dizziness, prompting her to sit down or grasp a railing for support. She is a fall risk and falls all the time. She has an appointment with a balance neurologist. She has neuropathy in both lower extremities and left thigh.     She is prediabetic and has Meniere's disease, high blood pressure, cholesterol, and migraines. She is working on losing weight and is on semaglutide, having lost 30 pounds so far in 2.5 months. A Holter monitor was placed for a month, and an echocardiogram was performed. Dr. Bhatia informed her that the results indicated a cessation of cardiac

## 2025-01-21 ASSESSMENT — SLEEP AND FATIGUE QUESTIONNAIRES
HOW LIKELY ARE YOU TO NOD OFF OR FALL ASLEEP IN A CAR, WHILE STOPPED FOR A FEW MINUTES IN TRAFFIC: WOULD NEVER DOZE
HOW LIKELY ARE YOU TO NOD OFF OR FALL ASLEEP WHILE WATCHING TV: SLIGHT CHANCE OF DOZING
HOW LIKELY ARE YOU TO NOD OFF OR FALL ASLEEP WHILE SITTING AND READING: SLIGHT CHANCE OF DOZING
HOW LIKELY ARE YOU TO NOD OFF OR FALL ASLEEP WHILE SITTING QUIETLY AFTER LUNCH WITHOUT ALCOHOL: SLIGHT CHANCE OF DOZING
HOW LIKELY ARE YOU TO NOD OFF OR FALL ASLEEP WHEN YOU ARE A PASSENGER IN A CAR FOR AN HOUR WITHOUT A BREAK: MODERATE CHANCE OF DOZING
HOW LIKELY ARE YOU TO NOD OFF OR FALL ASLEEP WHILE SITTING INACTIVE IN A PUBLIC PLACE: WOULD NEVER DOZE
HOW LIKELY ARE YOU TO NOD OFF OR FALL ASLEEP WHILE SITTING AND TALKING TO SOMEONE: WOULD NEVER DOZE
HOW LIKELY ARE YOU TO NOD OFF OR FALL ASLEEP WHILE SITTING AND TALKING TO SOMEONE: WOULD NEVER DOZE
HOW LIKELY ARE YOU TO NOD OFF OR FALL ASLEEP WHILE WATCHING TV: SLIGHT CHANCE OF DOZING
HOW LIKELY ARE YOU TO NOD OFF OR FALL ASLEEP WHILE LYING DOWN TO REST IN THE AFTERNOON WHEN CIRCUMSTANCES PERMIT: HIGH CHANCE OF DOZING
HOW LIKELY ARE YOU TO NOD OFF OR FALL ASLEEP WHILE SITTING AND READING: SLIGHT CHANCE OF DOZING
HOW LIKELY ARE YOU TO NOD OFF OR FALL ASLEEP IN A CAR, WHILE STOPPED FOR A FEW MINUTES IN TRAFFIC: WOULD NEVER DOZE
HOW LIKELY ARE YOU TO NOD OFF OR FALL ASLEEP WHILE SITTING QUIETLY AFTER LUNCH WITHOUT ALCOHOL: SLIGHT CHANCE OF DOZING
ESS TOTAL SCORE: 8
HOW LIKELY ARE YOU TO NOD OFF OR FALL ASLEEP WHILE SITTING INACTIVE IN A PUBLIC PLACE: WOULD NEVER DOZE
HOW LIKELY ARE YOU TO NOD OFF OR FALL ASLEEP WHEN YOU ARE A PASSENGER IN A CAR FOR AN HOUR WITHOUT A BREAK: MODERATE CHANCE OF DOZING

## 2025-01-23 ENCOUNTER — OFFICE VISIT (OUTPATIENT)
Dept: PULMONOLOGY | Age: 60
End: 2025-01-23
Payer: COMMERCIAL

## 2025-01-23 VITALS
DIASTOLIC BLOOD PRESSURE: 78 MMHG | HEIGHT: 65 IN | WEIGHT: 263 LBS | HEART RATE: 96 BPM | OXYGEN SATURATION: 96 % | BODY MASS INDEX: 43.82 KG/M2 | SYSTOLIC BLOOD PRESSURE: 124 MMHG

## 2025-01-23 DIAGNOSIS — I10 ESSENTIAL HYPERTENSION: ICD-10-CM

## 2025-01-23 DIAGNOSIS — G47.33 OSA (OBSTRUCTIVE SLEEP APNEA): Primary | ICD-10-CM

## 2025-01-23 DIAGNOSIS — E66.813 CLASS 3 SEVERE OBESITY DUE TO EXCESS CALORIES WITH SERIOUS COMORBIDITY AND BODY MASS INDEX (BMI) OF 40.0 TO 44.9 IN ADULT: ICD-10-CM

## 2025-01-23 DIAGNOSIS — E66.01 CLASS 3 SEVERE OBESITY DUE TO EXCESS CALORIES WITH SERIOUS COMORBIDITY AND BODY MASS INDEX (BMI) OF 40.0 TO 44.9 IN ADULT: ICD-10-CM

## 2025-01-23 PROCEDURE — 99204 OFFICE O/P NEW MOD 45 MIN: CPT | Performed by: INTERNAL MEDICINE

## 2025-01-23 PROCEDURE — 3074F SYST BP LT 130 MM HG: CPT | Performed by: INTERNAL MEDICINE

## 2025-01-23 PROCEDURE — 3078F DIAST BP <80 MM HG: CPT | Performed by: INTERNAL MEDICINE

## 2025-01-23 NOTE — PROGRESS NOTES
Without murmer  RESPIRATORY & CHEST: Lungs are clear to auscultation and percussion. No wheezing, no crackles. Good air movement  GASTROINTESTINAL & ABDOMEN: Soft, nontender, positive bowel sounds in all quadrants, non-distended, without hepatosplenomegaly.   GENITOURINARY: Deferred.   MUSCULOSKELETAL: No tenderness to palpation of the axial skeleton. There is no clubbing. No cyanosis. No edema of the lower extremities.   SKIN OF BODY: No rash or jaundice.   PSYCHIATRIC EVALUATION: Normal affect. Patient answers questions appropriately.   HEMATOLOGIC/LYMPHATIC/ IMMUNOLOGIC: No palpable lymphadenopathy.  NEUROLOGIC: Alert and oriented x 3.Groslly non-focal. Motor strength is 5+/5 in all muscle groups. The patient has a normal sensorium globally.      LABS:        Latest Ref Rng & Units 12/30/2024     8:49 AM 11/2/2024     4:43 AM 11/1/2024     7:00 PM   Lab Summary   WBC 4.0 - 11.0 K/uL 6.5  6.2  6.9    Hgb 12.0 - 16.0 g/dL 14.9  14.0  15.4    Hct 36.0 - 48.0 % 44.1  41.8  44.9    Platelets 135 - 450 K/uL 286  259  312    Sodium 136 - 145 mmol/L 143  139  143    Potassium 3.5 - 5.1 mmol/L 4.2  3.9  4.0    BUN 7 - 20 mg/dL 14  22  16    Creatinine 0.6 - 1.2 mg/dL 0.7  0.9  0.8    Glucose 70 - 99 mg/dL 127  114  105    Calcium 8.3 - 10.6 mg/dL 9.4  9.6  10.2    Alk Phos 40 - 129 U/L 79   89    Albumin 3.4 - 5.0 g/dL 3.9   4.6    Total Protein 6.4 - 8.2 g/dL 6.2   7.4    Bilirubin 0.0 - 1.0 mg/dL 0.3   <0.2    AST 15 - 37 U/L 22   21    ALT 10 - 40 U/L 17   14    Cholesterol 0 - 199 mg/dL  216     HDL cholesterol 40 - 60 mg/dL  54     Triglycerides 0 - 150 mg/dL  245     LDL calc <100 mg/dL  113     VLDL calc Not Established mg/dL  49         All labs were personally reviewed by me any my interpretation is: CBC is within normal limits. Chem 8 is hyperglycemia.     IMAGING:    TWO XRAY VIEWS OF THE CHEST 11/1/2024 6:58 pm     IMPRESSION:  No acute process.       Pulmonary Functions Testing Results:      Baseline

## 2025-01-24 ENCOUNTER — TELEPHONE (OUTPATIENT)
Dept: SLEEP CENTER | Age: 60
End: 2025-01-24

## 2025-01-29 ENCOUNTER — TELEPHONE (OUTPATIENT)
Dept: CARDIOLOGY CLINIC | Age: 60
End: 2025-01-29

## 2025-01-29 ENCOUNTER — OFFICE VISIT (OUTPATIENT)
Age: 60
End: 2025-01-29
Payer: COMMERCIAL

## 2025-01-29 VITALS
HEART RATE: 105 BPM | BODY MASS INDEX: 42.82 KG/M2 | SYSTOLIC BLOOD PRESSURE: 112 MMHG | OXYGEN SATURATION: 98 % | HEIGHT: 65 IN | DIASTOLIC BLOOD PRESSURE: 82 MMHG | WEIGHT: 257 LBS

## 2025-01-29 DIAGNOSIS — G47.33 OBSTRUCTIVE SLEEP APNEA SYNDROME: Primary | ICD-10-CM

## 2025-01-29 DIAGNOSIS — I10 ESSENTIAL HYPERTENSION: ICD-10-CM

## 2025-01-29 DIAGNOSIS — I20.1 PRINZMETAL ANGINA (HCC): ICD-10-CM

## 2025-01-29 DIAGNOSIS — I51.89 DIASTOLIC DYSFUNCTION: ICD-10-CM

## 2025-01-29 DIAGNOSIS — I44.30 AV HEART BLOCK: ICD-10-CM

## 2025-01-29 PROCEDURE — 99205 OFFICE O/P NEW HI 60 MIN: CPT | Performed by: INTERNAL MEDICINE

## 2025-01-29 PROCEDURE — 3079F DIAST BP 80-89 MM HG: CPT | Performed by: INTERNAL MEDICINE

## 2025-01-29 PROCEDURE — 93000 ELECTROCARDIOGRAM COMPLETE: CPT | Performed by: INTERNAL MEDICINE

## 2025-01-29 PROCEDURE — 3074F SYST BP LT 130 MM HG: CPT | Performed by: INTERNAL MEDICINE

## 2025-01-29 RX ORDER — LUBIPROSTONE 24 UG/1
24 CAPSULE ORAL 2 TIMES DAILY WITH MEALS
COMMUNITY

## 2025-01-29 RX ORDER — POLYETHYLENE GLYCOL 3350 17 G/17G
17 POWDER, FOR SOLUTION ORAL DAILY PRN
COMMUNITY

## 2025-01-29 RX ORDER — VIBEGRON 75 MG/1
75 TABLET, FILM COATED ORAL DAILY
COMMUNITY

## 2025-01-29 NOTE — TELEPHONE ENCOUNTER
Pre procedure instructions are located in the after visit summary. We discussed these instructions in detail prior to patient leaving appointment.     Procedure : dual chamber ppm

## 2025-01-29 NOTE — PATIENT INSTRUCTIONS
You are scheduled for a Pacemaker    Our  will call you to discuss a date for you procedure.    The Cath Lab will call you a week before your procedure.    Lab work is required, please ensure this is completed within the 30 days leading up to your procedure.    PRE-PROCEDURE  INSTRUCTIONS   -One week before please start washing with an antibacterial soap, when showering. The night before your procedure you will need to scrub with Hibiclens wash.   -You will need to fast for at least 8 hours prior to your procedure.   -The day of your procedure you will need to check in at the registration desk, which is in the main lobby at [facility].  -Stop Jardiance® (empagliflozin) the day before your procedure and the morning of your procedure.   -You may hold your diuretic (water pill) the morning of your procedure.   -You may take all other medications with a sip of water the morning of your procedure.   -Please have a responsible adult to drive you home upon discharge.   -The discharging unit will be giving you discharge instructions.    If you have any questions regarding your procedure itself or your medications, please call [phone] and ask to talk to an EP nurse.    You will be seen in the office in 1 week for a wound check and then 3 months following implantation.

## 2025-02-04 ENCOUNTER — TELEPHONE (OUTPATIENT)
Dept: CARDIOLOGY CLINIC | Age: 60
End: 2025-02-04

## 2025-02-04 NOTE — TELEPHONE ENCOUNTER
This patient is requesting a call from [facility], to schedule her pacemaker, [phone].

## 2025-02-06 ENCOUNTER — TELEPHONE (OUTPATIENT)
Dept: CARDIOLOGY CLINIC | Age: 60
End: 2025-02-06

## 2025-02-06 PROBLEM — I44.30 AV BLOCK: Status: ACTIVE | Noted: 2025-01-29

## 2025-02-06 NOTE — TELEPHONE ENCOUNTER
Called and spoke with patient. She is still feeling poorly and wants to get scheduled asap due to her syncope and dizziness. Told her I would forward as high priority to Thelma

## 2025-02-06 NOTE — TELEPHONE ENCOUNTER
Procedure: DUAL PPM IMPLANT    Date Of Procedure:  02/11/25    Time Of Arrival: 1230PM    Procedure Time: 130PM       Called and spoke to patient and she is agreeable to the date and time. Reviewed the Pre-Procedure instructions and they verbalized understanding. Encouraged to call with any questions or concerns.       Published on Hymite / emailed to Cath lab / note in chart / scheduled in Epic/Cupid/Carto

## 2025-02-06 NOTE — TELEPHONE ENCOUNTER
Pt called a little uneasy and wants to speak w/EP nurse re: when will she be scheduled for her procedure.  I sent Danette an encounter to call the Pt but the Pt requested for me to contact the EP nurse to have them call her.  DIANNA

## 2025-02-06 NOTE — TELEPHONE ENCOUNTER
Alex DESIR, with [employer], [phone], on behalf of patient who is concerned that her pacemaker procedure has not been scheduled.  Assured him that messages are in, and  patient will be contacted, please call patient to schedule [phone]

## 2025-02-06 NOTE — TELEPHONE ENCOUNTER
Pt called a little uneasy and wants to speak w/EP nurse to check on why she had not been scheduled for her procedure.  Please call the Pt to ease her concerns re: scheduling.  Thank you

## 2025-02-07 ENCOUNTER — HOSPITAL ENCOUNTER (OUTPATIENT)
Age: 60
Discharge: HOME | End: 2025-02-07
Payer: COMMERCIAL

## 2025-02-07 DIAGNOSIS — I44.30 AV HEART BLOCK: ICD-10-CM

## 2025-02-07 LAB
ALBUMIN SERPL-MCNC: 4.4 G/DL (ref 3.4–5)
ALBUMIN/GLOB SERPL: 1.6 {RATIO} (ref 1.1–2.2)
ALP SERPL-CCNC: 83 U/L (ref 40–129)
ALT SERPL-CCNC: 18 U/L (ref 10–40)
ANION GAP SERPL CALCULATED.3IONS-SCNC: 12 MMOL/L (ref 3–16)
AST SERPL-CCNC: 23 U/L (ref 15–37)
BASOPHILS # BLD: 0.1 K/UL (ref 0–0.2)
BASOPHILS NFR BLD: 1.3 %
BILIRUB SERPL-MCNC: 0.3 MG/DL (ref 0–1)
BUN SERPL-MCNC: 16 MG/DL (ref 7–20)
CALCIUM SERPL-MCNC: 10.2 MG/DL (ref 8.3–10.6)
CHLORIDE SERPL-SCNC: 100 MMOL/L (ref 99–110)
CO2 SERPL-SCNC: 29 MMOL/L (ref 21–32)
CREAT SERPL-MCNC: 0.7 MG/DL (ref 0.6–1.2)
DEPRECATED RDW RBC AUTO: 13.7 % (ref 12.4–15.4)
EOSINOPHIL # BLD: 0.1 K/UL (ref 0–0.6)
EOSINOPHIL NFR BLD: 1.6 %
GFR SERPLBLD CREATININE-BSD FMLA CKD-EPI: >90 ML/MIN/{1.73_M2}
GLUCOSE SERPL-MCNC: 115 MG/DL (ref 70–99)
HCT VFR BLD AUTO: 47 % (ref 36–48)
HGB BLD-MCNC: 15.5 G/DL (ref 12–16)
LYMPHOCYTES # BLD: 1.3 K/UL (ref 1–5.1)
LYMPHOCYTES NFR BLD: 18 %
MCH RBC QN AUTO: 29.8 PG (ref 26–34)
MCHC RBC AUTO-ENTMCNC: 33 G/DL (ref 31–36)
MCV RBC AUTO: 90.4 FL (ref 80–100)
MONOCYTES # BLD: 0.5 K/UL (ref 0–1.3)
MONOCYTES NFR BLD: 7.1 %
NEUTROPHILS # BLD: 5.4 K/UL (ref 1.7–7.7)
NEUTROPHILS NFR BLD: 72 %
PLATELET # BLD AUTO: 292 K/UL (ref 135–450)
PMV BLD AUTO: 10.5 FL (ref 5–10.5)
POTASSIUM SERPL-SCNC: 3.7 MMOL/L (ref 3.5–5.1)
PROT SERPL-MCNC: 7.1 G/DL (ref 6.4–8.2)
RBC # BLD AUTO: 5.2 M/UL (ref 4–5.2)
SODIUM SERPL-SCNC: 141 MMOL/L (ref 136–145)
WBC # BLD AUTO: 7.5 K/UL (ref 4–11)

## 2025-02-07 PROCEDURE — 85025 COMPLETE CBC W/AUTO DIFF WBC: CPT

## 2025-02-07 PROCEDURE — 80053 COMPREHEN METABOLIC PANEL: CPT

## 2025-02-07 PROCEDURE — 36415 COLL VENOUS BLD VENIPUNCTURE: CPT

## 2025-02-11 ENCOUNTER — HOSPITAL ENCOUNTER (OUTPATIENT)
Age: 60
Setting detail: OUTPATIENT SURGERY
Discharge: HOME | End: 2025-02-11
Attending: INTERNAL MEDICINE | Admitting: INTERNAL MEDICINE
Payer: COMMERCIAL

## 2025-02-11 ENCOUNTER — APPOINTMENT (OUTPATIENT)
Dept: GENERAL RADIOLOGY | Age: 60
End: 2025-02-11
Attending: INTERNAL MEDICINE
Payer: COMMERCIAL

## 2025-02-11 VITALS
HEART RATE: 97 BPM | DIASTOLIC BLOOD PRESSURE: 81 MMHG | OXYGEN SATURATION: 94 % | RESPIRATION RATE: 16 BRPM | BODY MASS INDEX: 42.82 KG/M2 | WEIGHT: 257 LBS | SYSTOLIC BLOOD PRESSURE: 137 MMHG | TEMPERATURE: 98.5 F | HEIGHT: 65 IN

## 2025-02-11 DIAGNOSIS — I44.1 MOBITZ TYPE II ATRIOVENTRICULAR BLOCK: Primary | ICD-10-CM

## 2025-02-11 DIAGNOSIS — I44.30 AV BLOCK: ICD-10-CM

## 2025-02-11 LAB — ECHO BSA: 2.31 M2

## 2025-02-11 PROCEDURE — 6360000002 HC RX W HCPCS: Performed by: INTERNAL MEDICINE

## 2025-02-11 PROCEDURE — 7100000011 HC PHASE II RECOVERY - ADDTL 15 MIN: Performed by: INTERNAL MEDICINE

## 2025-02-11 PROCEDURE — C1898 LEAD, PMKR, OTHER THAN TRANS: HCPCS | Performed by: INTERNAL MEDICINE

## 2025-02-11 PROCEDURE — C1894 INTRO/SHEATH, NON-LASER: HCPCS | Performed by: INTERNAL MEDICINE

## 2025-02-11 PROCEDURE — C1785 PMKR, DUAL, RATE-RESP: HCPCS | Performed by: INTERNAL MEDICINE

## 2025-02-11 PROCEDURE — 6360000004 HC RX CONTRAST MEDICATION: Performed by: INTERNAL MEDICINE

## 2025-02-11 PROCEDURE — C1889 IMPLANT/INSERT DEVICE, NOC: HCPCS | Performed by: INTERNAL MEDICINE

## 2025-02-11 PROCEDURE — 6370000000 HC RX 637 (ALT 250 FOR IP): Performed by: INTERNAL MEDICINE

## 2025-02-11 PROCEDURE — 33208 INSRT HEART PM ATRIAL & VENT: CPT | Performed by: INTERNAL MEDICINE

## 2025-02-11 PROCEDURE — C1769 GUIDE WIRE: HCPCS | Performed by: INTERNAL MEDICINE

## 2025-02-11 PROCEDURE — 2500000003 HC RX 250 WO HCPCS: Performed by: INTERNAL MEDICINE

## 2025-02-11 PROCEDURE — 7100000010 HC PHASE II RECOVERY - FIRST 15 MIN: Performed by: INTERNAL MEDICINE

## 2025-02-11 PROCEDURE — 99153 MOD SED SAME PHYS/QHP EA: CPT | Performed by: INTERNAL MEDICINE

## 2025-02-11 PROCEDURE — C1892 INTRO/SHEATH,FIXED,PEEL-AWAY: HCPCS | Performed by: INTERNAL MEDICINE

## 2025-02-11 PROCEDURE — 71045 X-RAY EXAM CHEST 1 VIEW: CPT

## 2025-02-11 PROCEDURE — 99152 MOD SED SAME PHYS/QHP 5/>YRS: CPT | Performed by: INTERNAL MEDICINE

## 2025-02-11 PROCEDURE — 2709999900 HC NON-CHARGEABLE SUPPLY: Performed by: INTERNAL MEDICINE

## 2025-02-11 PROCEDURE — 2580000003 HC RX 258: Performed by: INTERNAL MEDICINE

## 2025-02-11 DEVICE — ENVELOPE PACEMKR L W2.9XL3.3IN ABSRB ANTIBACT TYRX: Type: IMPLANTABLE DEVICE | Status: FUNCTIONAL

## 2025-02-11 DEVICE — IPG W1DR01 AZURE XT DR MRI USA
Type: IMPLANTABLE DEVICE | Status: FUNCTIONAL
Brand: AZURE™ XT DR MRI SURESCAN™

## 2025-02-11 DEVICE — LEAD 3830 US MKT/ 69CM MRI LBBAP
Type: IMPLANTABLE DEVICE | Status: FUNCTIONAL
Brand: SELECTSECURE™ MRI SURESCAN™

## 2025-02-11 DEVICE — LEAD 5076-52 MRI US RCMCRD
Type: IMPLANTABLE DEVICE | Status: FUNCTIONAL
Brand: CAPSUREFIX NOVUS MRI™ SURESCAN®

## 2025-02-11 RX ORDER — ACETAMINOPHEN 325 MG/1
650 TABLET ORAL ONCE
Status: COMPLETED | OUTPATIENT
Start: 2025-02-11 | End: 2025-02-11

## 2025-02-11 RX ORDER — FENTANYL CITRATE 50 UG/ML
INJECTION, SOLUTION INTRAMUSCULAR; INTRAVENOUS PRN
Status: DISCONTINUED | OUTPATIENT
Start: 2025-02-11 | End: 2025-02-11 | Stop reason: HOSPADM

## 2025-02-11 RX ORDER — 0.9 % SODIUM CHLORIDE 0.9 %
INTRAVENOUS SOLUTION INTRAVENOUS CONTINUOUS PRN
Status: COMPLETED | OUTPATIENT
Start: 2025-02-11 | End: 2025-02-11

## 2025-02-11 RX ORDER — IOPAMIDOL 755 MG/ML
INJECTION, SOLUTION INTRAVASCULAR PRN
Status: DISCONTINUED | OUTPATIENT
Start: 2025-02-11 | End: 2025-02-11 | Stop reason: HOSPADM

## 2025-02-11 RX ORDER — MIDAZOLAM HYDROCHLORIDE 1 MG/ML
INJECTION, SOLUTION INTRAMUSCULAR; INTRAVENOUS PRN
Status: DISCONTINUED | OUTPATIENT
Start: 2025-02-11 | End: 2025-02-11 | Stop reason: HOSPADM

## 2025-02-11 RX ORDER — DIPHENHYDRAMINE HYDROCHLORIDE 50 MG/ML
INJECTION INTRAMUSCULAR; INTRAVENOUS PRN
Status: DISCONTINUED | OUTPATIENT
Start: 2025-02-11 | End: 2025-02-11 | Stop reason: HOSPADM

## 2025-02-11 RX ORDER — CEFAZOLIN SODIUM 1 G/3ML
INJECTION, POWDER, FOR SOLUTION INTRAMUSCULAR; INTRAVENOUS PRN
Status: DISCONTINUED | OUTPATIENT
Start: 2025-02-11 | End: 2025-02-11 | Stop reason: HOSPADM

## 2025-02-11 RX ORDER — BUPIVACAINE HYDROCHLORIDE 5 MG/ML
INJECTION, SOLUTION EPIDURAL; INTRACAUDAL PRN
Status: DISCONTINUED | OUTPATIENT
Start: 2025-02-11 | End: 2025-02-11 | Stop reason: HOSPADM

## 2025-02-11 RX ADMIN — ACETAMINOPHEN 650 MG: 325 TABLET ORAL at 17:02

## 2025-02-11 RX ADMIN — METHYLPREDNISOLONE SODIUM SUCCINATE 125 MG: 40 INJECTION INTRAMUSCULAR; INTRAVENOUS at 15:14

## 2025-02-11 ASSESSMENT — PAIN SCALES - GENERAL: PAINLEVEL_OUTOF10: 3

## 2025-02-11 NOTE — PROCEDURES
[facility]     Electrophysiology Procedure Note       Date of Procedure: 2/11/2025  Patient's Name: Maddi Sanford  YOB: 1965   Medical Record Number: [id number]  Referring Physician: Lito Robles MD  Procedure Performed by: Lito Robles MD    Procedures performed:     Insertion of MRI compatible right ventricular pacing lead under fluoroscopy.  Insertion of MRI compatible right atrial lead under fluoroscopy  Insertion of a MRI compatible  dual chamber Pacemaker  IV sedation.  Sedation start time: 14:47 PM  Sedation stop time: 16:17 PM  Fentanyl: 550 Mcg  Versed: 10 Mg  An independent trained observer assisted in the monitoring of the patient's level of consciousness and physiological status including vital signs.    Indication of the procedure: Non-reversible symptomatic bradycardia, Mobitz type II AV block, recurrent syncope        Details of procedure:   The patient was brought to the electrophysiology laboratory in stable condition. The patient was in a fasting and non-sedated state. The risks, benefits and alternatives of the procedure were discussed with the patient. The risks including, but not limited to, the risks of vascular injury, bleeding, infection, device malfunction, lead dislodgement, radiation exposure, injury to cardiac and surrounding structures (including pneumothorax), stroke, myocardial infarction and death were discussed in detail. Patient opted to proceed with the device implantation. Written informed consent was signed and placed in the chart.  Prophylactic antibiotic was given.       The patient was prepped and draped in a sterile fashion. A timeout protocol was completed to identify the patient and the procedure being performed.     IV sedation was provided with IV Versed, Fentanyl.     Accessing venous access was difficult due to anatomy and body habitus and required multiple trial using US, Fluoro and venography. The innominate vein has tortuous course.  After

## 2025-02-11 NOTE — H&P
(HCC) ?    Migraines     Palpitations     PONV (postoperative nausea and vomiting)     Prinzmetal angina (HCC)     Sleep apnea     cpap, does not use    Syncope     Thyroid nodule     TIA (transient ischemic attack)     pt states 30 yrs ago    Vertigo     Wears hearing aid     bilateral hearing aids     Past Surgical History:   Procedure Laterality Date    ABDOMEN SURGERY      ACHILLES TENDON SURGERY Right 11/20/2023    RIGHT INSERTIONAL ACHILLES TENDONITIS WITH CALCANEUS PARTIAL EXCISION WITH SECONDARY REPAIR OF ACHILLES TENDON performed by Ranjana Fuller MD at [facility] OR    ACHILLES TENDON SURGERY Right 2/22/2024    ACHILLES DEBRIDEMENT, FLEXOR HALLUCIS LONGUS TENDON TRANSFER performed by Pedro Holliday MD at [facility] OR    ANKLE SURGERY Right 2/22/2024    RIGHT HEEL SUTURE ANCHOR REMOVAL, performed by Pedro Holliday MD at [facility] OR    BREAST BIOPSY      CARPAL TUNNEL RELEASE Bilateral     CERVIX REMOVAL  12/08/2016    CHOLECYSTECTOMY  2008    COLONOSCOPY      DEBRIDEMENT TENNIS ELBOW Bilateral     ELBOW SURGERY Bilateral     tennis elbow    ENDOMETRIAL ABLATION  1999    ENDOSCOPY, COLON, DIAGNOSTIC      ESOPHAGOGASTRODUODENOSCOPY      FOOT SURGERY Left 2002    2 screws , anchor    HYSTERECTOMY (CERVIX STATUS UNKNOWN)  2009    laparoscopic supracervical hyst for fibroids    JOINT REPLACEMENT Right     knee    LAP BAND  2008    removal 2011    MOUTH SURGERY N/A 11/10/2023    EXCISION OF SOFT PALATE LESION WITH CLOSURE performed by Tanmay Feliz DO at [facility] OR    OTHER SURGICAL HISTORY Bilateral     Menier's surgery    SHOULDER ARTHROSCOPY Right 11/18/2022    RIGHT SHOULDER ARTHROSCOPY; SUBACROMIAL DECOMPRESSION, ROTATOR CUFF TENDON REPAIR - BLOCK, EXCISION OF RIGHT UPPER ARM/SHOULDER performed by Bairon Peters MD at [facility] OR    TONSILLECTOMY  1971    TOTAL KNEE ARTHROPLASTY Right 2019    TUBAL LIGATION  2003     Allergies   Allergen Reactions    Iv Dye [Iodides] Hives and Nausea And Vomiting     vomiting    Relpax

## 2025-02-12 PROBLEM — Z95.0 S/P PLACEMENT OF CARDIAC PACEMAKER: Status: ACTIVE | Noted: 2025-02-12

## 2025-02-12 LAB
EKG ATRIAL RATE: 101 BPM
EKG DIAGNOSIS: NORMAL
EKG P AXIS: 48 DEGREES
EKG P-R INTERVAL: 180 MS
EKG Q-T INTERVAL: 332 MS
EKG QRS DURATION: 70 MS
EKG QTC CALCULATION (BAZETT): 430 MS
EKG R AXIS: -14 DEGREES
EKG T AXIS: -11 DEGREES
EKG VENTRICULAR RATE: 101 BPM

## 2025-02-12 NOTE — PROGRESS NOTES
(VALIUM) 10 MG tablet Take 1 tablet by mouth 2 times daily as needed. 5/13/24  Yes Jayla Kingston MD   promethazine (PHENERGAN) 25 MG tablet Take 1 tablet by mouth every 6 hours as needed for Nausea 11/20/23  Yes Ranjana Fuller MD   amLODIPine (NORVASC) 5 MG tablet Take 1 tablet by mouth daily 10/16/23  Yes Dave Bhatia MD   nitroGLYCERIN (NITROSTAT) 0.4 MG SL tablet Place 1 tablet under the tongue every 5 minutes as needed for Chest pain No more than 3 tabs in a 15 minute period. 5/10/23  Yes Dave Bhatia MD   senna (SENOKOT) 8.6 MG tablet Take 2 tablets by mouth daily At night. 7/18/22  Yes Jayla Kingston MD   triamterene-hydrochlorothiazide (MAXZIDE-25) 37.5-25 MG per tablet Take 1 tablet by mouth daily   Yes Jayla Kingston MD   sertraline (ZOLOFT) 100 MG tablet Take 1 tablet by mouth every morning   Yes Jayla Kingston MD   meclizine (ANTIVERT) 25 MG tablet Take 1 tablet by mouth 2 times daily as needed for Dizziness   Yes Jayla Kingston MD   omeprazole (PRILOSEC) 20 MG capsule Take 1 capsule by mouth 2 times daily   Yes Jayla Kingston MD   Probiotic Product (PRO-BIOTIC BLEND PO) Take 1 tablet by mouth Daily    Jayla Kingston MD   CRANBERRY PO Take by mouth 30,000, 2 tablets daily    Jayla Kingston MD     PHYSICAL EXAM        Vitals:    02/13/25 1220   BP: 118/82   Pulse: 91   SpO2: 93%      Weight - Scale: 117.9 kg (260 lb)     Gen Alert, cooperative, no distress. Obese  Body mass index is 43.27 kg/m².   Heart  Regular rate and rhythm, no murmur.  Normal S1 and S2.  Warm/well perfused.  No JVD.     HEENT Normocephalic, atraumatic.  Conj/corn clear  Lips, mucosa, tongue normal Abd  Soft, nontender, nondistended, no organomegaly   Neck Supple, trachea midline Ext  Ext nl, AT, no C/C, no edema   Lungs Lungs clear to auscultation bilaterally, unlabored breathing Pulse 2+ and symmetric   Chest wall No deformity Skin Color/text/turg nl, no

## 2025-02-13 ENCOUNTER — HOSPITAL ENCOUNTER (OUTPATIENT)
Age: 60
Discharge: HOME | End: 2025-02-13
Payer: COMMERCIAL

## 2025-02-13 ENCOUNTER — TELEPHONE (OUTPATIENT)
Dept: CARDIOLOGY CLINIC | Age: 60
End: 2025-02-13

## 2025-02-13 ENCOUNTER — OFFICE VISIT (OUTPATIENT)
Dept: CARDIOLOGY CLINIC | Age: 60
End: 2025-02-13
Payer: COMMERCIAL

## 2025-02-13 ENCOUNTER — NURSE ONLY (OUTPATIENT)
Dept: CARDIOLOGY CLINIC | Age: 60
End: 2025-02-13

## 2025-02-13 ENCOUNTER — HOSPITAL ENCOUNTER (OUTPATIENT)
Dept: GENERAL RADIOLOGY | Age: 60
Discharge: HOME | End: 2025-02-13
Payer: COMMERCIAL

## 2025-02-13 ENCOUNTER — HOSPITAL ENCOUNTER (OUTPATIENT)
Age: 60
Discharge: HOME | End: 2025-02-15
Attending: INTERNAL MEDICINE
Payer: COMMERCIAL

## 2025-02-13 VITALS
DIASTOLIC BLOOD PRESSURE: 82 MMHG | SYSTOLIC BLOOD PRESSURE: 118 MMHG | BODY MASS INDEX: 43.32 KG/M2 | WEIGHT: 260 LBS | OXYGEN SATURATION: 93 % | HEIGHT: 65 IN | HEART RATE: 91 BPM

## 2025-02-13 VITALS
WEIGHT: 257 LBS | HEIGHT: 65 IN | DIASTOLIC BLOOD PRESSURE: 72 MMHG | SYSTOLIC BLOOD PRESSURE: 132 MMHG | BODY MASS INDEX: 42.82 KG/M2

## 2025-02-13 DIAGNOSIS — I51.89 DIASTOLIC DYSFUNCTION: ICD-10-CM

## 2025-02-13 DIAGNOSIS — I20.1 PRINZMETAL ANGINA (HCC): ICD-10-CM

## 2025-02-13 DIAGNOSIS — Z95.0 S/P PLACEMENT OF CARDIAC PACEMAKER: Primary | ICD-10-CM

## 2025-02-13 DIAGNOSIS — R06.02 SOB (SHORTNESS OF BREATH): ICD-10-CM

## 2025-02-13 DIAGNOSIS — Z95.0 S/P PLACEMENT OF CARDIAC PACEMAKER: ICD-10-CM

## 2025-02-13 DIAGNOSIS — E66.01 CLASS 3 SEVERE OBESITY WITH BODY MASS INDEX (BMI) OF 45.0 TO 49.9 IN ADULT, UNSPECIFIED OBESITY TYPE, UNSPECIFIED WHETHER SERIOUS COMORBIDITY PRESENT: ICD-10-CM

## 2025-02-13 DIAGNOSIS — E66.813 CLASS 3 SEVERE OBESITY WITH BODY MASS INDEX (BMI) OF 45.0 TO 49.9 IN ADULT, UNSPECIFIED OBESITY TYPE, UNSPECIFIED WHETHER SERIOUS COMORBIDITY PRESENT: ICD-10-CM

## 2025-02-13 DIAGNOSIS — R06.02 SOB (SHORTNESS OF BREATH): Primary | ICD-10-CM

## 2025-02-13 DIAGNOSIS — I44.30 AV HEART BLOCK: ICD-10-CM

## 2025-02-13 DIAGNOSIS — G47.33 OBSTRUCTIVE SLEEP APNEA SYNDROME: ICD-10-CM

## 2025-02-13 DIAGNOSIS — R06.02 SHORTNESS OF BREATH: ICD-10-CM

## 2025-02-13 DIAGNOSIS — I10 ESSENTIAL HYPERTENSION: ICD-10-CM

## 2025-02-13 DIAGNOSIS — J06.9 VIRAL URI: ICD-10-CM

## 2025-02-13 LAB
ECHO BSA: 2.31 M2
ECHO LV EF PHYSICIAN: 60 %

## 2025-02-13 PROCEDURE — 3074F SYST BP LT 130 MM HG: CPT | Performed by: INTERNAL MEDICINE

## 2025-02-13 PROCEDURE — 99214 OFFICE O/P EST MOD 30 MIN: CPT | Performed by: INTERNAL MEDICINE

## 2025-02-13 PROCEDURE — 3079F DIAST BP 80-89 MM HG: CPT | Performed by: INTERNAL MEDICINE

## 2025-02-13 PROCEDURE — 71046 X-RAY EXAM CHEST 2 VIEWS: CPT

## 2025-02-13 PROCEDURE — G2211 COMPLEX E/M VISIT ADD ON: HCPCS | Performed by: INTERNAL MEDICINE

## 2025-02-13 PROCEDURE — 93308 TTE F-UP OR LMTD: CPT

## 2025-02-13 NOTE — PATIENT INSTRUCTIONS
Follow up with Dr Bhatia in 6 months     I sent your next dose of Semaglutide 1.7mg weekly to [facility]     Call for any questions or concerns.

## 2025-02-13 NOTE — TELEPHONE ENCOUNTER
Can someone please call and see if Patient is having any other symptoms. SOB, CP, back pain, trouble getting a deep breath, etc.    Thanks

## 2025-02-13 NOTE — PROGRESS NOTES
Patients incision is healing nicely. Outside dressing removed today. Wound clean, dry and intact. Patient educated on wound care. All questions answered.  Patient to call the office immediately with any signs on infection.     Home Monitor ordered and shipped. Instructions given.

## 2025-02-13 NOTE — TELEPHONE ENCOUNTER
He has an Appt with ELINA and can be evaluated then. If she cannot wait till 1030 then recommend ED evaluation

## 2025-02-13 NOTE — TELEPHONE ENCOUNTER
Called and spoke with. She stated she started to hear a whistling noise when she got home on 2/11/205 after her DCPPM Implant. Initially thought it was her dog but then realized it was her every time she took a breath. She does have some SOB but it is hard for her to take a full break. No CP a little back shoulder pain but just the day she got home from the hospital.  Please advise.  Does Patient need a Stat Echo or Chest X-ray?

## 2025-02-13 NOTE — TELEPHONE ENCOUNTER
Pt states she had a recent device placement. now occasionally when she breaths in she hears a high pitch whistle. Please call to advise.

## 2025-02-13 NOTE — TELEPHONE ENCOUNTER
Spoke with Margaret Guo, he wants a chest xray ordered and Patient to come to the office afterwards. Thanks

## 2025-02-13 NOTE — PATIENT INSTRUCTIONS
New Cardiac Device Implant (Pacemaker and/or Defibrillator) Post Op Instructions  Bathe with water indirectly hitting the incision site. Water and soap may run over the incision site. Do not scrub. Pat dry with a clean towel after bathing.   Leave incision open to the air; do not apply any dressings, ointments, or bandages to the area. Do not apply lotion, perfume/cologne, or powders to the area until it is completely healed.   Any scabbing or skin glue that is noted will fall off within 1-2 weeks after the post op appointment.   If any oozing, bleeding, or pus drainage occurs, please call the office immediately at [phone].        Patient has movement restrictions in place until 4 weeks post op (to the day of implant) unless otherwise instructed by physician.   Patient may not lift the device side arm above shoulder height.   Do not far reach or stretch across body or behind body with effected side.   Do not use this arm for pushing, pulling, or lifting body.   Do not use cane on the effected side.   Patient may not lift anything heavier than a gallon of milk with the associated arm.     Appointments to expect going forward:  Post operatively the patient will have had a 1-week post op check and a 3 month follow up with NP/MD and the device clinic.        Remote Monitoring Instructions     Within 2-3 weeks of your device being implanted, you will receive a call from the  of your device. Please answer this call as it is to set up remote monitoring for your device. Once you receive your in-home monitor, please follow the instructions provided to sync the home monitor to your implanted device. Once you have paired your home monitor to your implanted device, keep your monitor plugged in within 6 feet of where you sleep. Your monitor will routinely check in with your device during sleep hours and transmit any urgent events to the Device Clinic for review.     Please do not send additional routine

## 2025-02-17 ENCOUNTER — NURSE ONLY (OUTPATIENT)
Dept: CARDIOLOGY CLINIC | Age: 60
End: 2025-02-17

## 2025-02-17 DIAGNOSIS — Z95.0 PACEMAKER: Primary | ICD-10-CM

## 2025-02-17 DIAGNOSIS — I44.30 AV BLOCK: ICD-10-CM

## 2025-02-17 DIAGNOSIS — I44.30 AV HEART BLOCK: ICD-10-CM

## 2025-02-24 ENCOUNTER — HOSPITAL ENCOUNTER (OUTPATIENT)
Dept: SLEEP CENTER | Age: 60
Discharge: HOME | End: 2025-02-24
Attending: INTERNAL MEDICINE
Payer: COMMERCIAL

## 2025-02-24 DIAGNOSIS — G47.33 OSA (OBSTRUCTIVE SLEEP APNEA): ICD-10-CM

## 2025-02-24 PROCEDURE — 95806 SLEEP STUDY UNATT&RESP EFFT: CPT

## 2025-02-27 PROBLEM — G47.33 OSA (OBSTRUCTIVE SLEEP APNEA): Status: ACTIVE | Noted: 2025-02-27

## 2025-03-04 ENCOUNTER — TELEPHONE (OUTPATIENT)
Dept: PULMONOLOGY | Age: 60
End: 2025-03-04

## 2025-03-12 ENCOUNTER — TELEPHONE (OUTPATIENT)
Dept: ADMINISTRATIVE | Age: 60
End: 2025-03-12

## 2025-03-12 NOTE — TELEPHONE ENCOUNTER
Pt called to ask if the office would re-send the to [facility] where her insurance will pay for it.  The office send it to the wrong Pharmacy [facility] it needs to go to [facility]      Medication Refill     Medication needing refilled:  Semaglutide      Dosage of the medication: 2.4 mg      How are you taking this medication (QD, BID, TID, QID, PRN):   Inject 2.4 mg into skin every 7 days.  Okay to add Vitamin B12     30 or 90 day supply called in: 1 refill     When will you run out of your medication:     Which Pharmacy are we sending the medication to?    [facility]  [address], [place]  [address]  Phone:   [phone]   Fax:   [phone]

## 2025-03-12 NOTE — TELEPHONE ENCOUNTER
Medication Refill    Medication needing refilled:  Semaglutide     Dosage of the medication: 2.4 mg     How are you taking this medication (QD, BID, TID, QID, PRN):    30 or 90 day supply called in:    When will you run out of your medication:    Which Pharmacy are we sending the medication to?: [facility] - [place] [address] - P [phone] - F [phone]

## 2025-03-12 NOTE — TELEPHONE ENCOUNTER
Submitted PA for Ozempic (0.25 or 0.5 MG/DOSE) 2MG/3ML pen-injectors  Via CMM Key: [id number] STATUS: PENDING.    Follow up done daily; if no decision with in three days we will refax.  If another three days goes by with no decision will call the insurance for status.

## 2025-03-13 NOTE — TELEPHONE ENCOUNTER
The medication was DENIED; DENIAL letter is uploaded to MEDIA.    Generic Denial:  Other; please see Denial Letter.     Note :  The patient does not have Type 2 DM.      If you want an APPEAL; please note in this encounter what new information you would like to APPEAL with.  Once complete route back to PA POOL.    If this requires a response please respond to the pool ( P [facility] MEDICATION PRE-AUTH).      Thank you please advise patient.

## 2025-03-13 NOTE — TELEPHONE ENCOUNTER
No PA needed. Pt will need to get her medication from [facility]. Please call her to advise. Thank you!

## 2025-03-20 DIAGNOSIS — R07.9 CHEST PAIN, UNSPECIFIED TYPE: ICD-10-CM

## 2025-03-20 RX ORDER — NITROGLYCERIN 0.4 MG/1
0.4 TABLET SUBLINGUAL EVERY 5 MIN PRN
Qty: 25 TABLET | Refills: 3 | Status: SHIPPED | OUTPATIENT
Start: 2025-03-20

## 2025-03-20 NOTE — TELEPHONE ENCOUNTER
Last ov:25 WAK  Next ov:25 NPRJ  Last EK25  Last labs:25  Last filled:   Disp Refills Start End    nitroGLYCERIN (NITROSTAT) 0.4 MG SL tablet 25 tablet 3 5/10/2023 --    Sig - Route: Place 1 tablet under the tongue every 5 minutes as needed for Chest pain No more than 3 tabs in a 15 minute period. - SubLINGual    Sent to pharmacy as: Nitroglycerin 0.4 MG Sublingual Tablet Sublingual (Nitrostat)    Cosign for Ordering: Accepted by Dave Bhatia MD on 5/10/2023  3:36 PM    E-Prescribing Status: Receipt confirmed by pharmacy (5/10/2023 10:22 AM EDT)

## 2025-03-24 DIAGNOSIS — R07.9 CHEST PAIN, UNSPECIFIED TYPE: ICD-10-CM

## 2025-03-24 RX ORDER — NITROGLYCERIN 0.4 MG/1
0.4 TABLET SUBLINGUAL EVERY 5 MIN PRN
Qty: 25 TABLET | Refills: 3 | OUTPATIENT
Start: 2025-03-24

## 2025-03-24 NOTE — TELEPHONE ENCOUNTER
Pt said a script was sent to [facility] on [address] [facility] on 3/20 and she has already picked it up.

## 2025-03-24 NOTE — TELEPHONE ENCOUNTER
LVM for patient to call the office back.  Need to verify if she wants the RX for Nitroglycerin sub 0.4mg sent to [facility].

## 2025-04-15 NOTE — TELEPHONE ENCOUNTER
Requested Prescriptions     Pending Prescriptions Disp Refills    rosuvastatin (CRESTOR) 20 MG tablet 90 tablet 1     Sig: Take 1 tablet by mouth daily      Last OV:  2/13/2025 WAK    Next OV: 5/12/2025 NPRJ    Last Labs: 11/2/2024 Lipid    Last Filled: 10/15/2024 WAK

## 2025-04-16 RX ORDER — ROSUVASTATIN CALCIUM 20 MG/1
20 TABLET, COATED ORAL NIGHTLY
Qty: 90 TABLET | Refills: 1 | Status: SHIPPED | OUTPATIENT
Start: 2025-04-16

## 2025-05-04 ASSESSMENT — SLEEP AND FATIGUE QUESTIONNAIRES
HOW LIKELY ARE YOU TO NOD OFF OR FALL ASLEEP WHILE SITTING QUIETLY AFTER LUNCH WITHOUT ALCOHOL: SLIGHT CHANCE OF DOZING
HOW LIKELY ARE YOU TO NOD OFF OR FALL ASLEEP IN A CAR, WHILE STOPPED FOR A FEW MINUTES IN TRAFFIC: WOULD NEVER DOZE
HOW LIKELY ARE YOU TO NOD OFF OR FALL ASLEEP WHILE SITTING AND READING: SLIGHT CHANCE OF DOZING
HOW LIKELY ARE YOU TO NOD OFF OR FALL ASLEEP WHILE LYING DOWN TO REST IN THE AFTERNOON WHEN CIRCUMSTANCES PERMIT: HIGH CHANCE OF DOZING
HOW LIKELY ARE YOU TO NOD OFF OR FALL ASLEEP WHILE SITTING INACTIVE IN A PUBLIC PLACE: SLIGHT CHANCE OF DOZING
HOW LIKELY ARE YOU TO NOD OFF OR FALL ASLEEP WHILE LYING DOWN TO REST IN THE AFTERNOON WHEN CIRCUMSTANCES PERMIT: HIGH CHANCE OF DOZING
HOW LIKELY ARE YOU TO NOD OFF OR FALL ASLEEP WHILE WATCHING TV: WOULD NEVER DOZE
ESS TOTAL SCORE: 8
HOW LIKELY ARE YOU TO NOD OFF OR FALL ASLEEP WHEN YOU ARE A PASSENGER IN A CAR FOR AN HOUR WITHOUT A BREAK: MODERATE CHANCE OF DOZING
HOW LIKELY ARE YOU TO NOD OFF OR FALL ASLEEP WHEN YOU ARE A PASSENGER IN A CAR FOR AN HOUR WITHOUT A BREAK: MODERATE CHANCE OF DOZING
HOW LIKELY ARE YOU TO NOD OFF OR FALL ASLEEP WHILE SITTING AND READING: SLIGHT CHANCE OF DOZING
HOW LIKELY ARE YOU TO NOD OFF OR FALL ASLEEP WHILE SITTING QUIETLY AFTER LUNCH WITHOUT ALCOHOL: SLIGHT CHANCE OF DOZING
HOW LIKELY ARE YOU TO NOD OFF OR FALL ASLEEP IN A CAR, WHILE STOPPED FOR A FEW MINUTES IN TRAFFIC: WOULD NEVER DOZE
HOW LIKELY ARE YOU TO NOD OFF OR FALL ASLEEP WHILE SITTING AND TALKING TO SOMEONE: WOULD NEVER DOZE
HOW LIKELY ARE YOU TO NOD OFF OR FALL ASLEEP WHILE WATCHING TV: WOULD NEVER DOZE
HOW LIKELY ARE YOU TO NOD OFF OR FALL ASLEEP WHILE SITTING INACTIVE IN A PUBLIC PLACE: SLIGHT CHANCE OF DOZING
HOW LIKELY ARE YOU TO NOD OFF OR FALL ASLEEP WHILE SITTING AND TALKING TO SOMEONE: WOULD NEVER DOZE

## 2025-05-07 ENCOUNTER — OFFICE VISIT (OUTPATIENT)
Dept: PULMONOLOGY | Age: 60
End: 2025-05-07
Payer: COMMERCIAL

## 2025-05-07 VITALS
BODY MASS INDEX: 41.99 KG/M2 | HEART RATE: 86 BPM | OXYGEN SATURATION: 96 % | SYSTOLIC BLOOD PRESSURE: 124 MMHG | WEIGHT: 252 LBS | DIASTOLIC BLOOD PRESSURE: 76 MMHG | HEIGHT: 65 IN

## 2025-05-07 DIAGNOSIS — G47.33 OSA (OBSTRUCTIVE SLEEP APNEA): Primary | ICD-10-CM

## 2025-05-07 DIAGNOSIS — I10 ESSENTIAL HYPERTENSION: ICD-10-CM

## 2025-05-07 DIAGNOSIS — E66.813 CLASS 3 SEVERE OBESITY DUE TO EXCESS CALORIES WITH SERIOUS COMORBIDITY AND BODY MASS INDEX (BMI) OF 40.0 TO 44.9 IN ADULT (HCC): ICD-10-CM

## 2025-05-07 PROCEDURE — 3074F SYST BP LT 130 MM HG: CPT | Performed by: INTERNAL MEDICINE

## 2025-05-07 PROCEDURE — G2211 COMPLEX E/M VISIT ADD ON: HCPCS | Performed by: INTERNAL MEDICINE

## 2025-05-07 PROCEDURE — 3078F DIAST BP <80 MM HG: CPT | Performed by: INTERNAL MEDICINE

## 2025-05-07 PROCEDURE — 99214 OFFICE O/P EST MOD 30 MIN: CPT | Performed by: INTERNAL MEDICINE

## 2025-05-07 NOTE — PROGRESS NOTES
PULMONARY OFFICE FOLLOW-UP VISIT    CONSULTING PHYSICIAN:  Alma Delia Izaguirre MD , No ref. provider found     REASON FOR VISIT:   Chief Complaint   Patient presents with    Sleep Apnea     CFU    Fatigue       DATE OF VISIT: 5/7/2025    HISTORY OF PRESENT ILLNESS: 59 y.o. year old female comes into the pulmonary office for a follow-up.  Underwent a repeat home sleep testing which showed that her sleep apnea severity has worsened.  Based on the results I started the patient on auto CPAP therapy.  She is using the therapy regularly and feels benefited from it.  Denies any mask leakage or pressure intolerance.  Still waking up tired and sleepy.  Feels sleepy during the day.  Weight has been stable.      Previously:  Was diagnosed to have mild obstructive sleep apnea in 2015 and was started on CPAP therapy.  She has used it for a few years and then stopped using it after she suffered with pneumonia.  For last several years she has not used it.  About 4 to 6 months ago she started using it back.  While using the machine she has felt that she sleeps much better.  Her snoring is less and she is more refreshed in the morning.  She is a poor sleeper with disturbed sleep throughout the night with or without the machine.  Constantly worries about things.  Weight is overall stable.  Machine has become very old.  She buys the mask on the Internet.        5/4/2025    10:19 AM 1/21/2025     4:37 PM 12/11/2024     3:20 PM   Sleep Medicine   Sitting and reading 1 1 1   Watching TV 0 1 1   Sitting, inactive in a public place (e.g. a theatre or a meeting) 1 0 0   As a passenger in a car for an hour without a break 2 2 2   Lying down to rest in the afternoon when circumstances permit 3 3 3   Sitting and talking to someone 0 0 0   Sitting quietly after a lunch without alcohol 1 1 1   In a car, while stopped for a few minutes in traffic 0 0 0   [place] Sleepiness Score 8  8  8        Patient-reported       REVIEW OF SYSTEMS:

## 2025-05-08 NOTE — PROGRESS NOTES
MG per tablet Take 1 tablet by mouth daily   Yes Jayla Kingston MD   sertraline (ZOLOFT) 100 MG tablet Take 1 tablet by mouth every morning   Yes Jayla Kingston MD   meclizine (ANTIVERT) 25 MG tablet Take 1 tablet by mouth 2 times daily as needed for Dizziness   Yes Jayla Kingston MD   omeprazole (PRILOSEC) 20 MG capsule Take 1 capsule by mouth 2 times daily   Yes ProviderJayla MD     Social History:   reports that she quit smoking about 21 years ago. Her smoking use included cigarettes. She started smoking about 24 years ago. She has a 0.8 pack-year smoking history. She has been exposed to tobacco smoke. She has never used smokeless tobacco. She reports that she does not drink alcohol and does not use drugs.   Family History:Reviewed. Denies family history of sudden cardiac death, arrhythmia, premature CAD  family history includes Alcohol Abuse in some other family members; Anemia in her father; Arrhythmia in her paternal uncle; Asthma in her paternal aunt; Breast Cancer in her maternal aunt; Cancer in her father and maternal aunt; Dementia in her maternal aunt; Depression in her maternal aunt and mother; Diabetes in her maternal aunt and another family member; Drug Abuse in an other family member; Fainting in her mother; Heart Failure in her maternal uncle; High Cholesterol in her maternal aunt, mother, and other family members; Hypertension in her father, maternal aunt, and other family members; Migraines in her mother and another family member; Ovarian Cancer in her mother; Pacemaker in her paternal uncle and another family member; Stroke in her maternal aunt, mother, and other family members.     Review of System:  Full ROS obtained and negative except as mentioned in HPI  Physical Examination:  /72 (BP Site: Right Upper Arm, Patient Position: Sitting, BP Cuff Size: Large Adult)   Pulse 68   Ht 1.651 m (5' 5\")   Wt 115.3 kg (254 lb 3.2 oz)   SpO2 97%   BMI 42.30 kg/m²

## 2025-05-12 ENCOUNTER — OFFICE VISIT (OUTPATIENT)
Dept: CARDIOLOGY CLINIC | Age: 60
End: 2025-05-12
Payer: COMMERCIAL

## 2025-05-12 ENCOUNTER — CLINICAL SUPPORT (OUTPATIENT)
Dept: CARDIOLOGY CLINIC | Age: 60
End: 2025-05-12

## 2025-05-12 VITALS
WEIGHT: 254.2 LBS | BODY MASS INDEX: 42.35 KG/M2 | SYSTOLIC BLOOD PRESSURE: 126 MMHG | DIASTOLIC BLOOD PRESSURE: 72 MMHG | HEART RATE: 68 BPM | HEIGHT: 65 IN | OXYGEN SATURATION: 97 %

## 2025-05-12 DIAGNOSIS — I44.30 AV BLOCK: ICD-10-CM

## 2025-05-12 DIAGNOSIS — Z95.0 PACEMAKER: Primary | ICD-10-CM

## 2025-05-12 DIAGNOSIS — R07.9 CHEST PAIN, UNSPECIFIED TYPE: ICD-10-CM

## 2025-05-12 DIAGNOSIS — Z95.0 PACEMAKER: ICD-10-CM

## 2025-05-12 DIAGNOSIS — I10 ESSENTIAL HYPERTENSION: ICD-10-CM

## 2025-05-12 DIAGNOSIS — I44.30 AV BLOCK: Primary | ICD-10-CM

## 2025-05-12 PROCEDURE — 99214 OFFICE O/P EST MOD 30 MIN: CPT

## 2025-05-12 PROCEDURE — 93000 ELECTROCARDIOGRAM COMPLETE: CPT

## 2025-05-12 PROCEDURE — 3078F DIAST BP <80 MM HG: CPT

## 2025-05-12 PROCEDURE — 3074F SYST BP LT 130 MM HG: CPT

## 2025-05-12 PROCEDURE — G2211 COMPLEX E/M VISIT ADD ON: HCPCS

## 2025-05-12 RX ORDER — METOPROLOL SUCCINATE 25 MG/1
25 TABLET, EXTENDED RELEASE ORAL DAILY
COMMUNITY
End: 2025-05-13 | Stop reason: SDUPTHER

## 2025-05-13 RX ORDER — METOPROLOL SUCCINATE 25 MG/1
25 TABLET, EXTENDED RELEASE ORAL DAILY
Qty: 90 TABLET | Refills: 0 | Status: SHIPPED | OUTPATIENT
Start: 2025-05-13

## 2025-05-13 NOTE — TELEPHONE ENCOUNTER
Requested Prescriptions     Pending Prescriptions Disp Refills    metoprolol succinate (TOPROL XL) 25 MG extended release tablet 90 tablet 0     Sig: Take 1 tablet by mouth daily      LAST OV: 02/13/2025   NEXT OV: 9/2/2025     Historical Provider

## 2025-05-13 NOTE — TELEPHONE ENCOUNTER
Medication Refill    Medication needing refilled:    metoprolol succinate (TOPROL XL) 25 MG extended release tablet [[id number]]     Dosage of the medication:    How are you taking this medication (QD, BID, TID, QID, PRN):  Sig: Take 1 tablet by mouth daily     30 or 90 day supply called in:  90    When will you run out of your medication:  Patient states she will be out of medication tomorrow  Which Pharmacy are we sending the medication to?:      [facility]/pharmacy #[id number] - [place], [place] [address] [phone] - F [phone]

## 2025-05-16 PROCEDURE — 93294 REM INTERROG EVL PM/LDLS PM: CPT | Performed by: INTERNAL MEDICINE

## 2025-05-16 PROCEDURE — 93296 REM INTERROG EVL PM/IDS: CPT | Performed by: INTERNAL MEDICINE

## 2025-06-05 ENCOUNTER — TELEPHONE (OUTPATIENT)
Dept: PULMONOLOGY | Age: 60
End: 2025-06-05

## 2025-06-05 NOTE — TELEPHONE ENCOUNTER
LM ON VM for the patient to call back regarding the overnight oximetry results. The patient does not need oxygen bled in therapy

## 2025-06-10 ENCOUNTER — TELEPHONE (OUTPATIENT)
Dept: ENT CLINIC | Age: 60
End: 2025-06-10

## 2025-06-25 ENCOUNTER — TELEPHONE (OUTPATIENT)
Dept: CARDIOLOGY CLINIC | Age: 60
End: 2025-06-25

## 2025-06-25 NOTE — TELEPHONE ENCOUNTER
I spoke with pt and told her that it may be a clot , but we have no way to assess if she is out of state. I advised if she has concerns  to go to the ER. They will assess and see if they need to do a doppler. I talked to [facility] and we agreed that concerns should be addressed by ER if out of state.

## 2025-06-25 NOTE — TELEPHONE ENCOUNTER
Pt called wanting to know if the could possible have a blood clot in their leg.    Pt is on way back from [place] and, feet are swollen and a  little discolored, they is pain rt thigh and calf, pt has not noticed a temp difference hot or cold/    Pls advise

## 2025-07-03 ENCOUNTER — PROCEDURE VISIT (OUTPATIENT)
Dept: AUDIOLOGY | Age: 60
End: 2025-07-03
Payer: COMMERCIAL

## 2025-07-03 ENCOUNTER — PROCEDURE VISIT (OUTPATIENT)
Dept: AUDIOLOGY | Age: 60
End: 2025-07-03

## 2025-07-03 ENCOUNTER — OFFICE VISIT (OUTPATIENT)
Dept: ENT CLINIC | Age: 60
End: 2025-07-03
Payer: COMMERCIAL

## 2025-07-03 VITALS
SYSTOLIC BLOOD PRESSURE: 127 MMHG | DIASTOLIC BLOOD PRESSURE: 84 MMHG | TEMPERATURE: 97.1 F | BODY MASS INDEX: 43.15 KG/M2 | HEART RATE: 87 BPM | OXYGEN SATURATION: 96 % | HEIGHT: 65 IN | WEIGHT: 259 LBS

## 2025-07-03 DIAGNOSIS — H90.A21 SENSORINEURAL HEARING LOSS (SNHL) OF RIGHT EAR WITH RESTRICTED HEARING OF LEFT EAR: ICD-10-CM

## 2025-07-03 DIAGNOSIS — H93.A2 PULSATILE TINNITUS, LEFT EAR: ICD-10-CM

## 2025-07-03 DIAGNOSIS — H93.11 TINNITUS OF RIGHT EAR: ICD-10-CM

## 2025-07-03 DIAGNOSIS — H90.3 SENSORINEURAL HEARING LOSS, BILATERAL: Primary | ICD-10-CM

## 2025-07-03 DIAGNOSIS — H81.01 MENIERE'S DISEASE (COCHLEAR HYDROPS), RIGHT: Primary | ICD-10-CM

## 2025-07-03 DIAGNOSIS — J30.9 ALLERGIC RHINITIS, UNSPECIFIED SEASONALITY, UNSPECIFIED TRIGGER: ICD-10-CM

## 2025-07-03 DIAGNOSIS — H81.12 BENIGN PAROXYSMAL POSITIONAL VERTIGO OF LEFT EAR: ICD-10-CM

## 2025-07-03 PROCEDURE — 99214 OFFICE O/P EST MOD 30 MIN: CPT | Performed by: STUDENT IN AN ORGANIZED HEALTH CARE EDUCATION/TRAINING PROGRAM

## 2025-07-03 PROCEDURE — 3079F DIAST BP 80-89 MM HG: CPT | Performed by: STUDENT IN AN ORGANIZED HEALTH CARE EDUCATION/TRAINING PROGRAM

## 2025-07-03 PROCEDURE — 3074F SYST BP LT 130 MM HG: CPT | Performed by: STUDENT IN AN ORGANIZED HEALTH CARE EDUCATION/TRAINING PROGRAM

## 2025-07-03 PROCEDURE — 92557 COMPREHENSIVE HEARING TEST: CPT

## 2025-07-03 PROCEDURE — 92567 TYMPANOMETRY: CPT

## 2025-07-03 RX ORDER — LORATADINE 10 MG/1
10 TABLET ORAL DAILY
Qty: 30 TABLET | Refills: 2 | Status: SHIPPED | OUTPATIENT
Start: 2025-07-03

## 2025-07-03 RX ORDER — TRIAMTERENE AND HYDROCHLOROTHIAZIDE 37.5; 25 MG/1; MG/1
1 TABLET ORAL DAILY
Qty: 90 TABLET | Refills: 2 | Status: SHIPPED | OUTPATIENT
Start: 2025-07-03

## 2025-07-03 NOTE — PROGRESS NOTES
Maddi Sanford   1965, 59 y.o. female   [id number]       Referring Provider: Tanmay Feliz DO  Referral Type: In an order in Epic    Reason for Visit: Evaluation of suspected change in hearing, tinnitus, or balance.    ADULT AUDIOLOGIC EVALUATION      Maddi Sanford is a 59 y.o. female seen today, 7/3/2025 , for a recheck audiologic evaluation.  Patient was seen by Tanmay Feliz DO following today's evaluation.    AUDIOLOGIC AND OTHER PERTINENT MEDICAL HISTORY:      Maddi Sanford reports left ear hearing has decreased since her last audiogram in 2024. She endorses right-sided tinnitus that has been constant. She has since been fit with hearing aids and is scheduled for a hearing aid check appointment after today's testing. She notes having left pulsatile tinnitus that is newer to her, but says she had a pacemaker recently put in. Pulsatile tinnitus started post op. Patient still endorses tinnitus with movement due to Meniere's.    She denied otalgia, aural fullness, and otorrhea    Date: 7/3/2025     Previous cause history from audiologic evaluation on 7/2/24:  Maddi Sanford reports a long-standing history of hearing loss, right worse than left. She noted that she was previous diagnosed with Meniere's Disease in her right ear for which she had a shunt placed. Patient arrived wearing hearing aids that are 8-10 years old. She noted that she struggles to hear in both ears with the hearing aids, so she believes her hearing is worsening. Patient reported a constant tinnitus in the right ear that is bothersome. She reported that her last vertigo attack was about a year a go. Patient has a family history of age-related hearing loss.      She denied otalgia, aural fullness, otorrhea, history of noise exposure, history of head trauma, and history of ear surgery  IMPRESSIONS:      Today's results revealed asymmetric sensorineural hearing loss, right ear worse than the left. Thresholds in the left ear show

## 2025-07-03 NOTE — PROGRESS NOTES
[facility]  DIVISION OF OTOLARYNGOLOGY- HEAD & NECK SURGERY  CLINIC FOLLOW-UP VISIT      Patient Name: Maddi Sanford  Medical Record Number:  [id number]  Primary Care Physician:  Alma Delia Izaguirre MD    ChiefComplaint     Chief Complaint   Patient presents with    Follow-up     F/u meniere's        History of Present Illness     Maddi Sanford is an 59 y.o. female previously seen for right Ménière's disease, right sensorineural hearing loss, right tinnitus. Last seen 1/9/25.     Interval History:   Ménière's is well-controlled recently.  The only time she gets vertigo is when she turns over from right to left in bed.  This will only last a couple seconds and she can typically stop it if she holds her head still.  This has been going on for couple months.  This has been going on on and off for longer.  This is minimally bothersome to her.  She is not interested in going to physical therapy for this as the last time she went to physical therapy she fell after becoming very vertiginous.  No recent head trauma.  Continues to have right tinnitus, unchanged.  She has developed some left pulsatile tinnitus after recent pacemaker implant.  Did not have pulsatile tinnitus prior to pacemaker implantation.  She finds her self sneezing more.  Does not take any allergy medications.      Past Medical History     Past Medical History:   Diagnosis Date    Arthritis     Asthma     resolved, no meds    Cerebral artery occlusion with cerebral infarction (HCC) TIAs    Depression     Edema of right lower leg     Fibromyalgia     Ganglion cyst     GERD (gastroesophageal reflux disease)     H/O drug overdose 2002    suicide attempt, cardiac arrest    Headache(784.0)     Hyperlipemia     Hypertension     IBS (irritable bowel syndrome)     Meniere's disease     MI (myocardial infarction) (HCC) ?    Migraines     Palpitations     PONV (postoperative nausea and vomiting)     Prinzmetal angina     Sleep apnea     cpap, does not use

## 2025-07-03 NOTE — PROGRESS NOTES
time.    PATIENT EDUCATION:     - Verbally and visually reviewed importance of consistent use and care and maintenance of devices.      Information was verbally shared with patient during appointment.        RECOMMENDATIONS:     Continue consistent hearing aid use  Return for hearing aid checks as needed. Patient is scheduled to return in 6 months for HAC 3 of 3  Retest hearing as medically indicated and/or sooner if a change in hearing is noted.  Contact audiologist with questions/concerns as needed    **No charge for today's appointment.    Harish Beckett  Audiologist    Portions of this note were dictated using Dragon. There may be linguistic errors secondary to the use of this program.

## 2025-07-28 RX ORDER — LORATADINE 10 MG/1
10 TABLET ORAL DAILY
Qty: 90 TABLET | Refills: 1 | OUTPATIENT
Start: 2025-07-28

## 2025-08-03 ENCOUNTER — PATIENT MESSAGE (OUTPATIENT)
Dept: CARDIOLOGY CLINIC | Age: 60
End: 2025-08-03

## 2025-08-04 RX ORDER — AMLODIPINE BESYLATE 10 MG/1
10 TABLET ORAL DAILY
Qty: 90 TABLET | Refills: 3 | Status: SHIPPED | OUTPATIENT
Start: 2025-08-04

## 2025-08-15 ENCOUNTER — TELEPHONE (OUTPATIENT)
Dept: CARDIOLOGY CLINIC | Age: 60
End: 2025-08-15

## 2025-08-15 PROCEDURE — 93294 REM INTERROG EVL PM/LDLS PM: CPT | Performed by: INTERNAL MEDICINE

## 2025-08-15 PROCEDURE — 93296 REM INTERROG EVL PM/IDS: CPT | Performed by: INTERNAL MEDICINE

## 2025-08-19 RX ORDER — METOPROLOL SUCCINATE 25 MG/1
25 TABLET, EXTENDED RELEASE ORAL DAILY
Qty: 90 TABLET | Refills: 0 | Status: SHIPPED | OUTPATIENT
Start: 2025-08-19

## 2025-08-20 RX ORDER — METOPROLOL SUCCINATE 25 MG/1
25 TABLET, EXTENDED RELEASE ORAL DAILY
Qty: 90 TABLET | Refills: 0 | OUTPATIENT
Start: 2025-08-20

## (undated) DEVICE — PADDING CAST W4INXL4YD NONSTERILE COT RAYON MICROPLEATED

## (undated) DEVICE — PENCIL ES L3M BTTN SWCH S STL HEX LOK BLDE ELECTRD HOLSTER

## (undated) DEVICE — SUTURE VCRL + SZ 0 L27IN ABSRB UD CT-1 L36MM 1/2 CIR TAPR VCP260H

## (undated) DEVICE — SET ADMIN PRIMING 7ML L30IN 7.35LB 20 GTT 2ND RLER CLMP

## (undated) DEVICE — GLOVE ORANGE PI 7 1/2   MSG9075

## (undated) DEVICE — STRIP,CLOSURE,WOUND,MEDI-STRIP,1/2X4: Brand: MEDLINE

## (undated) DEVICE — ELECTRODE PT RET AD L9FT HI MOIST COND ADH HYDRGEL CORDED

## (undated) DEVICE — LIGHT HANDLE: Brand: DEVON

## (undated) DEVICE — MASC TURNOVER KIT: Brand: MEDLINE INDUSTRIES, INC.

## (undated) DEVICE — 5.5 MM ELITE ACROMIOBLASTER                                    STRAIGHT DISPOSABLE BURRS, BRICK                                    RED, 10000 MAXIMUM RPM, PACKAGED 6                                    PER BOX, STERILE

## (undated) DEVICE — TUBING, SUCTION, 1/4" X 10', STRAIGHT: Brand: MEDLINE

## (undated) DEVICE — GLOVE SURG SZ 75 L12IN FNGR THK79MIL GRN LTX FREE

## (undated) DEVICE — 3M™ STERI-DRAPE™ U-DRAPE 1015: Brand: STERI-DRAPE™

## (undated) DEVICE — 4.0 MM ELITE ABRADER STRAIGHT                                    DISPOSABLE BURRS, AQUA, 10000                                    MAXIMUM RPM, PACKAGED 6 PER BOX, STERILE

## (undated) DEVICE — MAT FLR W28XL40IN STD GRN 60% RECYCL MAT LO ABSRB SGL LAYR

## (undated) DEVICE — DYONICS 25 PATIENT TUBE SET MUST                                    BE USED WITH 7211007, 12 PER BOX

## (undated) DEVICE — NEEDLE SPNL L3.5IN PNK HUB S STL REG WALL FIT STYL W/ QNCKE

## (undated) DEVICE — Device: Brand: NOMOLINE™ LH ADULT NASAL CO2 CANNULA WITH O2 4M

## (undated) DEVICE — MEDICINE CUP, GRADUATED, STER: Brand: MEDLINE

## (undated) DEVICE — MASTISOL ADHESIVE LIQ 2/3ML

## (undated) DEVICE — SUTURE PROL SZ 0 L30IN NONABSORBABLE BLU L36MM CT-1 1/2 CIR 8424H

## (undated) DEVICE — LIQUIBAND RAPID ADHESIVE 36/CS 0.8ML: Brand: MEDLINE

## (undated) DEVICE — DRESSING HYDROFIBER AQUACEL AG ADVANTAGE 3.5X6 IN

## (undated) DEVICE — BOWL MED L 32OZ PLAS W/ MOLD GRAD EZ OPN PEEL PCH

## (undated) DEVICE — SHOULDER STABILIZATION KIT,                                    DISPOSABLE 12 PER BOX

## (undated) DEVICE — SET EXTN L41IN 2 NDL FREE VALVES FREE INJ PRT

## (undated) DEVICE — ZIMMER® STERILE DISPOSABLE TOURNIQUET CUFF WITH PLC, DUAL PORT, SINGLE BLADDER, 34 IN. (86 CM)

## (undated) DEVICE — GOWN SIRUS NONREIN XL W/TWL: Brand: MEDLINE INDUSTRIES, INC.

## (undated) DEVICE — 3M™ COBAN™ SELF-ADHERENT WRAP, 1583S, STERILE, 3 IN X 5 YD (7,5 CM X 4,5 M), 24 ROLLS/CASE: Brand: 3M™ COBAN™

## (undated) DEVICE — PACK PROCEDURE SURG SHLDR MFFOP CUST

## (undated) DEVICE — CATHETER IV 20GA L1.25IN PNK FEP SFTY STR HUB RADPQ DISP

## (undated) DEVICE — TOWEL,OR,DSP,ST,BLUE,STD,4/PK,20PK/CS: Brand: MEDLINE

## (undated) DEVICE — SUTURE VCRL + SZ 2-0 L27IN ABSRB WHT SH 1/2 CIR TAPERCUT VCP417H

## (undated) DEVICE — GAUZE,SPONGE,4"X4",8PLY,STRL,LF,10/TRAY: Brand: MEDLINE

## (undated) DEVICE — PADDING UNDERCAST W4INXL4YD 100% COT CRIMPED FINISH WBRL II

## (undated) DEVICE — PAD ABSRB W8XL10IN ABD HYDROPHOBIC NONWOVEN THCK LAYR CELOS

## (undated) DEVICE — ESMARK: Brand: DEROYAL

## (undated) DEVICE — 3M™ TEGADERM™ TRANSPARENT FILM DRESSING FRAME STYLE, 1624W, 2-3/8 IN X 2-3/4 IN (6 CM X 7 CM), 100/CT 4CT/CASE: Brand: 3M™ TEGADERM™

## (undated) DEVICE — PADDING CAST W6INXL4YD COT LO LINTING WYTEX

## (undated) DEVICE — MASIMOSET LNCS ADTX SPO2 ADULT PULSE OXIMETER ADHESIVE SENSOR: Brand: MASIMOSET® LNCS® ADTX SPO2 ADULT PULSE OXIMETER ADHESIVE SENSOR

## (undated) DEVICE — GLOVE SURG SZ 8 CRM LTX FREE POLYISOPRENE POLYMER BEAD ANTI

## (undated) DEVICE — GLOVE ORANGE PI 8 1/2   MSG9085

## (undated) DEVICE — GLOVE SURG 9 PF CRM STRL SENSICARE PI MIC LF

## (undated) DEVICE — BANDAGE COMPR W6INXL12FT SMOOTH FOR LIMB EXSANG ESMARCH

## (undated) DEVICE — NEEDLE HYPO 23GA L1.5IN TURQ POLYPR HUB S STL THN WALL IM

## (undated) DEVICE — DEVON TUBE HOLDER REMOVABLE TOUCH FASTEN STRAP: Brand: DEVON

## (undated) DEVICE — SUTURE VCRL + SZ 2-0 L18IN ABSRB UD CT1 L36MM 1/2 CIR VCP839D

## (undated) DEVICE — SYRINGE MED 5ML STD CLR PLAS LUERLOCK TIP N CTRL DISP

## (undated) DEVICE — Z DISCONTINUED NO SUB IDED SET GRAV VENT NVENT CK VLV 3 NDL FREE PRT 10 GTT

## (undated) DEVICE — DRAPE,U/ SHT,SPLIT,PLAS,STERIL: Brand: MEDLINE

## (undated) DEVICE — PACK,SHOULDER,DRAPE,POUCH: Brand: MEDLINE

## (undated) DEVICE — T-MAX DISPOSABLE FACE MASK 8 PER BOX

## (undated) DEVICE — HYPODERMIC SAFETY NEEDLE: Brand: MAGELLAN

## (undated) DEVICE — GAUZE,SPONGE,4"X4",16PLY,XRAY,STRL,LF: Brand: MEDLINE

## (undated) DEVICE — BLADE ES ELASTOMERIC COAT INSUL DURABLE BEND UPTO 90DEG

## (undated) DEVICE — PACK PROCEDURE SURG EXTREMITY MFFOP CUST

## (undated) DEVICE — SLING ARM L ABV 13IN DE-ROTATION STRP HOOKS PROVIDE IMMOB

## (undated) DEVICE — CRADLE POS PRONE 24 X 5 X 3 IN ARM N COMPR NO CVR FOAM DISP

## (undated) DEVICE — Device

## (undated) DEVICE — WEREWOLF FLOW 90 COBLATION WAND: Brand: COBLATION

## (undated) DEVICE — DRESSING,GAUZE,XEROFORM,CURAD,5"X9",ST: Brand: CURAD

## (undated) DEVICE — APPLICATOR MEDICATED 26 CC SOLUTION HI LT ORNG CHLORAPREP

## (undated) DEVICE — SLITTER LD GUID ADJ DISP

## (undated) DEVICE — SPLINT ORTH W4XL30IN LAYERED FBRGLS FOAM PD BRTH BK MOLD

## (undated) DEVICE — GUIDEWIRE VASC ANGLED 035X150 M00146151B17

## (undated) DEVICE — GLOVE ORANGE PI 8   MSG9080

## (undated) DEVICE — SUTURE ETHBND EXCEL SZ 2-0 L18IN NONABSORBABLE GRN L22MM CX42D

## (undated) DEVICE — GLOVE,SURG,SENSICARE SLT,LF,PF,7.5: Brand: MEDLINE

## (undated) DEVICE — SUTURE MCRYL + SZ 4-0 L18IN ABSRB UD L19MM PS-2 3/8 CIR MCP496G

## (undated) DEVICE — BLANKET WRM W40.2XL55.9IN IORT LO BODY + MISTRAL AIR

## (undated) DEVICE — GLOVE SURG SZ 65 L12IN FNGR THK79MIL GRN LTX FREE

## (undated) DEVICE — BASIC SINGLE BASIN 1-LF: Brand: MEDLINE INDUSTRIES, INC.

## (undated) DEVICE — PROBE COVER KIT: Brand: MEDLINE INDUSTRIES, INC.

## (undated) DEVICE — PACEMAKER PACK: Brand: MEDLINE INDUSTRIES, INC.

## (undated) DEVICE — YANKAUER,BULB TIP,W/O VENT,RIGID,STERILE: Brand: MEDLINE

## (undated) DEVICE — SUTURE ETHLN SZ 3-0 L18IN NONABSORBABLE BLK PS-2 L19MM 3/8 1669H

## (undated) DEVICE — BANDAGE COMPR W6INXL10YD ST M E WHITE/BEIGE

## (undated) DEVICE — PACK EXTREMITY XR

## (undated) DEVICE — DRESSING,GAUZE,XEROFORM,CURAD,1"X8",ST: Brand: CURAD

## (undated) DEVICE — SYSTEM INTRO 7FR L295CM DIL L23CM LNG ORNG PEEL AWAY HEMSTAT

## (undated) DEVICE — KIT MICROINTRODUCER 4FR ECHOGENIC NDL L7CM 21GA STIFF COAX

## (undated) DEVICE — SHEET,DRAPE,53X77,STERILE: Brand: MEDLINE

## (undated) DEVICE — CANNULA THREADED FLEX 8.0 X 72MM: Brand: CLEAR-TRAC

## (undated) DEVICE — LOWER EXTREMITY: Brand: MEDLINE INDUSTRIES, INC.

## (undated) DEVICE — SUTURE FIBERWIRE SZ 5 L38IN NONABSORBABLE BLU L48MM 1/2 AR7211

## (undated) DEVICE — SOLUTION IV 1000ML LAC RINGERS PH 6.5 INJ USP VIAFLX PLAS

## (undated) DEVICE — ELECTRODE ELECSURG NDL 2.8 INX7.2 CM COAT INSUL EDGE

## (undated) DEVICE — CATHETER GUID 7FR L43CM ID1.8MM RIGHTSITE

## (undated) DEVICE — NEEDLE SUT FOR EXPRESSEW LL AND LLL SUT PASS EXPRESSEW LLL

## (undated) DEVICE — SUTURE VCRL + SZ 3-0 L18IN ABSRB UD SH 1/2 CIR TAPERCUT NDL VCP864D

## (undated) DEVICE — SPLINT OCL 2 PLSTR SPLNTING SYS 15 LAYR 6INX20FT

## (undated) DEVICE — GLOVE SURG SZ 65 THK91MIL LTX FREE SYN POLYISOPRENE

## (undated) DEVICE — RESTRAINT EXT ANK WRST LT BLU FOAM 2 STRP SIDE BCKL HK AND

## (undated) DEVICE — MEDIUM INTERVENTIONAL SPECIALTY SHIELD-YELLOW: Brand: RADPAD

## (undated) DEVICE — ZIMMER® STERILE DISPOSABLE TOURNIQUET CUFF WITH PLC, DUAL PORT, SINGLE BLADDER, 30 IN. (76 CM)

## (undated) DEVICE — INTENDED FOR TISSUE SEPARATION, AND OTHER PROCEDURES THAT REQUIRE A SHARP SURGICAL BLADE TO PUNCTURE OR CUT.: Brand: BARD-PARKER ® STAINLESS STEEL BLADES

## (undated) DEVICE — HEALICOIL REGENESORB 5.5 MM                                    THREADED DILATOR, DISPOSABLE
Type: IMPLANTABLE DEVICE | Site: SHOULDER | Status: NON-FUNCTIONAL
Brand: HEALICOIL
Removed: 2022-11-18

## (undated) DEVICE — SWITCH DRAPE TENET 7633

## (undated) DEVICE — GOWN,REINFORCED,POLY,SIRUS,XLNG/XXLG: Brand: MEDLINE

## (undated) DEVICE — SUTURE CHROMIC GUT SZ 4-0 L18IN ABSRB BRN L13MM P-3 3/8 CIR 1654G

## (undated) DEVICE — PENCIL SMK EVAC L10FT TBNG NONSTICK ESU BLDE PLUMEPEN ELITE

## (undated) DEVICE — SYRINGE MED 10ML TRNSLUC BRL PLUNG BLK MRK POLYPR CTRL

## (undated) DEVICE — SOLUTION IRRIG 1000ML 0.9% SOD CHL USP POUR PLAS BTL

## (undated) DEVICE — PADDING CAST W6INXL4YD NONSTERILE COT RAYON MICROPLEATED

## (undated) DEVICE — COVER LT HNDL UNIV FOR LNG HNDL KOVER 1 PER PK

## (undated) DEVICE — PAD, DEFIB, ADULT, RADIOTRANS, PHYSIO: Brand: MEDLINE

## (undated) DEVICE — SOLUTION IRRIG 3000ML 0.9% SOD CHL USP UROMATIC PLAS CONT

## (undated) DEVICE — INCISOR PLUS PLATINUM 4.5 MM BLADE: Brand: DYONICS INCISOR

## (undated) DEVICE — PENCIL SMK EVAC ALL IN 1 DSGN ENH VISIBILITY IMPROVED AIR

## (undated) DEVICE — INTRODUCER SHTH L13CM OD7FR SH ORNG HUB SEAMLESS SAFSHTH

## (undated) DEVICE — SKIN MARKER,REGULAR TIP WITH RULER: Brand: DEVON

## (undated) DEVICE — ADULT HEAD POSITIONER: Brand: DEVON

## (undated) DEVICE — SUTURE VCRL SZ 3-0 L18IN ABSRB UD L26MM SH 1/2 CIR J864D

## (undated) DEVICE — 3M™ IOBAN™ 2 ANTIMICROBIAL INCISE DRAPE 6650EZ: Brand: IOBAN™ 2

## (undated) DEVICE — SOLUTION IRRIG 500ML 0.9% SOD CHLO USP POUR PLAS BTL

## (undated) DEVICE — MERCY HEALTH WEST TURNOVER: Brand: MEDLINE INDUSTRIES, INC.

## (undated) DEVICE — PACK PROCEDURE SURG EXTREMITY SORGER CDS